# Patient Record
Sex: FEMALE | Race: WHITE | NOT HISPANIC OR LATINO | Employment: OTHER | URBAN - METROPOLITAN AREA
[De-identification: names, ages, dates, MRNs, and addresses within clinical notes are randomized per-mention and may not be internally consistent; named-entity substitution may affect disease eponyms.]

---

## 2017-01-12 ENCOUNTER — TELEPHONE (OUTPATIENT)
Dept: FAMILY MEDICINE | Facility: OTHER | Age: 57
End: 2017-01-12

## 2017-01-12 NOTE — TELEPHONE ENCOUNTER
Summary:    Patient is due/failing the following:   FIT, MAMMOGRAM and PAP    Action needed:   Patient needs office visit for PAP. and schedule a mammogram and complete a FIT test     Type of outreach:    Phone, left message for patient to call back.     Questions for provider review:    None                                                                                                                                    Jaquelin Wilruchi       Chart routed to Care Team .        Panel Management Review      Patient has the following on her problem list:     Depression / Dysthymia review  PHQ-9 SCORE 9/11/2015 12/11/2015 10/11/2016   Total Score - - -   Total Score MyChart - - -   Total Score 1 1 2      Patient is due for:  None    Hypertension   Last three blood pressure readings:  BP Readings from Last 3 Encounters:   10/11/16 124/80   02/05/16 147/85   12/11/15 130/84     Blood pressure: Passed    HTN Guidelines:  Age 18-59 BP range:  Less than 140/90  Age 60-85 with Diabetes:  Less than 140/90  Age 60-85 without Diabetes:  less than 150/90      Composite cancer screening  Chart review shows that this patient is due/due soon for the following Pap Smear, Mammogram and Fecal Colorectal (FIT)

## 2017-01-12 NOTE — Clinical Note
[facility]  [address]   [facility] [address]  Phone: [phone]  January 20, 2017      Micki Santos  [address] [place] [facility] [address]      Dear Micki,    We care about your health and have reviewed your health plan including your medical conditions, medications, and lab results.  Based on this review, it is recommended that you follow up regarding the following health topic(s):  -Breast Cancer Screening  -Colon Cancer Screening  -Cervical Cancer Screening    We recommend you take the following action(s):  -schedule a MAMMOGRAM which is due. Please disregard this reminder if you have had this exam elsewhere within the last 1-2 years please let us know so we can update your records.  -schedule a COLONOSCOPY to look for colon cancer (due every 10 years or 5 years in higher risk situations.)  Colonoscopies can prevent 90-95% of colon cancer deaths.  Problem lesions can be removed before they ever become cancer.  If you do not wish to do a colonoscopy or cannot afford to do one at this time, there is another option called a Fecal Immunochemical Occult Blood Test (FIT) a take home stool sample kit.  It does not replace the colonoscopy for colorectal cancer screening, but it can detect hidden bleeding in the lower colon.  It does need to be repeated every year and if a positive result is obtained, you would be referred for a colonoscopy.  If you have completed either one of these tests at another facility, please have the records sent to our clinic for our records.  -schedule a PAP SMEAR EXAM which is due.  Please disregard this reminder if you have had this exam elsewhere within the last year.  It would be helpful for us to have a copy of your recent pap smear report to update your records.     Please call us at the [facility] - [phone] (or use [employer]) to address the above recommendations.     Thank you for trusting [facility] and we  appreciate the opportunity to serve you.  We look forward to supporting your healthcare needs in the future.    Healthy Regards,    Your Health Care Team  [facility]

## 2017-01-17 NOTE — TELEPHONE ENCOUNTER
Attempted to call pt, unable to leave msg. Pt is also due to recheck TSH with lab only appt. Madelin Grimm, CMA

## 2017-03-02 ENCOUNTER — E-VISIT (OUTPATIENT)
Dept: FAMILY MEDICINE | Facility: OTHER | Age: 57
End: 2017-03-02
Payer: COMMERCIAL

## 2017-03-02 DIAGNOSIS — R05.9 COUGH: Primary | ICD-10-CM

## 2017-03-02 PROCEDURE — 99444 ZZC PHYSICIAN ONLINE EVALUATION & MANAGEMENT SERVICE: CPT | Performed by: FAMILY MEDICINE

## 2017-03-03 RX ORDER — AZITHROMYCIN 250 MG/1
TABLET, FILM COATED ORAL
Qty: 6 TABLET | Refills: 0 | Status: SHIPPED | OUTPATIENT
Start: 2017-03-03 | End: 2017-05-19

## 2017-03-23 DIAGNOSIS — E03.9 HYPOTHYROIDISM, UNSPECIFIED TYPE: ICD-10-CM

## 2017-03-23 DIAGNOSIS — F33.0 MAJOR DEPRESSIVE DISORDER, RECURRENT EPISODE, MILD (H): ICD-10-CM

## 2017-03-23 NOTE — TELEPHONE ENCOUNTER
Levothyroxine 150 mcg     Last Written Prescription Date: 10/11/2016  Last Quantity: 60, # refills: 0  Last Office Visit with [facility] or [facility] prescribing provider: 10/11/2016        TSH   Date Value Ref Range Status   10/11/2016 4.80 (H) 0.40 - 4.00 mU/L Final       Sertraline 100 mg     Last Written Prescription Date: 02/24/2016  Last Fill Quantity: 135, # refills: 1  Last Office Visit with [facility] primary care provider:  10/11/2016        Last PHQ-9 score on record=   PHQ-9 SCORE 10/11/2016   Total Score MyChart -   Total Score 2

## 2017-03-24 NOTE — TELEPHONE ENCOUNTER
Routing refill request to provider for review/approval because:  Labs out of range:  TSH  A break in medication  Aleisha Guerra RN

## 2017-03-25 RX ORDER — LEVOTHYROXINE SODIUM 150 UG/1
150 TABLET ORAL DAILY
Qty: 30 TABLET | Refills: 0 | Status: SHIPPED | OUTPATIENT
Start: 2017-03-25 | End: 2017-06-06

## 2017-03-25 RX ORDER — SERTRALINE HYDROCHLORIDE 100 MG/1
TABLET, FILM COATED ORAL
Qty: 45 TABLET | Refills: 0 | Status: SHIPPED | OUTPATIENT
Start: 2017-03-25 | End: 2017-06-06

## 2017-04-28 ENCOUNTER — E-VISIT (OUTPATIENT)
Dept: FAMILY MEDICINE | Facility: OTHER | Age: 57
End: 2017-04-28
Payer: COMMERCIAL

## 2017-04-28 DIAGNOSIS — I10 ESSENTIAL HYPERTENSION WITH GOAL BLOOD PRESSURE LESS THAN 140/90: ICD-10-CM

## 2017-04-28 DIAGNOSIS — J01.00 ACUTE MAXILLARY SINUSITIS, RECURRENCE NOT SPECIFIED: Primary | ICD-10-CM

## 2017-04-28 PROCEDURE — 99444 ZZC PHYSICIAN ONLINE EVALUATION & MANAGEMENT SERVICE: CPT | Performed by: FAMILY MEDICINE

## 2017-04-28 NOTE — TELEPHONE ENCOUNTER
atenolol      Last Written Prescription Date: 07/08/16  Last Fill Quantity: 90, # refills: 1    Last Office Visit with [facility], [facility] or [facility] prescribing provider:  E- visit today 4/28/17   Future Office Visit:        BP Readings from Last 3 Encounters:   10/11/16 124/80   02/05/16 147/85   12/11/15 130/84

## 2017-05-01 NOTE — TELEPHONE ENCOUNTER
Routing refill request to provider for review/approval because:  Appears to be a break in medication - should have been out in January    Neha Guardado, RN, BSN

## 2017-05-02 NOTE — TELEPHONE ENCOUNTER
Attempted to contact pt, unable to LM.   Please retry again later.   Mary Lou Quiñonez MA  May 2, 2017

## 2017-05-05 RX ORDER — ATENOLOL 50 MG/1
TABLET ORAL
Qty: 90 TABLET | Refills: 1 | Status: SHIPPED | OUTPATIENT
Start: 2017-05-05 | End: 2018-01-04

## 2017-05-12 NOTE — PROGRESS NOTES
SUBJECTIVE:                                                    Micki Santos is a 56 year old female who presents to clinic today for the following health issues:      History of Present Illness   Depression & Anxiety Follow-up:     Depression/Anxiety:  Depression only    Status since last visit::  Worsened    Other associated symptoms of depression::  None    Significant life event::  No    Current substance use::  None    Anxiety/Panic symptoms::  No      GERD/Heartburn     Onset: 3 weeks--worse with the increase in ibuprofen use, has been off proton pump inhibitor for 6 months    Description:     Burning in chest: YES    Intensity: moderate    Progression of Symptoms: worsening    Accompanying Signs & Symptoms:  Does it feel like food gets stuck: no  Nausea: YES  Vomiting (bloody?): no  Abdominal Pain: YES  Black-Tarry stools: no:  Bloody stools: no   History:   Previous ulcers: no    Precipitating factors:   Caffeine use: YES  Alcohol use: YES  NSAID/Aspirin use: YES  Tobacco use: no  Worse with no particular food or drink.    Alleviating factors:  Elevated head when sleeping         Therapies Tried and outcome:none    Joint Pain     Onset: 5 weeks, worse, more prolonged, more intense, more frequent.  Has some cramps, wakes her up in the middle of the night.  Hands and knees are bad.  Using ibuprofen more.  Saw Rheumatology 2 years ago.    Description:   Location: left knee and right knee, hands  Character: Dull ache, Burning and Cramping    Intensity: moderate    Progression of Symptoms: worse    Accompanying Signs & Symptoms:  Other symptoms: none   History:   Previous similar pain: YES      Precipitating factors:   Trauma or overuse: no     Alleviating factors:  Improved by: NSAID - ibuprofen       Therapies Tried and outcome: ibuprofen      Problem list and histories reviewed & adjusted, as indicated.  Additional history: as documented    Patient Active Problem List   Diagnosis     Esophageal reflux  "    Mild major depression (H)     Pain in thoracic spine     Hypertension, goal below 140/90     Family history of skin cancer     Multiple nevi     Hypothyroidism     Morbid obesity, unspecified obesity type (H)     Past Surgical History:   Procedure Laterality Date     C REMOVAL OF OVARY(S)      left ovary removed-cyst, uterine fibroid removed     CHOLECYSTECTOMY, LAPOROSCOPIC  06    Cholecystectomy, Laparoscopic     ESOPHAGOSCOPY, GASTROSCOPY, DUODENOSCOPY (EGD), COMBINED N/A 2016    Procedure: COMBINED ESOPHAGOSCOPY, GASTROSCOPY, DUODENOSCOPY (EGD), BIOPSY SINGLE OR MULTIPLE;  Surgeon: Del Betts MD;  Location:  GI     GENITOURINARY SURGERY      removal of left ovary with cyst and uterine fibroid     HC REMOVAL OF TONSILS,12+ Y/O      Tonsils 11+y.o.     HC UGI ENDOSCOPY, SIMPLE EXAM  09       Social History   Substance Use Topics     Smoking status: Never Smoker     Smokeless tobacco: Never Used      Comment: No smokers in home     Alcohol use Yes      Comment: 1-2 glasses of wine/week     Family History   Problem Relation Age of Onset     C.A.D. Maternal Grandmother      C.A.D. Maternal Grandfather      C.A.D. Paternal Grandfather       age 38     Respiratory Mother      sarcoidosis     Hypertension Mother      chronic     Depression Mother      10+ years     DIABETES Father      Type II diabetes     Coronary Artery Disease Father      Heart Attack with Angioplasty/Stent     Arthritis No family hx of            ROS:  C: NEGATIVE for fever, chills, change in weight  E/M: NEGATIVE for ear, mouth and throat problems  R: NEGATIVE for significant cough or SOB  CV: NEGATIVE for chest pain, palpitations or peripheral edema    OBJECTIVE:                                                    /82  Pulse 80  Temp 98.4  F (36.9  C) (Temporal)  Resp 16  Ht 5' 3.47\" (1.612 m)  Wt 240 lb (108.9 kg)  LMP 2003  BMI 41.89 kg/m2  Body mass index is 41.89 kg/(m^2).  Gen: no " "apparent distress  NECK: no adenopathy, no asymmetry, no masses  Chest: clear to auscultation without wheeze, rale or rhonchi  Cor: regular rate and rhythm without murmur  ABDOMEN: soft, nontender, no masses and bowel sounds normal  Ext: warm and dry without edema  MSK:  No joint swelling or redness, full range of motion of all joints  Psych: Alert and oriented times 3; coherent speech, normal   rate and volume, able to articulate logical thoughts, able   to abstract reason, no tangential thoughts, no hallucinations   or delusions  Her affect is neutral        ASSESSMENT/PLAN:                                                      BMI:   Estimated body mass index is 41.89 kg/(m^2) as calculated from the following:    Height as of this encounter: 5' 3.47\" (1.612 m).    Weight as of this encounter: 240 lb (108.9 kg).   Weight management plan: Discussed healthy diet and exercise guidelines and patient will follow up in 12 months in clinic to re-evaluate.      1. Multiple joint pain  Redraw labs, has been steroid responsive in the past, will try course of prednisone.  Consider returning to Rheumatology as they had consider starting Plaquenil for her.    - Lyme Disease Jennifer with reflex to WB Serum  - Cyclic Citrullinated Peptide Antibody IgG  - Rheumatoid factor  - Antinuclear antibody screen by EIA  - ESR: Erythrocyte sedimentation rate  - CRP inflammation  - predniSONE (DELTASONE) 5 MG tablet; Take 1 tab (5mg) twice daily for 3 weeks (total 10mg day), then 1 tab (5mg) daily for 2 week, then 1/2 tablet day for 2 week then stop.  Dispense: 71 tablet; Refill: 0    2. Gastroesophageal reflux disease without esophagitis  Restart proton pump inhibitor as chronic NSAIDs has exacerbated it.  States that Nexium caused her to feel ill.    - pantoprazole (PROTONIX) 40 MG EC tablet; Take 1 tablet (40 mg) by mouth daily  Dispense: 90 tablet; Refill: 3    3. Hypertension, goal below 140/90  Not well controlled, she feels it is better " controlled than my reading here, feels her pain has exacerbated her BP.  So hoping that prednisone use will improve her BP, she will get it checked again at a [place] pharmacy in 1-2 weeks.    4. Morbid obesity, unspecified obesity type (H)  Encouraged diet and exercise.    5. Major depressive disorder, recurrent episode, mild (H)  Feels mood is stable on Zoloft.    6. Screen for colon cancer    - Fecal colorectal cancer screen FIT - Future (S+30); Future    Miranda Bosch MD  [facility]

## 2017-05-16 ASSESSMENT — ANXIETY QUESTIONNAIRES
GAD7 TOTAL SCORE: 5
7. FEELING AFRAID AS IF SOMETHING AWFUL MIGHT HAPPEN: 0 = NOT AT ALL
GAD7 TOTAL SCORE: 5

## 2017-05-16 ASSESSMENT — PATIENT HEALTH QUESTIONNAIRE - PHQ9
SUM OF ALL RESPONSES TO PHQ QUESTIONS 1-9: 3
10. IF YOU CHECKED OFF ANY PROBLEMS, HOW DIFFICULT HAVE THESE PROBLEMS MADE IT FOR YOU TO DO YOUR WORK, TAKE CARE OF THINGS AT HOME, OR GET ALONG WITH OTHER PEOPLE: SOMEWHAT DIFFICULT

## 2017-05-17 ASSESSMENT — PATIENT HEALTH QUESTIONNAIRE - PHQ9: SUM OF ALL RESPONSES TO PHQ QUESTIONS 1-9: 3

## 2017-05-17 ASSESSMENT — ANXIETY QUESTIONNAIRES: GAD7 TOTAL SCORE: 5

## 2017-05-19 ENCOUNTER — OFFICE VISIT (OUTPATIENT)
Dept: FAMILY MEDICINE | Facility: OTHER | Age: 57
End: 2017-05-19
Payer: COMMERCIAL

## 2017-05-19 VITALS
RESPIRATION RATE: 16 BRPM | WEIGHT: 240 LBS | HEIGHT: 63 IN | DIASTOLIC BLOOD PRESSURE: 82 MMHG | HEART RATE: 80 BPM | SYSTOLIC BLOOD PRESSURE: 166 MMHG | BODY MASS INDEX: 42.52 KG/M2 | TEMPERATURE: 98.4 F

## 2017-05-19 DIAGNOSIS — K21.9 GASTROESOPHAGEAL REFLUX DISEASE WITHOUT ESOPHAGITIS: ICD-10-CM

## 2017-05-19 DIAGNOSIS — F33.0 MAJOR DEPRESSIVE DISORDER, RECURRENT EPISODE, MILD (H): ICD-10-CM

## 2017-05-19 DIAGNOSIS — I10 HYPERTENSION, GOAL BELOW 140/90: ICD-10-CM

## 2017-05-19 DIAGNOSIS — E66.01 MORBID OBESITY, UNSPECIFIED OBESITY TYPE (H): ICD-10-CM

## 2017-05-19 DIAGNOSIS — Z12.11 SCREEN FOR COLON CANCER: ICD-10-CM

## 2017-05-19 DIAGNOSIS — M25.50 MULTIPLE JOINT PAIN: Primary | ICD-10-CM

## 2017-05-19 LAB — ERYTHROCYTE [SEDIMENTATION RATE] IN BLOOD BY WESTERGREN METHOD: 9 MM/H (ref 0–30)

## 2017-05-19 PROCEDURE — 86140 C-REACTIVE PROTEIN: CPT | Performed by: FAMILY MEDICINE

## 2017-05-19 PROCEDURE — 99214 OFFICE O/P EST MOD 30 MIN: CPT | Performed by: FAMILY MEDICINE

## 2017-05-19 PROCEDURE — 86200 CCP ANTIBODY: CPT | Performed by: FAMILY MEDICINE

## 2017-05-19 PROCEDURE — 85652 RBC SED RATE AUTOMATED: CPT | Performed by: FAMILY MEDICINE

## 2017-05-19 PROCEDURE — 86038 ANTINUCLEAR ANTIBODIES: CPT | Performed by: FAMILY MEDICINE

## 2017-05-19 PROCEDURE — 86618 LYME DISEASE ANTIBODY: CPT | Performed by: FAMILY MEDICINE

## 2017-05-19 PROCEDURE — 86431 RHEUMATOID FACTOR QUANT: CPT | Performed by: FAMILY MEDICINE

## 2017-05-19 PROCEDURE — 36415 COLL VENOUS BLD VENIPUNCTURE: CPT | Performed by: FAMILY MEDICINE

## 2017-05-19 RX ORDER — PANTOPRAZOLE SODIUM 40 MG/1
40 TABLET, DELAYED RELEASE ORAL DAILY
Qty: 90 TABLET | Refills: 3 | Status: SHIPPED | OUTPATIENT
Start: 2017-05-19 | End: 2018-06-14

## 2017-05-19 RX ORDER — PREDNISONE 5 MG/1
TABLET ORAL
Qty: 71 TABLET | Refills: 0 | Status: SHIPPED | OUTPATIENT
Start: 2017-05-19 | End: 2018-05-14

## 2017-05-19 NOTE — MR AVS SNAPSHOT
After Visit Summary   5/19/2017    Micki Santos    MRN: [id number]           Patient Information     Date Of Birth          1960        Visit Information        Provider Department      5/19/2017 1:40 PM Miranda Bosch MD [facility]        Today's Diagnoses     Multiple joint pain    -  1    Gastroesophageal reflux disease without esophagitis        Screen for colon cancer           Follow-ups after your visit        Future tests that were ordered for you today     Open Future Orders        Priority Expected Expires Ordered    Fecal colorectal cancer screen FIT - Future (S+30) Routine 6/2/2017 6/11/2017 5/19/2017            Who to contact     If you have questions or need follow up information about today's clinic visit or your schedule please contact [facility] directly at [phone].  Normal or non-critical lab and imaging results will be communicated to you by MyChart, letter or phone within 4 business days after the clinic has received the results. If you do not hear from us within 7 days, please contact the clinic through MyChart or phone. If you have a critical or abnormal lab result, we will notify you by phone as soon as possible.  Submit refill requests through [employer] or call your pharmacy and they will forward the refill request to us. Please allow 3 business days for your refill to be completed.          Additional Information About Your Visit        MyChart Information     [employer] gives you secure access to your electronic health record. If you see a primary care provider, you can also send messages to your care team and make appointments. If you have questions, please call your primary care clinic.  If you do not have a primary care provider, please call [phone] and they will assist you.        Care EveryWhere ID     This is your Care EveryWhere ID. This could be used by other organizations to access your [facility]  "records  [id number]        Your Vitals Were     Pulse Temperature Respirations Height Last Period BMI (Body Mass Index)    80 98.4  F (36.9  C) (Temporal) 16 5' 3.47\" (1.612 m) 07/29/2003 41.89 kg/m2       Blood Pressure from Last 3 Encounters:   05/19/17 166/82   10/11/16 124/80   02/05/16 147/85    Weight from Last 3 Encounters:   05/19/17 240 lb (108.9 kg)   10/11/16 238 lb (108 kg)   02/05/16 245 lb (111.1 kg)              We Performed the Following     Antinuclear antibody screen by EIA     CRP inflammation     Cyclic Citrullinated Peptide Antibody IgG     DEPRESSION ACTION PLAN (DAP)     ESR: Erythrocyte sedimentation rate     Lyme Disease Jennifer with reflex to WB Serum     Rheumatoid factor          Today's Medication Changes          These changes are accurate as of: 5/19/17  2:33 PM.  If you have any questions, ask your nurse or doctor.               Start taking these medicines.        Dose/Directions    pantoprazole 40 MG EC tablet   Commonly known as:  PROTONIX   Used for:  Gastroesophageal reflux disease without esophagitis   Started by:  Miranda Bosch MD        Dose:  40 mg   Take 1 tablet (40 mg) by mouth daily   Quantity:  90 tablet   Refills:  3       predniSONE 5 MG tablet   Commonly known as:  DELTASONE   Used for:  Multiple joint pain   Started by:  Miranda Bosch MD        Take 1 tab (5mg) twice daily for 3 weeks (total 10mg day), then 1 tab (5mg) daily for 2 week, then 1/2 tablet day for 2 week then stop.   Quantity:  71 tablet   Refills:  0            Where to get your medicines      These medications were sent to [facility] [id number] [facility]  [address] [place] [address]     Phone:  [phone]     pantoprazole 40 MG EC tablet    predniSONE 5 MG tablet                Primary Care Provider Office Phone # Fax #    Miranda Bosch [phone]       [address]  [facility] [address]      "   Thank you!     Thank you for choosing [facility]  for your care. Our goal is always to provide you with excellent care. Hearing back from our patients is one way we can continue to improve our services. Please take a few minutes to complete the written survey that you may receive in the mail after your visit with us. Thank you!             Your Updated Medication List - Protect others around you: Learn how to safely use, store and throw away your medicines at www.disposemymeds.org.          This list is accurate as of: 5/19/17  2:33 PM.  Always use your most recent med list.                   Brand Name Dispense Instructions for use    atenolol 50 MG tablet    TENORMIN    90 tablet    TAKE ONE TABLET BY MOUTH ONE TIME DAILY       levothyroxine 150 MCG tablet    SYNTHROID/LEVOTHROID    30 tablet    Take 1 tablet (150 mcg) by mouth daily She is overdue for repeat lab       lisinopril 20 MG tablet    PRINIVIL/ZESTRIL    180 tablet    TAKE 1 TABLET (20 MG) BY MOUTH 2 TIMES DAILY       mometasone 50 MCG/ACT spray    NASONEX    1 Box    Spray 2 sprays into both nostrils daily       pantoprazole 40 MG EC tablet    PROTONIX    90 tablet    Take 1 tablet (40 mg) by mouth daily       predniSONE 5 MG tablet    DELTASONE    71 tablet    Take 1 tab (5mg) twice daily for 3 weeks (total 10mg day), then 1 tab (5mg) daily for 2 week, then 1/2 tablet day for 2 week then stop.       sertraline 100 MG tablet    ZOLOFT    45 tablet    TAKE 1 & 1/2 TABLETS (150 MG) BY MOUTH ONCE DAILY

## 2017-05-19 NOTE — LETTER
My Depression Action Plan  Name: Micki Santos   Date of Birth 1960  Date: 5/12/2017    My doctor: Miranda Bosch   My clinic: [address]  [facility] [address] [phone]          GREEN    ZONE   Good Control    What it looks like:     Things are going generally well. You have normal up s and down s. You may even feel depressed from time to time, but bad moods usually last less than a day.   What you need to do:  1. Continue to care for yourself (see self care plan)  2. Check your depression survival kit and update it as needed  3. Follow your physician s recommendations including any medication.  4. Do not stop taking medication unless you consult with your physician first.           YELLOW         ZONE Getting Worse    What it looks like:     Depression is starting to interfere with your life.     It may be hard to get out of bed; you may be starting to isolate yourself from others.    Symptoms of depression are starting to last most all day and this has happened for several days.     You may have suicidal thoughts but they are not constant.   What you need to do:     1. Call your care team, your response to treatment will improve if you keep your care team informed of your progress. Yellow periods are signs an adjustment may need to be made.     2. Continue your self-care, even if you have to fake it!    3. Talk to someone in your support network    4. Open up your depression survival kit           RED    ZONE Medical Alert - Get Help    What it looks like:     Depression is seriously interfering with your life.     You may experience these or other symptoms: You can t get out of bed most days, can t work or engage in other necessary activities, you have trouble taking care of basic hygiene, or basic responsibilities, thoughts of suicide or death that will not go away, self-injurious behavior.     What you need to do:  1. Call your  care team and request a same-day appointment. If they are not available (weekends or after hours) call your local crisis line, emergency room or 911.      Electronically signed by: Mirta Jordan, May 12, 2017    Depression Self Care Plan / Survival Kit    Self-Care for Depression  Here s the deal. Your body and mind are really not as separate as most people think.  What you do and think affects how you feel and how you feel influences what you do and think. This means if you do things that people who feel good do, it will help you feel better.  Sometimes this is all it takes.  There is also a place for medication and therapy depending on how severe your depression is, so be sure to consult with your medical provider and/ or Behavioral Health Consultant if your symptoms are worsening or not improving.     In order to better manage my stress, I will:    Exercise  Get some form of exercise, every day. This will help reduce pain and release endorphins, the  feel good  chemicals in your brain. This is almost as good as taking antidepressants!  This is not the same as joining a gym and then never going! (they count on that by the way ) It can be as simple as just going for a walk or doing some gardening, anything that will get you moving.      Hygiene   Maintain good hygiene (Get out of bed in the morning, Make your bed, Brush your teeth, Take a shower, and Get dressed like you were going to work, even if you are unemployed).  If your clothes don't fit try to get ones that do.    Diet  I will strive to eat foods that are good for me, drink plenty of water, and avoid excessive sugar, caffeine, alcohol, and other mood-altering substances.  Some foods that are helpful in depression are: complex carbohydrates, B vitamins, flaxseed, fish or fish oil, fresh fruits and vegetables.    Psychotherapy  I agree to participate in Individual Therapy (if recommended).    Medication  If prescribed medications, I agree to take them.   Missing doses can result in serious side effects.  I understand that drinking alcohol, or other illicit drug use, may cause potential side effects.  I will not stop my medication abruptly without first discussing it with my provider.    Staying Connected With Others  I will stay in touch with my friends, family members, and my primary care provider/team.    Use your imagination  Be creative.  We all have a creative side; it doesn t matter if it s oil painting, sand castles, or mud pies! This will also kick up the endorphins.    Witness Beauty  (AKA stop and smell the roses) Take a look outside, even in mid-winter. Notice colors, textures. Watch the squirrels and birds.     Service to others  Be of service to others.  There is always someone else in need.  By helping others we can  get out of ourselves  and remember the really important things.  This also provides opportunities for practicing all the other parts of the program.    Humor  Laugh and be silly!  Adjust your TV habits for less news and crime-drama and more comedy.    Control your stress  Try breathing deep, massage therapy, biofeedback, and meditation. Find time to relax each day.     My support system    Clinic Contact:  Phone number:    Contact 1:  Phone number:    Contact 2:  Phone number:    Congregational/:  Phone number:    Therapist:  Phone number:    Local crisis center:    Phone number:    Other community support:  Phone number:

## 2017-05-19 NOTE — NURSING NOTE
"Chief Complaint   Patient presents with     Gastrophageal Reflux     Joint Pain     Health Maintenance     height, mammogram, fit yearly, depression action plan, phq9       Initial /82  Pulse 80  Temp 98.4  F (36.9  C) (Temporal)  Resp 16  Ht 5' 3.47\" (1.612 m)  Wt 240 lb (108.9 kg)  LMP 07/29/2003  BMI 41.89 kg/m2 Estimated body mass index is 41.89 kg/(m^2) as calculated from the following:    Height as of this encounter: 5' 3.47\" (1.612 m).    Weight as of this encounter: 240 lb (108.9 kg).  Medication Reconciliation: complete   Madelin Grimm CMA    "

## 2017-05-20 LAB — CRP SERPL-MCNC: 4.6 MG/L (ref 0–8)

## 2017-05-21 LAB
B BURGDOR IGG+IGM SER QL: 0.08 (ref 0–0.89)
RHEUMATOID FACT SER NEPH-ACNC: <20 IU/ML (ref 0–20)

## 2017-05-22 LAB
ANA SER QL IA: NORMAL
CCP AB SER IA-ACNC: 1 U/ML

## 2017-05-22 NOTE — PROGRESS NOTES
Dima Sweet,     My name is Rhonda Rousseau I am a family Nurse Practitioner helping to cover for Dr. Cavanaugh as she is out of the office. I wanted to let you know that your lab work from the office visit is good none were positive for inflammation and the Lyme was negative as well. I noticed you discussed re-visiting Rheumatology if you would like a referral for this please let me know and I will place referral in for you.    Thank you  Rhonda Rousseau CNP

## 2017-06-06 DIAGNOSIS — E03.9 HYPOTHYROIDISM, UNSPECIFIED TYPE: ICD-10-CM

## 2017-06-06 DIAGNOSIS — F33.0 MAJOR DEPRESSIVE DISORDER, RECURRENT EPISODE, MILD (H): ICD-10-CM

## 2017-06-07 NOTE — TELEPHONE ENCOUNTER
Levothyroxine     Last Written Prescription Date: 3/25/2017  Last Quantity: 30, # refills: 0  Last Office Visit with [facility], [facility] or [facility] prescribing provider: 5/19/201        TSH   Date Value Ref Range Status   10/11/2016 4.80 (H) 0.40 - 4.00 mU/L Final     Zoloft     Last Written Prescription Date: 3/25/2017  Last Fill Quantity: 45, # refills: 0  Last Office Visit with [facility] prim/ramona care provider:  5/19/2017        Last PHQ-9 score on record=   PHQ-9 SCORE 5/16/2017   Total Score Seanhart 3 (Minimal depression)   Total Score -         Raquel Vargas MA

## 2017-06-08 RX ORDER — SERTRALINE HYDROCHLORIDE 100 MG/1
TABLET, FILM COATED ORAL
Qty: 45 TABLET | Refills: 4 | Status: SHIPPED | OUTPATIENT
Start: 2017-06-08 | End: 2018-01-04

## 2017-06-08 RX ORDER — LEVOTHYROXINE SODIUM 150 UG/1
TABLET ORAL
Qty: 30 TABLET | Refills: 0 | Status: SHIPPED | OUTPATIENT
Start: 2017-06-08 | End: 2018-05-14

## 2017-06-08 NOTE — TELEPHONE ENCOUNTER
Zoloft:  Prescription approved per [facility] Refill Protocol.  Synthroid:  Routing refill request to provider for review/approval because:  Labs out of range:  TSH    Neha Guardado, RN, BSN

## 2017-06-13 ENCOUNTER — OFFICE VISIT (OUTPATIENT)
Dept: OTHER | Facility: CLINIC | Age: 57
End: 2017-06-13
Attending: SURGERY
Payer: COMMERCIAL

## 2017-06-13 ENCOUNTER — HOSPITAL ENCOUNTER (OUTPATIENT)
Dept: ULTRASOUND IMAGING | Facility: CLINIC | Age: 57
Discharge: HOME | End: 2017-06-13
Attending: SURGERY | Admitting: SURGERY
Payer: COMMERCIAL

## 2017-06-13 VITALS — SYSTOLIC BLOOD PRESSURE: 136 MMHG | HEART RATE: 47 BPM | DIASTOLIC BLOOD PRESSURE: 79 MMHG

## 2017-06-13 DIAGNOSIS — I73.9 CLAUDICATION (H): ICD-10-CM

## 2017-06-13 DIAGNOSIS — M79.604 PAIN IN BOTH LOWER EXTREMITIES: Primary | ICD-10-CM

## 2017-06-13 DIAGNOSIS — M79.605 PAIN IN BOTH LOWER EXTREMITIES: Primary | ICD-10-CM

## 2017-06-13 PROCEDURE — 93924 LWR XTR VASC STDY BILAT: CPT

## 2017-06-13 PROCEDURE — 99211 OFF/OP EST MAY X REQ PHY/QHP: CPT

## 2017-06-13 PROCEDURE — 99204 OFFICE O/P NEW MOD 45 MIN: CPT | Mod: ZP | Performed by: SURGERY

## 2017-06-13 NOTE — NURSING NOTE
"Chief Complaint   Patient presents with     RECHECK     GOGO w/ exercise (10:15 [facility]; 10:45 [facility]) New consult, patient self referred for claudication symptoms, records in Epic       Initial /79 (BP Location: Left arm, Patient Position: Chair, Cuff Size: Adult Large)  Pulse (!) 47  LMP 07/29/2003  Breastfeeding? No Estimated body mass index is 41.89 kg/(m^2) as calculated from the following:    Height as of 5/19/17: 5' 3.47\" (1.612 m).    Weight as of 5/19/17: 240 lb (108.9 kg).  Medication Reconciliation: complete     Face to face nursing time: 8 minutes    Kimmie Dotson MA     "

## 2017-06-13 NOTE — MR AVS SNAPSHOT
After Visit Summary   6/13/2017    Micki Santos    MRN: [id number]           Patient Information     Date Of Birth          1960        Visit Information        Provider Department      6/13/2017 10:45 AM Damien Merritt MD [facility]      Today's Diagnoses     Pain in both lower extremities    -  1       Follow-ups after your visit        Who to contact     If you have questions or need follow up information about today's clinic visit or your schedule please contact [facility] [place] directly at [phone].  Normal or non-critical lab and imaging results will be communicated to you by MyChart, letter or phone within 4 business days after the clinic has received the results. If you do not hear from us within 7 days, please contact the clinic through CEPA Safe Drivehart or phone. If you have a critical or abnormal lab result, we will notify you by phone as soon as possible.  Submit refill requests through WineNice or call your pharmacy and they will forward the refill request to us. Please allow 3 business days for your refill to be completed.          Additional Information About Your Visit        MyChart Information     WineNice gives you secure access to your electronic health record. If you see a primary care provider, you can also send messages to your care team and make appointments. If you have questions, please call your primary care clinic.  If you do not have a primary care provider, please call [phone] and they will assist you.        Care EveryWhere ID     This is your Care EveryWhere ID. This could be used by other organizations to access your [place] medical records  [id number]        Your Vitals Were     Pulse Last Period Breastfeeding?             47 07/29/2003 No          Blood Pressure from Last 3 Encounters:   06/13/17 136/79   05/19/17 166/82   10/11/16 124/80    Weight from Last 3  Encounters:   05/19/17 240 lb (108.9 kg)   10/11/16 238 lb (108 kg)   02/05/16 245 lb (111.1 kg)              Today, you had the following     No orders found for display       Primary Care Provider Office Phone # Fax #    Miranda WHITE MD Dan[place] [phone]       [facility] [address] [facility] [address]  [place] [address]        Thank you!     Thank you for choosing [facility] [place]  for your care. Our goal is always to provide you with excellent care. Hearing back from our patients is one way we can continue to improve our services. Please take a few minutes to complete the written survey that you may receive in the mail after your visit with us. Thank you!             Your Updated Medication List - Protect others around you: Learn how to safely use, store and throw away your medicines at www.disposemymeds.org.          This list is accurate as of: 6/13/17 12:48 PM.  Always use your most recent med list.                   Brand Name Dispense Instructions for use    atenolol 50 MG tablet    TENORMIN    90 tablet    TAKE ONE TABLET BY MOUTH ONE TIME DAILY       levothyroxine 150 MCG tablet    SYNTHROID/LEVOTHROID    30 tablet    TAKE ONE TABLET BY MOUTH ONE TIME DAILY       lisinopril 20 MG tablet    PRINIVIL/ZESTRIL    180 tablet    TAKE 1 TABLET (20 MG) BY MOUTH 2 TIMES DAILY       mometasone 50 MCG/ACT spray    NASONEX    1 Box    Spray 2 sprays into both nostrils daily       pantoprazole 40 MG EC tablet    PROTONIX    90 tablet    Take 1 tablet (40 mg) by mouth daily       predniSONE 5 MG tablet    DELTASONE    71 tablet    Take 1 tab (5mg) twice daily for 3 weeks (total 10mg day), then 1 tab (5mg) daily for 2 week, then 1/2 tablet day for 2 week then stop.       sertraline 100 MG tablet    ZOLOFT    45 tablet    TAKE 1 & 1/2 TABLETS BY MOUTH ONCE DAILY

## 2017-06-13 NOTE — LETTER
[facility] [place] [address]  [address]  [place] [address]  Phone: [phone]  Fax: [phone]        2017    [facility]     RE:  Micki Santos-:  58      PRESENTING COMPLAINT:  Lower extremity pain.       HISTORY OF PRESENTING ILLNESS:  Mrs. Micki Santos is a 56-year-old female who is here for evaluation of pain behind both knees when sleeping.  Walking actually makes it feel better.  This has been going on for the last 6-9 months, but has been worse over the last 6 weeks.  Right is worse than the left.  The pain is located in the posterior thigh and calf.  It is hard for her to sit as the pain starts resurfacing when she sits down or lies down.  It wakes her up multiple times during the week.       PAST MEDICAL HISTORY:   1.  Morbid obesity.   2.  Depression.   3.  Hypothyroidism.   4.  Hypertension.       PAST SURGICAL HISTORY:   1.  Laparoscopic cholecystectomy.   2.  Left oophorectomy.   3.  Tonsillectomy.       SOCIAL HISTORY:  She does not smoke.  She occasionally consumes alcohol.  She works at [employer] and her job requires a lot of traveling.  She recently has been going at least once a month to [place] [place].       FAMILY HISTORY:  Her father had heart disease and had a myocardial infarction.  He also required percutaneous coronary intervention.  He also suffered from congestive heart failure.  Her mother had chronic kidney disease and congestive heart failure.       REVIEW OF SYSTEMS:  As noted in History of Presenting Illness, otherwise negative.       PHYSICAL EXAMINATION:   GENERAL:  She appears comfortable.  She is in no acute distress.   VITAL SIGNS:  Reviewed.   HEENT:  Head is atraumatic and normocephalic.  Mucosa are pink.   EYES:  Extraocular motions are intact.  Sclerae are anicteric.   MENTAL:  Alert and oriented.  Judgment and insight are good.   LYMPHATIC:  No supraclavicular or cervical adenopathy noted.    CARDIOVASCULAR:  Regular rate and rhythm.  S1 plus S2+0.   RESPIRATORY:  Good air entry bilaterally.  Good respiratory effort, no accessory muscles are being used.   ABDOMEN:  Soft, nontender, no hepatosplenomegaly is noted.   EXTREMITIES:  No clubbing, cyanosis or edema.   VASCULAR:  No carotid bruits.  3+ bilateral radial pulses.  Both dorsalis pedis and posterior tibial pulses are 2+.  In passive dorsiflexion and active plantar flexion, there is no change in the quality of pulses in the dorsalis pedis or posterior tibial artery distribution.       NONINVASIVE STUDIES:  She underwent ankle-brachial indices without and then with exercise.  On the right side, the resting ankle-brachial index is 1.16 and 1.09.  After exercise it is 1.19 on the right.  On the left side, the resting ankle brachial index is 1.07 and 1.12, and it goes to 1.08 after exercise.       DIAGNOSIS:  Bilateral lower extremity pain.       PLAN:  I do not believe her symptoms are due to any significant peripheral arterial disease nor does she show any signs of popliteal artery entrapment.  She does have tenderness in both posterior thighs in the hamstring area.  I have advised her to seek consultation with one of our general orthopedic colleagues.  From my specialty, I do not have anything else to offer.  All of this was explained to her and all of her questions were answered.           JASON TORRES MD                D: 06/13/2017 12:43   T: 06/18/2017 13:02   MT: jessica

## 2017-06-18 NOTE — PROGRESS NOTES
[facility]      PRESENTING COMPLAINT:  Lower extremity pain.      HISTORY OF PRESENTING ILLNESS:  Mrs. Micki Santos is a 56-year-old female who is here for evaluation of pain behind both knees when sleeping.  Walking actually makes it feel better.  This has been going on for the last 6-9 months, but has been worse over the last 6 weeks.  Right is worse than the left.  The pain is located in the posterior thigh and calf.  It is hard for her to sit as the pain starts resurfacing when she sits down or lies down.  It wakes her up multiple times during the week.      PAST MEDICAL HISTORY:   1.  Morbid obesity.   2.  Depression.   3.  Hypothyroidism.   4.  Hypertension.      PAST SURGICAL HISTORY:   1.  Laparoscopic cholecystectomy.   2.  Left oophorectomy.   3.  Tonsillectomy.      SOCIAL HISTORY:  She does not smoke.  She occasionally consumes alcohol.  She works at [employer] and her job requires a lot of traveling.  She recently has been going at least once a month to [place].      FAMILY HISTORY:  Her father had heart disease and had a myocardial infarction.  He also required percutaneous coronary intervention.  He also suffered from congestive heart failure.  Her mother had chronic kidney disease and congestive heart failure.      REVIEW OF SYSTEMS:  As noted in History of Presenting Illness, otherwise negative.      PHYSICAL EXAMINATION:   GENERAL:  She appears comfortable.  She is in no acute distress.   VITAL SIGNS:  Reviewed.   HEENT:  Head is atraumatic and normocephalic.  Mucosa are pink.   EYES:  Extraocular motions are intact.  Sclerae are anicteric.   MENTAL:  Alert and oriented.  Judgment and insight are good.   LYMPHATIC:  No supraclavicular or cervical adenopathy noted.   CARDIOVASCULAR:  Regular rate and rhythm.  S1 plus S2+0.   RESPIRATORY:  Good air entry bilaterally.  Good respiratory effort, no accessory muscles are being used.   ABDOMEN:  Soft, nontender, no hepatosplenomegaly is  noted.   EXTREMITIES:  No clubbing, cyanosis or edema.   VASCULAR:  No carotid bruits.  3+ bilateral radial pulses.  Both dorsalis pedis and posterior tibial pulses are 2+.  In passive dorsiflexion and active plantar flexion, there is no change in the quality of pulses in the dorsalis pedis or posterior tibial artery distribution.      NONINVASIVE STUDIES:  She underwent ankle-brachial indices without and then with exercise.  On the right side, the resting ankle-brachial index is 1.16 and 1.09.  After exercise it is 1.19 on the right.  On the left side, the resting ankle brachial index is 1.07 and 1.12, and it goes to 1.08 after exercise.      DIAGNOSIS:  Bilateral lower extremity pain.      PLAN:  I do not believe her symptoms are due to any significant peripheral arterial disease nor does she show any signs of popliteal artery entrapment.  She does have tenderness in both posterior thighs in the hamstring area.  I have advised her to seek consultation with one of our general orthopedic colleagues.  From my specialty, I do not have anything else to offer.  All of this was explained to her and all of her questions were answered.         JASON TORRES MD             D: 2017 12:43   T: 2017 13:02   MT: [email or web address]      Name:     SILAS SANCHEZ   MRN:      [id number]        Account:      [id number]   :      1960           Visit Date:   2017      Document: [id number]

## 2017-06-22 NOTE — PROGRESS NOTES
SUBJECTIVE:                                                    Micki Santos is a 56 year old female who presents to clinic today for the following health issues:    HPI    Patient in today for swollen lymph nodes on the right side of the neck under the jaw. It has been going on for a week and has been worse over the past few days. States the area is painful along with right ear pain and pain with swallowing. She denies any cough, fatigue, runny nose, fevers or chills. She states she has had her MMR vaccine.     Problem list and histories reviewed & adjusted, as indicated.  Additional history: none    ROS:  GENERAL: Denies fever, fatigue, weakness, weight gain, or weight loss.  HEENT: Eyes-Denies pain, redness, loss of vision, double or blurred vision.     Ears/Nose- +Right sided swollen neck lymph nodes. Denies tinnitus, loss of hearing, epistaxis, decreased sense of smell, nasal congestion. Denies loss of sense of taste, dry mouth, or sore throat.   CARDIOVASCULAR: Denies chest pain, shortness of breath, irregular heartbeats,  palpitations, or edema.  RESPIRATORY: Denies cough, hemoptysis, and shortness of breath.    OBJECTIVE:     /80 (BP Location: Right arm, Patient Position: Chair, Cuff Size: Adult Regular)  Pulse 55  Temp 97.3  F (36.3  C) (Temporal)  Resp 20  Wt 243 lb (110.2 kg)  LMP 07/29/2003  SpO2 97%  BMI 42.42 kg/m2  Body mass index is 42.42 kg/(m^2).  GENERAL: healthy, alert and no distress  EYES: Eyes grossly normal to inspection, PERRL and conjunctivae and sclerae normal  HENT: ear canals and TM's normal, nose and mouth without ulcers or lesions  NECK: Mildly prominent and tender right submandibular and submental lymph nodes. No parotid swelling.   RESP: lungs clear to auscultation - no rales, rhonchi or wheezes  CV: regular rate and rhythm, normal S1 S2, no S3 or S4, no murmur, click or rub, no peripheral edema and peripheral pulses strong    ASSESSMENT/PLAN:       ICD-10-CM     1. Lymphadenitis, acute L04.9 amoxicillin (AMOXIL) 875 MG tablet       Symptoms are consistent with lymphadenitis. Mumps is very unlikely as parotid swelling is not present. She is currently on prednisone for her multiple joint pain and has been on it for the past month which suppresses her immune system so will treat empirically with amoxicillin for 10 days.  Instructed to take a probiotic while on this.   Take Tylenol or ibuprofen as needed for pain.  Use a cool compress over the area.  If the pain worsens or she develop a high fever or inability to swallow, she needs to be seen again.     Yony Santos PA-C  [facility]

## 2017-06-23 ENCOUNTER — OFFICE VISIT (OUTPATIENT)
Dept: FAMILY MEDICINE | Facility: OTHER | Age: 57
End: 2017-06-23
Payer: COMMERCIAL

## 2017-06-23 VITALS
WEIGHT: 243 LBS | TEMPERATURE: 97.3 F | OXYGEN SATURATION: 97 % | BODY MASS INDEX: 42.42 KG/M2 | HEART RATE: 55 BPM | DIASTOLIC BLOOD PRESSURE: 80 MMHG | SYSTOLIC BLOOD PRESSURE: 124 MMHG | RESPIRATION RATE: 20 BRPM

## 2017-06-23 DIAGNOSIS — L04.9 LYMPHADENITIS, ACUTE: Primary | ICD-10-CM

## 2017-06-23 PROCEDURE — 99213 OFFICE O/P EST LOW 20 MIN: CPT | Performed by: PHYSICIAN ASSISTANT

## 2017-06-23 RX ORDER — AMOXICILLIN 875 MG
875 TABLET ORAL 2 TIMES DAILY
Qty: 20 TABLET | Refills: 0 | Status: SHIPPED | OUTPATIENT
Start: 2017-06-23 | End: 2018-05-14

## 2017-06-23 NOTE — MR AVS SNAPSHOT
After Visit Summary   6/23/2017    Micik Santos    MRN: [id number]           Patient Information     Date Of Birth          1960        Visit Information        Provider Department      6/23/2017 4:15 PM Yony Santos PA-C [facility]        Today's Diagnoses     Lymphadenitis, acute    -  1      Care Instructions    Your symptoms are consistent with lymphadenitis which is swelling of a lymph node.  Will place you on a 10 day course of amoxicillin since you are on prednisone which can suppress your immune system.   Take a probiotic while on this.   Take Tylenol or ibuprofen as needed for pain.  Use a cool compress over the area.  If the pain worsens or you develop a high fever or inability to swallow, you need to be seen again.           Follow-ups after your visit        Who to contact     If you have questions or need follow up information about today's clinic visit or your schedule please contact [facility] directly at [phone].  Normal or non-critical lab and imaging results will be communicated to you by AnaBioshart, letter or phone within 4 business days after the clinic has received the results. If you do not hear from us within 7 days, please contact the clinic through [email or web address] or phone. If you have a critical or abnormal lab result, we will notify you by phone as soon as possible.  Submit refill requests through Heyo or call your pharmacy and they will forward the refill request to us. Please allow 3 business days for your refill to be completed.          Additional Information About Your Visit        AnaBioshart Information     Heyo gives you secure access to your electronic health record. If you see a primary care provider, you can also send messages to your care team and make appointments. If you have questions, please call your primary care clinic.  If you do not have a primary care provider, please call [phone] and they will assist  you.        Care EveryWhere ID     This is your Care EveryWhere ID. This could be used by other organizations to access your [place] medical records  [id number]        Your Vitals Were     Pulse Temperature Respirations Last Period Pulse Oximetry BMI (Body Mass Index)    55 97.3  F (36.3  C) (Temporal) 20 07/29/2003 97% 42.42 kg/m2       Blood Pressure from Last 3 Encounters:   06/23/17 124/80   06/13/17 136/79   05/19/17 166/82    Weight from Last 3 Encounters:   06/23/17 243 lb (110.2 kg)   05/19/17 240 lb (108.9 kg)   10/11/16 238 lb (108 kg)              Today, you had the following     No orders found for display         Today's Medication Changes          These changes are accurate as of: 6/23/17  4:32 PM.  If you have any questions, ask your nurse or doctor.               Start taking these medicines.        Dose/Directions    amoxicillin 875 MG tablet   Commonly known as:  AMOXIL   Used for:  Lymphadenitis, acute   Started by:  Yony Santos PA-C        Dose:  875 mg   Take 1 tablet (875 mg) by mouth 2 times daily   Quantity:  20 tablet   Refills:  0            Where to get your medicines      These medications were sent to [facility] [address] [place], [place] [address]     Phone:  [phone]     amoxicillin 875 MG tablet                Primary Care Provider Office Phone # Fax #    Miranda WHITE MD [place] [phone]       [address]  [facility] [address]        Equal Access to Services     [facility] HospitalCHRISTOPHE AH: Hadii bridger villedao Somega, waaxda luqadaha, qaybta kaalmada virgilio, waxbelkis idiin hayaan adeeg kharash la'aan . So [facility] [phone].    ATENCIÓN: Si habla español, tiene a dumont disposición servicios gratuitos de asistencia lingüística. Llame al [phone].    We comply with applicable federal civil rights laws and [place] laws. We do not discriminate on the basis of race, color, national  origin, age, disability sex, sexual orientation or gender identity.            Thank you!     Thank you for choosing [facility]  for your care. Our goal is always to provide you with excellent care. Hearing back from our patients is one way we can continue to improve our services. Please take a few minutes to complete the written survey that you may receive in the mail after your visit with us. Thank you!             Your Updated Medication List - Protect others around you: Learn how to safely use, store and throw away your medicines at www.disposemymeds.org.          This list is accurate as of: 6/23/17  4:32 PM.  Always use your most recent med list.                   Brand Name Dispense Instructions for use Diagnosis    amoxicillin 875 MG tablet    AMOXIL    20 tablet    Take 1 tablet (875 mg) by mouth 2 times daily    Lymphadenitis, acute       atenolol 50 MG tablet    TENORMIN    90 tablet    TAKE ONE TABLET BY MOUTH ONE TIME DAILY    Essential hypertension with goal blood pressure less than 140/90       levothyroxine 150 MCG tablet    SYNTHROID/LEVOTHROID    30 tablet    TAKE ONE TABLET BY MOUTH ONE TIME DAILY    Hypothyroidism, unspecified type       lisinopril 20 MG tablet    PRINIVIL/ZESTRIL    180 tablet    TAKE 1 TABLET (20 MG) BY MOUTH 2 TIMES DAILY    Essential hypertension with goal blood pressure less than 140/90       mometasone 50 MCG/ACT spray    NASONEX    1 Box    Spray 2 sprays into both nostrils daily    Chronic rhinitis       pantoprazole 40 MG EC tablet    PROTONIX    90 tablet    Take 1 tablet (40 mg) by mouth daily    Gastroesophageal reflux disease without esophagitis       predniSONE 5 MG tablet    DELTASONE    71 tablet    Take 1 tab (5mg) twice daily for 3 weeks (total 10mg day), then 1 tab (5mg) daily for 2 week, then 1/2 tablet day for 2 week then stop.    Multiple joint pain       sertraline 100 MG tablet    ZOLOFT    45 tablet    TAKE 1 & 1/2 TABLETS BY MOUTH ONCE  DAILY    Major depressive disorder, recurrent episode, mild (H)

## 2017-06-23 NOTE — NURSING NOTE
"Chief Complaint   Patient presents with     Pharyngitis     Panel Management     [email or web address],        Initial /80 (BP Location: Right arm, Patient Position: Chair, Cuff Size: Adult Regular)  Pulse 55  Temp 97.3  F (36.3  C) (Temporal)  Resp 20  Wt 243 lb (110.2 kg)  LMP 07/29/2003  SpO2 97%  BMI 42.42 kg/m2 Estimated body mass index is 42.42 kg/(m^2) as calculated from the following:    Height as of 5/19/17: 5' 3.47\" (1.612 m).    Weight as of this encounter: 243 lb (110.2 kg).  Medication Reconciliation: complete     Farzana Mathis, ABAD      "

## 2017-06-23 NOTE — PATIENT INSTRUCTIONS
Your symptoms are consistent with lymphadenitis which is swelling of a lymph node.  Will place you on a 10 day course of amoxicillin since you are on prednisone which can suppress your immune system.   Take a probiotic while on this.   Take Tylenol or ibuprofen as needed for pain.  Use a cool compress over the area.  If the pain worsens or you develop a high fever or inability to swallow, you need to be seen again.

## 2017-07-21 DIAGNOSIS — E03.9 HYPOTHYROIDISM, UNSPECIFIED TYPE: ICD-10-CM

## 2017-07-21 NOTE — TELEPHONE ENCOUNTER
levothyroxine (SYNTHROID/LEVOTHROID) 150 MCG tablet     Last Written Prescription Date: 6/8/17  Last Quantity: 30, # refills: 0  Last Office Visit with [facility], [facility] or [facility] prescribing provider: 6/23/17        TSH   Date Value Ref Range Status   10/11/2016 4.80 (H) 0.40 - 4.00 mU/L Final

## 2017-07-24 RX ORDER — LEVOTHYROXINE SODIUM 150 UG/1
150 TABLET ORAL DAILY
Qty: 30 TABLET | Refills: 0 | Status: SHIPPED | OUTPATIENT
Start: 2017-07-24 | End: 2017-09-01

## 2017-07-24 NOTE — TELEPHONE ENCOUNTER
Routing refill request to provider for review/approval because:  Marlene given x1 and patient did not follow up  Labs out of range:  TSH    Neha Guardado, RN, BSN

## 2017-09-01 DIAGNOSIS — E03.9 HYPOTHYROIDISM, UNSPECIFIED TYPE: ICD-10-CM

## 2017-09-01 RX ORDER — LEVOTHYROXINE SODIUM 150 UG/1
TABLET ORAL
Qty: 30 TABLET | Refills: 0 | Status: SHIPPED | OUTPATIENT
Start: 2017-09-01 | End: 2017-10-23

## 2017-09-01 NOTE — TELEPHONE ENCOUNTER
levothyroxine (SYNTHROID/LEVOTHROID) 150 MCG tablet     Last Written Prescription Date: 7/24/17  Last Quantity: 30, # refills: 0  Last Office Visit with [facility], [facility] or [facility] prescribing provider: 6/23/17        TSH   Date Value Ref Range Status   10/11/2016 4.80 (H) 0.40 - 4.00 mU/L Final

## 2017-09-06 ENCOUNTER — TELEPHONE (OUTPATIENT)
Dept: FAMILY MEDICINE | Facility: OTHER | Age: 57
End: 2017-09-06

## 2017-09-06 NOTE — LETTER
[facility]  [address]  [place] [facility] [address]  [phone]      September 13, 2017      Micki Santos  [address] [facility] [address]        Dear Micki,    We care about your health and have reviewed your health plan including your medical conditions, medications, and lab results.  Based on this review, it is recommended that you follow up regarding the following health topic(s):  -Mammogram & Labs    We recommend you take the following action(s):  -schedule a LAB ONLY APPOINTMENT to recheck your: TSH (thyroid test) and  a FIT test from the lab.  within the next 1-4 weeks.  If' you have had labs completed eslewhere, please let us know so we can update your records.    -schedule a MAMMOGRAM which is due. Please disregard this reminder if you have had this exam elsewhere within the last 1-2 years please let us know so we can update your records.     Please call us at the [facility] - [phone] (or use [employer]) to address the above recommendations.     Thank you for trusting [facility] and we appreciate the opportunity to serve you.  We look forward to supporting your healthcare needs in the future.    Healthy Regards,    Your Health Care Team  [facility]

## 2017-09-06 NOTE — TELEPHONE ENCOUNTER
Patient is due/failing the following:   TSH, FIT and MAMMOGRAM    Action needed:   Patient needs non-fasting lab only appointment and Schedule Mammogram and  FIT testing    Type of outreach:    Phone, left message for patient to call back.     Questions for provider review:    None            Panel Management Review      Patient has the following on her problem list:     Depression / Dysthymia review  PHQ-9 SCORE 12/11/2015 10/11/2016 5/16/2017   Total Score - - -   Total Score MyChart - - 3 (Minimal depression)   Total Score 1 2 -      Patient is due for:  None    Hypertension   Last three blood pressure readings:  BP Readings from Last 3 Encounters:   06/23/17 124/80   06/13/17 136/79   05/19/17 166/82     Blood pressure: FAILED    HTN Guidelines:  Age 18-59 BP range:  Less than 140/90  Age 60-85 with Diabetes:  Less than 140/90  Age 60-85 without Diabetes:  less than 150/90          Composite cancer screening  Chart review shows that this patient is due/due soon for the following Mammogram and Fecal Colorectal (FIT)  Summary:    Patient is due/failing the following:   TSH, FIT and MAMMOGRAM    Action needed:   Patient needs non-fasting lab only appointment and Schedule Mammogram and  FIT testing    Type of outreach:    Phone, left message for patient to call back.     Questions for provider review:    None                                                                                                                                    Annette Root CMA     Chart routed to Care Team .

## 2017-09-13 ENCOUNTER — TELEPHONE (OUTPATIENT)
Dept: FAMILY MEDICINE | Facility: OTHER | Age: 57
End: 2017-09-13

## 2017-09-13 DIAGNOSIS — I10 ESSENTIAL HYPERTENSION WITH GOAL BLOOD PRESSURE LESS THAN 140/90: ICD-10-CM

## 2017-09-13 NOTE — TELEPHONE ENCOUNTER
lisinopril      Last Written Prescription Date: 07/08/16  Last Fill Quantity: 180, # refills: 1  Last Office Visit with G, P or [facility] prescribing provider: 06/23/17       Potassium   Date Value Ref Range Status   10/11/2016 4.8 3.4 - 5.3 mmol/L Final     Creatinine   Date Value Ref Range Status   10/11/2016 0.88 0.52 - 1.04 mg/dL Final     BP Readings from Last 3 Encounters:   06/23/17 124/80   06/13/17 136/79   05/19/17 166/82

## 2017-09-18 RX ORDER — LISINOPRIL 20 MG/1
TABLET ORAL
Qty: 60 TABLET | Refills: 1 | Status: SHIPPED | OUTPATIENT
Start: 2017-09-18 | End: 2018-01-04

## 2017-09-18 NOTE — TELEPHONE ENCOUNTER
Medication is being filled for 1 time refill only due to:  Patient needs labs potassium, creatinine. BMP due in OCT, FIT and mammo also, pap due on NOV.    Please schedule OV in NOV    Please call and help schedule.    Florin Page, RN, BSN

## 2017-09-18 NOTE — TELEPHONE ENCOUNTER
Left message for patient to call back. Please see message below and assist in scheduling.  Farzana Mathis, CMA

## 2017-10-23 ENCOUNTER — TELEPHONE (OUTPATIENT)
Dept: FAMILY MEDICINE | Facility: OTHER | Age: 57
End: 2017-10-23

## 2017-10-23 DIAGNOSIS — E03.9 HYPOTHYROIDISM, UNSPECIFIED TYPE: ICD-10-CM

## 2017-10-23 NOTE — LETTER
[facility]  [address]  [place] [facility] [address] [phone]          October 31, 2017    Micki Santos                                                                                                                     [address] [facility] [address]            Dear Micki,    We have tried to contact you by phone but have been unable to connect with you.  We wanted to let you know that your thyroid medication was only filled for 10 days because we need you to come in for a lab only appointment to check your levels before refilling further. Please stop by the lab for a blood draw so that we can fill your prescription longer.       Call us at [phone] to schedule a lab only appointment        Sincerely,     Your [place] Care Team

## 2017-10-25 NOTE — TELEPHONE ENCOUNTER
Synthroid  Routing refill request to provider for review/approval because:  Labs out of range:  TSH  Marlene refills given    Neha Guardado, RN, BSN

## 2017-10-27 RX ORDER — LEVOTHYROXINE SODIUM 150 UG/1
150 TABLET ORAL DAILY
Qty: 10 TABLET | Refills: 0 | Status: SHIPPED | OUTPATIENT
Start: 2017-10-27 | End: 2018-05-14

## 2017-10-27 NOTE — TELEPHONE ENCOUNTER
Left detailed message per chart.   Please help pt schedule a lab only appointment.   Will postpone message until Monday to see if pt scheduled.   Mary Lou Quiñonez MA  October 27, 2017

## 2018-01-04 ENCOUNTER — MYC MEDICAL ADVICE (OUTPATIENT)
Dept: FAMILY MEDICINE | Facility: OTHER | Age: 58
End: 2018-01-04

## 2018-01-04 DIAGNOSIS — F33.0 MAJOR DEPRESSIVE DISORDER, RECURRENT EPISODE, MILD (H): ICD-10-CM

## 2018-01-04 DIAGNOSIS — I10 ESSENTIAL HYPERTENSION WITH GOAL BLOOD PRESSURE LESS THAN 140/90: ICD-10-CM

## 2018-01-04 RX ORDER — LISINOPRIL 20 MG/1
20 TABLET ORAL 2 TIMES DAILY
Qty: 60 TABLET | Refills: 0 | Status: SHIPPED | OUTPATIENT
Start: 2018-01-04 | End: 2018-02-12

## 2018-01-04 RX ORDER — SERTRALINE HYDROCHLORIDE 100 MG/1
TABLET, FILM COATED ORAL
Qty: 45 TABLET | Refills: 0 | Status: SHIPPED | OUTPATIENT
Start: 2018-01-04 | End: 2018-02-12

## 2018-01-04 RX ORDER — ATENOLOL 50 MG/1
50 TABLET ORAL DAILY
Qty: 30 TABLET | Refills: 0 | Status: SHIPPED | OUTPATIENT
Start: 2018-01-04 | End: 2018-02-12

## 2018-01-04 NOTE — TELEPHONE ENCOUNTER
Please update Phq-9 on phone and give short refills for 30 days until she can be seen in feb when she can get her labs.

## 2018-01-04 NOTE — TELEPHONE ENCOUNTER
RN - please review RK message below for short refills on medication. I sent a PHQ to patient via [employer].

## 2018-01-04 NOTE — TELEPHONE ENCOUNTER
Atenolol, Lisinopril, & Zoloft:  Patient is in [place] through March, but will be home for two weeks in February.   She is requesting refills of the above medication - RN unable to approve as labs and PHQ-9 are not up to date.   Three month refill pended per request.     Neha Guardado, RN, BSN

## 2018-01-05 ENCOUNTER — HEALTH MAINTENANCE LETTER (OUTPATIENT)
Age: 58
End: 2018-01-05

## 2018-01-10 DIAGNOSIS — I10 ESSENTIAL HYPERTENSION WITH GOAL BLOOD PRESSURE LESS THAN 140/90: ICD-10-CM

## 2018-01-10 DIAGNOSIS — F33.0 MAJOR DEPRESSIVE DISORDER, RECURRENT EPISODE, MILD (H): ICD-10-CM

## 2018-01-11 RX ORDER — SERTRALINE HYDROCHLORIDE 100 MG/1
TABLET, FILM COATED ORAL
Qty: 45 TABLET | Refills: 3 | OUTPATIENT
Start: 2018-01-11

## 2018-01-11 RX ORDER — ATENOLOL 50 MG/1
TABLET ORAL
Qty: 30 TABLET | Refills: 0 | OUTPATIENT
Start: 2018-01-11

## 2018-01-11 RX ORDER — LISINOPRIL 20 MG/1
TABLET ORAL
Qty: 60 TABLET | Refills: 0 | OUTPATIENT
Start: 2018-01-11

## 2018-01-11 NOTE — TELEPHONE ENCOUNTER
"Requested Prescriptions   Pending Prescriptions Disp Refills     atenolol (TENORMIN) 50 MG tablet [Pharmacy Med Name: Atenolol Oral Tablet 50 MG] 30 tablet 0     Sig: TAKE ONE TABLET BY MOUTH ONE TIME DAILY    Beta-Blockers Protocol Passed    1/11/2018  2:42 PM       Passed - Blood pressure under 140/90    BP Readings from Last 3 Encounters:   06/23/17 124/80   06/13/17 136/79   05/19/17 166/82          Passed - Patient is age 6 or older       Passed - Recent or future visit with authorizing provider's specialty    Patient had office visit in the last year or has a visit in the next 30 days with authorizing provider.  See \"Patient Info\" tab in inbasket, or \"Choose Columns\" in Meds & Orders section of the refill encounter.           sertraline (ZOLOFT) 100 MG tablet [Pharmacy Med Name: Sertraline HCl Oral Tablet 100 MG] 45 tablet 3     Sig: TAKE 1 & 1/2 TABLETS BY MOUTH ONCE DAILY    SSRIs Protocol Failed    1/11/2018  2:42 PM       Failed - PHQ-9 score less than 5 in past 6 months    The PHQ-9 criteria is meant to fail. It requires a PHQ-9 score review         Failed - Recent (6 mo) or future visit with authorizing provider's specialty    Patient had office visit in the last 6 months or has a visit in the next 30 days with authorizing provider.  See \"Patient Info\" tab in inbasket, or \"Choose Columns\" in Meds & Orders section of the refill encounter.          Passed - Patient is age 18 or older       Passed - No active pregnancy on record       Passed - No positive pregnancy test in last 12 months        lisinopril (PRINIVIL/ZESTRIL) 20 MG tablet [Pharmacy Med Name: Lisinopril Oral Tablet 20 MG] 60 tablet 0     Sig: TAKE ONE TABLET BY MOUTH TWICE DAILY    ACE Inhibitors (Including Combos) Protocol Failed    1/11/2018  2:42 PM       Failed - Normal serum creatinine on file in past 12 months    Recent Labs   Lab Test  10/11/16   1103   CR  0.88            Failed - Normal serum potassium on file in past 12 months    " "Recent Labs   Lab Test  10/11/16   1103   POTASSIUM  4.8            Passed - Blood pressure under 140/90    BP Readings from Last 3 Encounters:   06/23/17 124/80   06/13/17 136/79   05/19/17 166/82          Passed - Recent or future visit with authorizing provider's specialty    Patient had office visit in the last year or has a visit in the next 30 days with authorizing provider.  See \"Patient Info\" tab in inbasket, or \"Choose Columns\" in Meds & Orders section of the refill encounter.          Passed - Patient is age 18 or older       Passed - No active pregnancy on record       Passed - No positive pregnancy test in past 12 months        atenolol (TENORMIN) 50 MG tablet  Rx was sent 01/04/2018 for 30 tabs and 0 refills. Patient should have medication through 02/04/2018 due for OV.  Pharmacy notified via E-prescribe refusal.  Kymberly Bowen RN, BSN     lisinopril (PRINIVIL/ZESTRIL) 20 MG tablet  Rx was sent 01/04/2018 for 60 tabs and 0 refills. Patient should have medication through 02/04/2018 due for OV.  Pharmacy notified via E-prescribe refusal.  Kymberly Bowen RN, BSN     sertraline (ZOLOFT) 100 MG tablet  Rx was sent 01/04/2018 for 45 tabs and 0 refills. Patient should have medication through 02/04/2018 due for OV.  Pharmacy notified via E-prescribe refusal.  Kymberly Bowen RN, BSN  "

## 2018-01-12 ENCOUNTER — TELEPHONE (OUTPATIENT)
Dept: FAMILY MEDICINE | Facility: OTHER | Age: 58
End: 2018-01-12

## 2018-01-12 DIAGNOSIS — F33.0 MAJOR DEPRESSIVE DISORDER, RECURRENT EPISODE, MILD (H): ICD-10-CM

## 2018-01-12 DIAGNOSIS — I10 ESSENTIAL HYPERTENSION WITH GOAL BLOOD PRESSURE LESS THAN 140/90: ICD-10-CM

## 2018-01-12 NOTE — LETTER
[facility]  [address]   [facility] [address]  Phone: [phone]  January 30, 2018      Micki Santos  [address] [facility] [address]      Dear Micki,    We care about your health and have reviewed your health plan including your medical conditions, medications, and lab results.  Based on this review, it is recommended that you follow up regarding the following health topic(s):  -Depression  -High Blood Pressure  -Breast Cancer Screening  -Colon Cancer Screening  -Cervical Cancer Screening    We recommend you take the following action(s):  -schedule a FOLLOWUP OFFICE APPOINTMENT.  We will perform the following labs:  BMP (basic metabolic panel) and TSH (thyroid test).  -schedule a MAMMOGRAM which is due. Please disregard this reminder if you have had this exam elsewhere within the last 1-2 years please let us know so we can update your records.  -schedule a COLONOSCOPY to look for colon cancer (due every 10 years or 5 years in higher risk situations.)  Colonoscopies can prevent 90-95% of colon cancer deaths.  Problem lesions can be removed before they ever become cancer.  If you do not wish to do a colonoscopy or cannot afford to do one at this time, there is another option called a Fecal Immunochemical Occult Blood Test (FIT) a take home stool sample kit.  It does not replace the colonoscopy for colorectal cancer screening, but it can detect hidden bleeding in the lower colon.  It does need to be repeated every year and if a positive result is obtained, you would be referred for a colonoscopy.  If you have completed either one of these tests at another facility, please have the records sent to our clinic for our records.  -schedule a PAP SMEAR EXAM which is due.  Please disregard this reminder if you have had this exam elsewhere within the last year.  It would be helpful for us to have a copy of your recent pap smear report to update your records.         Please call us at the [facility] - [phone] (or use [employer]) to address the above recommendations.     Thank you for trusting [facility] and we appreciate the opportunity to serve you.  We look forward to supporting your healthcare needs in the future.    Healthy Regards,    Your Health Care Team  [facility]

## 2018-01-12 NOTE — TELEPHONE ENCOUNTER
Panel Management Review      Patient has the following on her problem list:     Depression / Dysthymia review    Measure:  Needs PHQ-9 score of 4 or less during index window.  Administer PHQ-9 and if score is 5 or more, send encounter to provider for next steps.    5   7 month window range:     PHQ-9 SCORE 12/11/2015 10/11/2016 5/16/2017   Total Score - - -   Total Score MyChart - - 3 (Minimal depression)   Total Score 1 2 -       If PHQ-9 recheck is 5 or more, route to provider for next steps.    Patient is due for:  PHQ9    Hypertension   Last three blood pressure readings:  BP Readings from Last 3 Encounters:   06/23/17 124/80   06/13/17 136/79   05/19/17 166/82     Blood pressure: Passed    HTN Guidelines:  Age 18-59 BP range:  Less than 140/90  Age 60-85 with Diabetes:  Less than 140/90  Age 60-85 without Diabetes:  less than 150/90          Composite cancer screening  Chart review shows that this patient is due/due soon for the following Pap Smear, Mammogram and Fecal Colorectal (FIT)  Summary:    Patient is due/failing the following:   Tsh, BMP, FOLLOW UP, FIT, MAMMOGRAM, PAP and PHQ9    Action needed:   Patient needs office visit for Depression/HTN and labs.    Type of outreach:    Phone, left message for patient to call back.     Questions for provider review:    None                                                                                                                                    Lillian Bui CMA        Chart routed to Care Team .

## 2018-02-12 DIAGNOSIS — I10 ESSENTIAL HYPERTENSION WITH GOAL BLOOD PRESSURE LESS THAN 140/90: ICD-10-CM

## 2018-02-12 DIAGNOSIS — F33.0 MAJOR DEPRESSIVE DISORDER, RECURRENT EPISODE, MILD (H): ICD-10-CM

## 2018-02-14 NOTE — TELEPHONE ENCOUNTER
lisinopril (PRINIVIL/ZESTRIL) 20 MG tablet  Routing refill request to provider for review/approval because:  Marlene given x1 and patient did not follow up, please advise  Labs not up to date: Creatine and potassium   Patient needs to be seen because:  Due for OV now  In [place] for 2 weeks of Feb, then returning to [place] till March    atenolol (TENORMIN) 50 MG tablet  Routing refill request to provider for review/approval because:  Marlene given x1 and patient did not follow up, please advise  Labs not up to date: Creatine and potassium   Patient needs to be seen because:  Due for OV now  In [place] for 2 weeks of Feb, then returning to [place] till March    sertraline (ZOLOFT) 100 MG tablet  Routing refill request to provider for review/approval because:  Marlene given x1 and patient did not follow up, please advise  Labs not up to date: Creatine and potassium   Patient needs to be seen because:  Due for OV now  In [place] for 2 weeks of Feb, then returning to [place] till March  Due for updated PHQ-9  PHQ-9 SCORE 12/11/2015 10/11/2016 5/16/2017   Total Score - - -   Total Score MyChart - - 3 (Minimal depression)   Total Score 1 2 -     Creatinine   Date Value Ref Range Status   10/11/2016 0.88 0.52 - 1.04 mg/dL Final   ]  Potassium   Date Value Ref Range Status   10/11/2016 4.8 3.4 - 5.3 mmol/L Final   ]    Please assist with scheduling.    Kymberly Bowen, RN, BSN

## 2018-02-16 RX ORDER — LISINOPRIL 20 MG/1
TABLET ORAL
Qty: 60 TABLET | Refills: 1 | Status: SHIPPED | OUTPATIENT
Start: 2018-02-16 | End: 2018-05-14

## 2018-02-16 RX ORDER — ATENOLOL 50 MG/1
TABLET ORAL
Qty: 30 TABLET | Refills: 1 | Status: SHIPPED | OUTPATIENT
Start: 2018-02-16 | End: 2018-05-14

## 2018-02-16 RX ORDER — SERTRALINE HYDROCHLORIDE 100 MG/1
TABLET, FILM COATED ORAL
Qty: 45 TABLET | Refills: 1 | Status: SHIPPED | OUTPATIENT
Start: 2018-02-16 | End: 2018-05-04

## 2018-02-16 NOTE — TELEPHONE ENCOUNTER
I don't know what 2 weeks in Feb she was back in town for, but will give refill to April when she returns to [facility], needs appointment then.

## 2018-04-16 ENCOUNTER — TELEPHONE (OUTPATIENT)
Dept: FAMILY MEDICINE | Facility: OTHER | Age: 58
End: 2018-04-16

## 2018-04-16 NOTE — LETTER
[facility]  [address]   [facility] [address]  Phone: [phone]  May 1, 2018      Micki Santos  [address] [facility] [address]      Dear Micki,    We care about your health and have reviewed your health plan including your medical conditions, medications, and lab results.  Based on this review, it is recommended that you follow up regarding the following health topic(s):  -High Blood Pressure  -Breast Cancer Screening  -Colon Cancer Screening  -Wellness (Physical) Visit     We recommend you take the following action(s):  -schedule a WELLNESS (Physical) APPOINTMENT.  We will perform the following labs: TSH (thyroid test).     Please call us at the [facility] - [phone] (or use [employer]) to address the above recommendations.     Thank you for trusting [facility] and we appreciate the opportunity to serve you.  We look forward to supporting your healthcare needs in the future.    Healthy Regards,    Your Health Care Team  [facility]

## 2018-04-16 NOTE — TELEPHONE ENCOUNTER
Panel Management Review      Patient has the following on her problem list:     Depression / Dysthymia review    Measure:  Needs PHQ-9 score of 4 or less during index window.  Administer PHQ-9 and if score is 5 or more, send encounter to provider for next steps.    5   7 month window range:     PHQ-9 SCORE 12/11/2015 10/11/2016 5/16/2017   Total Score - - -   Total Score MyChart - - 3 (Minimal depression)   Total Score 1 2 -       If PHQ-9 recheck is 5 or more, route to provider for next steps.    Patient is due for:  PHQ9    Hypertension   Last three blood pressure readings:  BP Readings from Last 3 Encounters:   06/23/17 124/80   06/13/17 136/79   05/19/17 166/82     Blood pressure: Passed    HTN Guidelines:  Age 18-59 BP range:  Less than 140/90  Age 60-85 with Diabetes:  Less than 140/90  Age 60-85 without Diabetes:  less than 150/90      Composite cancer screening  Chart review shows that this patient is due/due soon for the following Pap Smear, Mammogram and Fecal Colorectal (FIT)  Summary:    Patient is due/failing the following:   Physical, TSH, mammo, pap, phq9 and FIT    Action needed:   Patient needs office visit for Physical, mammo, FIT and non-fasting labs    Type of outreach:    Phone, left message for patient to call back.     Questions for provider review:    None                                                                                                                                    Shantel Stern MA       Chart routed to Care Team .

## 2018-05-04 DIAGNOSIS — F33.0 MAJOR DEPRESSIVE DISORDER, RECURRENT EPISODE, MILD (H): ICD-10-CM

## 2018-05-04 DIAGNOSIS — I10 ESSENTIAL HYPERTENSION WITH GOAL BLOOD PRESSURE LESS THAN 140/90: ICD-10-CM

## 2018-05-04 NOTE — LETTER
May 9, 2018      Micki Santos  [address] [facility] [address]        Dear Micki,     We have attempted to reach you by phone and have been unsuccessful. This letter is to inform you that we received a refill request for your Atenolol and unfortunately it has been denied. You will need an appointment to discuss further refills.  If you have any further questions/concerns or if you would like to schedule, please call the clinic at [phone] or send us a [employer] message.    Thank you,  [place] Care Team

## 2018-05-04 NOTE — TELEPHONE ENCOUNTER
Routing refill request to provider for review/approval because:  Marlene given x1 and patient did not follow up, please advise    Aminah Hagan RN

## 2018-05-07 RX ORDER — SERTRALINE HYDROCHLORIDE 100 MG/1
TABLET, FILM COATED ORAL
Qty: 45 TABLET | Refills: 0 | Status: SHIPPED | OUTPATIENT
Start: 2018-05-07 | End: 2018-05-14

## 2018-05-07 RX ORDER — ATENOLOL 50 MG/1
TABLET ORAL
Qty: 15 TABLET | Refills: 0 | OUTPATIENT
Start: 2018-05-07

## 2018-05-07 NOTE — TELEPHONE ENCOUNTER
Left detailed message informing patient of refill. Advised patient to return call to clinic to schedule OV.    Latasha Machado MA

## 2018-05-07 NOTE — TELEPHONE ENCOUNTER
"Requested Prescriptions   Pending Prescriptions Disp Refills     sertraline (ZOLOFT) 100 MG tablet 45 tablet 1    SSRIs Protocol Failed    5/4/2018  3:45 PM       Failed - PHQ-9 score less than 5 in past 6 months    Please review last PHQ-9 score.          Failed - Recent (6 mo) or future (30 days) visit within the authorizing provider's specialty    Patient had office visit in the last 6 months or has a visit in the next 30 days with authorizing provider or within the authorizing provider's specialty.  See \"Patient Info\" tab in inbasket, or \"Choose Columns\" in Meds & Orders section of the refill encounter.           Passed - Patient is age 18 or older       Passed - No active pregnancy on record       Passed - No positive pregnancy test in last 12 months        Routing refill request to provider for review/approval because:  Patient needs to be seen because:  Pt hasn't had a mood f/u since 5/19/17    Aminah Hagan RN          "

## 2018-05-07 NOTE — TELEPHONE ENCOUNTER
LMTC  Please inform patient of the message below and assist with scheduling.  Kenyatta Casarez, CMA

## 2018-05-07 NOTE — TELEPHONE ENCOUNTER
Will provide 1 final shane refill but she needs an OV as she has had shane refills on her other meds as well.    Yony Santos PA-C

## 2018-05-08 NOTE — TELEPHONE ENCOUNTER
Left message informing patient to return call to clinic to schedule OV in order to get refills on her medications.    Latasha Machado MA

## 2018-05-09 NOTE — TELEPHONE ENCOUNTER
Left message for patient to call back. Please see message below. 3rd attempt - letter sent.  Farzana Mathis CMA

## 2018-05-14 ENCOUNTER — OFFICE VISIT (OUTPATIENT)
Dept: FAMILY MEDICINE | Facility: OTHER | Age: 58
End: 2018-05-14
Payer: COMMERCIAL

## 2018-05-14 VITALS
BODY MASS INDEX: 43.59 KG/M2 | HEART RATE: 80 BPM | WEIGHT: 246 LBS | RESPIRATION RATE: 16 BRPM | HEIGHT: 63 IN | DIASTOLIC BLOOD PRESSURE: 84 MMHG | OXYGEN SATURATION: 98 % | TEMPERATURE: 97.8 F | SYSTOLIC BLOOD PRESSURE: 136 MMHG

## 2018-05-14 DIAGNOSIS — I10 HYPERTENSION, GOAL BELOW 140/90: ICD-10-CM

## 2018-05-14 DIAGNOSIS — I10 ESSENTIAL HYPERTENSION WITH GOAL BLOOD PRESSURE LESS THAN 140/90: ICD-10-CM

## 2018-05-14 DIAGNOSIS — Z12.31 VISIT FOR SCREENING MAMMOGRAM: ICD-10-CM

## 2018-05-14 DIAGNOSIS — Z12.11 SCREEN FOR COLON CANCER: ICD-10-CM

## 2018-05-14 DIAGNOSIS — F33.0 MAJOR DEPRESSIVE DISORDER, RECURRENT EPISODE, MILD (H): ICD-10-CM

## 2018-05-14 DIAGNOSIS — F32.0 MILD MAJOR DEPRESSION (H): ICD-10-CM

## 2018-05-14 DIAGNOSIS — J31.0 CHRONIC RHINITIS, UNSPECIFIED TYPE: ICD-10-CM

## 2018-05-14 DIAGNOSIS — Z12.4 SCREENING FOR MALIGNANT NEOPLASM OF CERVIX: ICD-10-CM

## 2018-05-14 DIAGNOSIS — E03.9 HYPOTHYROIDISM, UNSPECIFIED TYPE: Primary | ICD-10-CM

## 2018-05-14 LAB
ALBUMIN SERPL-MCNC: 4.1 G/DL (ref 3.4–5)
ALP SERPL-CCNC: 120 U/L (ref 40–150)
ALT SERPL W P-5'-P-CCNC: 28 U/L (ref 0–50)
ANION GAP SERPL CALCULATED.3IONS-SCNC: 9 MMOL/L (ref 3–14)
AST SERPL W P-5'-P-CCNC: 16 U/L (ref 0–45)
BILIRUB SERPL-MCNC: 0.3 MG/DL (ref 0.2–1.3)
BUN SERPL-MCNC: 16 MG/DL (ref 7–30)
CALCIUM SERPL-MCNC: 9.1 MG/DL (ref 8.5–10.1)
CHLORIDE SERPL-SCNC: 104 MMOL/L (ref 94–109)
CO2 SERPL-SCNC: 27 MMOL/L (ref 20–32)
CREAT SERPL-MCNC: 0.88 MG/DL (ref 0.52–1.04)
GFR SERPL CREATININE-BSD FRML MDRD: 66 ML/MIN/1.7M2
GLUCOSE SERPL-MCNC: 86 MG/DL (ref 70–99)
POTASSIUM SERPL-SCNC: 4.6 MMOL/L (ref 3.4–5.3)
PROT SERPL-MCNC: 8.2 G/DL (ref 6.8–8.8)
SODIUM SERPL-SCNC: 140 MMOL/L (ref 133–144)
T4 FREE SERPL-MCNC: 0.67 NG/DL (ref 0.76–1.46)
TSH SERPL DL<=0.005 MIU/L-ACNC: 9.02 MU/L (ref 0.4–4)

## 2018-05-14 PROCEDURE — 84443 ASSAY THYROID STIM HORMONE: CPT | Performed by: FAMILY MEDICINE

## 2018-05-14 PROCEDURE — 36415 COLL VENOUS BLD VENIPUNCTURE: CPT | Performed by: FAMILY MEDICINE

## 2018-05-14 PROCEDURE — 99214 OFFICE O/P EST MOD 30 MIN: CPT | Performed by: FAMILY MEDICINE

## 2018-05-14 PROCEDURE — 80053 COMPREHEN METABOLIC PANEL: CPT | Performed by: FAMILY MEDICINE

## 2018-05-14 PROCEDURE — 84439 ASSAY OF FREE THYROXINE: CPT | Performed by: FAMILY MEDICINE

## 2018-05-14 RX ORDER — LISINOPRIL 20 MG/1
20 TABLET ORAL DAILY
Qty: 90 TABLET | Refills: 1 | Status: SHIPPED | OUTPATIENT
Start: 2018-05-14 | End: 2019-02-05

## 2018-05-14 RX ORDER — ATENOLOL 50 MG/1
50 TABLET ORAL DAILY
Qty: 90 TABLET | Refills: 1 | Status: SHIPPED | OUTPATIENT
Start: 2018-05-14 | End: 2018-12-19

## 2018-05-14 RX ORDER — LEVOTHYROXINE SODIUM 150 UG/1
150 TABLET ORAL DAILY
Qty: 60 TABLET | Refills: 0 | Status: SHIPPED | OUTPATIENT
Start: 2018-05-14 | End: 2018-07-17

## 2018-05-14 RX ORDER — SERTRALINE HYDROCHLORIDE 100 MG/1
TABLET, FILM COATED ORAL
Qty: 135 TABLET | Refills: 1 | Status: SHIPPED | OUTPATIENT
Start: 2018-05-14 | End: 2019-02-05

## 2018-05-14 RX ORDER — MOMETASONE FUROATE MONOHYDRATE 50 UG/1
2 SPRAY, METERED NASAL DAILY
Qty: 1 BOX | Refills: 11 | Status: SHIPPED | OUTPATIENT
Start: 2018-05-14 | End: 2020-01-22

## 2018-05-14 ASSESSMENT — ANXIETY QUESTIONNAIRES
5. BEING SO RESTLESS THAT IT IS HARD TO SIT STILL: NOT AT ALL
GAD7 TOTAL SCORE: 2
6. BECOMING EASILY ANNOYED OR IRRITABLE: SEVERAL DAYS
7. FEELING AFRAID AS IF SOMETHING AWFUL MIGHT HAPPEN: NOT AT ALL
GAD7 TOTAL SCORE: 2
3. WORRYING TOO MUCH ABOUT DIFFERENT THINGS: SEVERAL DAYS
2. NOT BEING ABLE TO STOP OR CONTROL WORRYING: NOT AT ALL
7. FEELING AFRAID AS IF SOMETHING AWFUL MIGHT HAPPEN: NOT AT ALL
1. FEELING NERVOUS, ANXIOUS, OR ON EDGE: NOT AT ALL
4. TROUBLE RELAXING: NOT AT ALL
GAD7 TOTAL SCORE: 2

## 2018-05-14 ASSESSMENT — PATIENT HEALTH QUESTIONNAIRE - PHQ9
SUM OF ALL RESPONSES TO PHQ QUESTIONS 1-9: 11
10. IF YOU CHECKED OFF ANY PROBLEMS, HOW DIFFICULT HAVE THESE PROBLEMS MADE IT FOR YOU TO DO YOUR WORK, TAKE CARE OF THINGS AT HOME, OR GET ALONG WITH OTHER PEOPLE: VERY DIFFICULT
SUM OF ALL RESPONSES TO PHQ QUESTIONS 1-9: 11

## 2018-05-14 ASSESSMENT — PAIN SCALES - GENERAL: PAINLEVEL: SEVERE PAIN (7)

## 2018-05-14 NOTE — PROGRESS NOTES
SUBJECTIVE:   Micki Santos is a 57 year old female who presents to clinic today for the following health issues:      History of Present Illness     Depression & Anxiety Follow-up:     Depression/Anxiety:  Depression only    Status since last visit::  Stable    Other associated symptoms of depression::  None    Significant life event::  No    Current substance use::  None    Anxiety/Panic symptoms::  No       Today's PHQ-9         PHQ-9 Total Score:     (P) 11   PHQ-9 Q9 Suicidal ideation:   (P) Not at all   Thoughts of suicide or self harm:      Self-harm Plan:        Self-harm Action:          Safety concerns for self or others:       FATMATA-7 Total Score: (P) 2    Answers for HPI/ROS submitted by the patient on 5/14/2018   FATMATA 7 TOTAL SCORE: 2  If you checked off any problems, how difficult have these problems made it for you to do your work, take care of things at home, or get along with other people?: Very difficult  PHQ9 TOTAL SCORE: 11    Hypertension Follow-up      Outpatient blood pressures are being checked at work.  Results are 130's.    Low Salt Diet: not monitoring salt    Problem list and histories reviewed & adjusted, as indicated.  Additional history: as documented        Patient Active Problem List   Diagnosis     Esophageal reflux     Mild major depression (H)     Pain in thoracic spine     Hypertension, goal below 140/90     Family history of skin cancer     Multiple nevi     Hypothyroidism     Morbid obesity, unspecified obesity type (H)     Past Surgical History:   Procedure Laterality Date     C REMOVAL OF OVARY(S)      left ovary removed-cyst, uterine fibroid removed     CHOLECYSTECTOMY, LAPOROSCOPIC  07/24/06    Cholecystectomy, Laparoscopic     ESOPHAGOSCOPY, GASTROSCOPY, DUODENOSCOPY (EGD), COMBINED N/A 2/5/2016    Procedure: COMBINED ESOPHAGOSCOPY, GASTROSCOPY, DUODENOSCOPY (EGD), BIOPSY SINGLE OR MULTIPLE;  Surgeon: Del Betts MD;  Location:  GI     GENITOURINARY SURGERY       removal of left ovary with cyst and uterine fibroid     HC REMOVAL OF TONSILS,12+ Y/O      Tonsils 11+y.o.     HC UGI ENDOSCOPY, SIMPLE EXAM  09       Social History   Substance Use Topics     Smoking status: Never Smoker     Smokeless tobacco: Never Used      Comment: No smokers in home     Alcohol use Yes      Comment: 1-2 glasses of wine/week     Family History   Problem Relation Age of Onset     C.A.D. Maternal Grandmother      C.A.D. Maternal Grandfather      C.A.D. Paternal Grandfather       age 38     Respiratory Mother      sarcoidosis     Hypertension Mother      chronic     Depression Mother      10+ years     DIABETES Father      Type II diabetes     Coronary Artery Disease Father      Heart Attack with Angioplasty/Stent     Arthritis No family hx of          Current Outpatient Prescriptions   Medication Sig Dispense Refill     atenolol (TENORMIN) 50 MG tablet Take 1 tablet (50 mg) by mouth daily 90 tablet 1     levothyroxine (SYNTHROID/LEVOTHROID) 150 MCG tablet Take 1 tablet (150 mcg) by mouth daily Overdue for labs. 60 tablet 0     lisinopril (PRINIVIL/ZESTRIL) 20 MG tablet Take 1 tablet (20 mg) by mouth daily 90 tablet 1     mometasone (NASONEX) 50 MCG/ACT spray Spray 2 sprays into both nostrils daily 1 Box 11     pantoprazole (PROTONIX) 40 MG EC tablet Take 1 tablet (40 mg) by mouth daily 90 tablet 3     sertraline (ZOLOFT) 100 MG tablet TAKE 1 & 1/2 TABLETS DAILY 135 tablet 1     Allergies   Allergen Reactions     No Known Drug Allergies      BP Readings from Last 3 Encounters:   18 136/84   17 124/80   17 136/79    Wt Readings from Last 3 Encounters:   18 246 lb (111.6 kg)   17 243 lb (110.2 kg)   17 240 lb (108.9 kg)                  Labs reviewed in EPIC    ROS:  Constitutional, HEENT, cardiovascular, pulmonary, GI, , musculoskeletal, neuro, skin, endocrine and psych systems are negative, except as otherwise noted.    OBJECTIVE:     BP  "136/84 (BP Location: Right arm, Patient Position: Chair, Cuff Size: Adult Large)  Pulse 80  Temp 97.8  F (36.6  C) (Temporal)  Resp 16  Ht 5' 3.4\" (1.61 m)  Wt 246 lb (111.6 kg)  LMP 07/29/2003  SpO2 98%  BMI 43.03 kg/m2  Body mass index is 43.03 kg/(m^2).   Physical Exam   Constitutional: She is oriented to person, place, and time. She appears well-developed and well-nourished.   HENT:   Head: Normocephalic and atraumatic.   Right Ear: External ear normal.   Left Ear: External ear normal.   Mouth/Throat: Oropharynx is clear and moist.   Eyes: EOM are normal.   Neck: Neck supple.   Cardiovascular: Normal rate, regular rhythm and normal heart sounds.    Pulmonary/Chest: Effort normal and breath sounds normal.   Abdominal: Soft. Bowel sounds are normal.   Musculoskeletal: Normal range of motion.   Neurological: She is alert and oriented to person, place, and time.   Psychiatric: She has a normal mood and affect. Her behavior is normal. Judgment and thought content normal.         Diagnostic Test Results:  none     ASSESSMENT/PLAN:     Problem List Items Addressed This Visit     Mild major depression (H)     Stable  Continue current dose of zoloft         Relevant Medications    sertraline (ZOLOFT) 100 MG tablet    Hypertension, goal below 140/90     weel controlled  Continue atenolol and lisinopril  Refill meds  Recheck in 6 month         Relevant Medications    atenolol (TENORMIN) 50 MG tablet    lisinopril (PRINIVIL/ZESTRIL) 20 MG tablet    Hypothyroidism - Primary     Low  Free T4 as she has been out of meds for few months  Restart meds  Needs recheck in 6-8 wks           Relevant Medications    levothyroxine (SYNTHROID/LEVOTHROID) 150 MCG tablet    Other Relevant Orders    TSH with free T4 reflex (Completed)    TSH with free T4 reflex    T4 free (Completed)      Other Visit Diagnoses     Screen for colon cancer        Relevant Orders    Fecal colorectal cancer screen (FIT)    Visit for screening mammogram  "       Screening for malignant neoplasm of cervix        Essential hypertension with goal blood pressure less than 140/90        Relevant Medications    atenolol (TENORMIN) 50 MG tablet    lisinopril (PRINIVIL/ZESTRIL) 20 MG tablet    Other Relevant Orders    Comprehensive metabolic panel (BMP + Alb, Alk Phos, ALT, AST, Total. Bili, TP) (Completed)    Chronic rhinitis, unspecified type        Relevant Medications    mometasone (NASONEX) 50 MCG/ACT spray    Major depressive disorder, recurrent episode, mild (H)        Relevant Medications    sertraline (ZOLOFT) 100 MG tablet           Era Bedolla MD  [facility]

## 2018-05-14 NOTE — LETTER
My Depression Action Plan  Name: Micki Santos   Date of Birth 1960  Date: 5/11/2018    My doctor: Miranda Bosch   My clinic: [address]  [facility] [id number] [phone]          GREEN    ZONE   Good Control    What it looks like:     Things are going generally well. You have normal up s and down s. You may even feel depressed from time to time, but bad moods usually last less than a day.   What you need to do:  1. Continue to care for yourself (see self care plan)  2. Check your depression survival kit and update it as needed  3. Follow your physician s recommendations including any medication.  4. Do not stop taking medication unless you consult with your physician first.           YELLOW         ZONE Getting Worse    What it looks like:     Depression is starting to interfere with your life.     It may be hard to get out of bed; you may be starting to isolate yourself from others.    Symptoms of depression are starting to last most all day and this has happened for several days.     You may have suicidal thoughts but they are not constant.   What you need to do:     1. Call your care team, your response to treatment will improve if you keep your care team informed of your progress. Yellow periods are signs an adjustment may need to be made.     2. Continue your self-care, even if you have to fake it!    3. Talk to someone in your support network    4. Open up your depression survival kit           RED    ZONE Medical Alert - Get Help    What it looks like:     Depression is seriously interfering with your life.     You may experience these or other symptoms: You can t get out of bed most days, can t work or engage in other necessary activities, you have trouble taking care of basic hygiene, or basic responsibilities, thoughts of suicide or death that will not go away, self-injurious behavior.     What you need to do:  1. Call your care  team and request a same-day appointment. If they are not available (weekends or after hours) call your local crisis line, emergency room or 911.            Depression Self Care Plan / Survival Kit    Self-Care for Depression  Here s the deal. Your body and mind are really not as separate as most people think.  What you do and think affects how you feel and how you feel influences what you do and think. This means if you do things that people who feel good do, it will help you feel better.  Sometimes this is all it takes.  There is also a place for medication and therapy depending on how severe your depression is, so be sure to consult with your medical provider and/ or Behavioral Health Consultant if your symptoms are worsening or not improving.     In order to better manage my stress, I will:    Exercise  Get some form of exercise, every day. This will help reduce pain and release endorphins, the  feel good  chemicals in your brain. This is almost as good as taking antidepressants!  This is not the same as joining a gym and then never going! (they count on that by the way ) It can be as simple as just going for a walk or doing some gardening, anything that will get you moving.      Hygiene   Maintain good hygiene (Get out of bed in the morning, Make your bed, Brush your teeth, Take a shower, and Get dressed like you were going to work, even if you are unemployed).  If your clothes don't fit try to get ones that do.    Diet  I will strive to eat foods that are good for me, drink plenty of water, and avoid excessive sugar, caffeine, alcohol, and other mood-altering substances.  Some foods that are helpful in depression are: complex carbohydrates, B vitamins, flaxseed, fish or fish oil, fresh fruits and vegetables.    Psychotherapy  I agree to participate in Individual Therapy (if recommended).    Medication  If prescribed medications, I agree to take them.  Missing doses can result in serious side effects.  I  understand that drinking alcohol, or other illicit drug use, may cause potential side effects.  I will not stop my medication abruptly without first discussing it with my provider.    Staying Connected With Others  I will stay in touch with my friends, family members, and my primary care provider/team.    Use your imagination  Be creative.  We all have a creative side; it doesn t matter if it s oil painting, sand castles, or mud pies! This will also kick up the endorphins.    Witness Beauty  (AKA stop and smell the roses) Take a look outside, even in mid-winter. Notice colors, textures. Watch the squirrels and birds.     Service to others  Be of service to others.  There is always someone else in need.  By helping others we can  get out of ourselves  and remember the really important things.  This also provides opportunities for practicing all the other parts of the program.    Humor  Laugh and be silly!  Adjust your TV habits for less news and crime-drama and more comedy.    Control your stress  Try breathing deep, massage therapy, biofeedback, and meditation. Find time to relax each day.     My support system    Clinic Contact:  Phone number:    Contact 1:  Phone number:    Contact 2:  Phone number:    Scientologist/:  Phone number:    Therapist:  Phone number:    Local crisis center:    Phone number:    Other community support:  Phone number:

## 2018-05-14 NOTE — MR AVS SNAPSHOT
After Visit Summary   5/14/2018    Micki Santos    MRN: [id number]           Patient Information     Date Of Birth          1960        Visit Information        Provider Department      5/14/2018 4:00 PM Era Bedolla MD [facility]        Today's Diagnoses     Hypothyroidism, unspecified type    -  1    Screen for colon cancer        Visit for screening mammogram        Screening for malignant neoplasm of cervix        Essential hypertension with goal blood pressure less than 140/90        Chronic rhinitis, unspecified type        Major depressive disorder, recurrent episode, mild (H)           Follow-ups after your visit        Follow-up notes from your care team     Return in about 2 months (around 7/14/2018).      Future tests that were ordered for you today     Open Future Orders        Priority Expected Expires Ordered    TSH with free T4 reflex Routine 7/14/2018 5/14/2019 5/14/2018    Fecal colorectal cancer screen (FIT) Routine 6/4/2018 8/6/2018 5/14/2018            Who to contact     If you have questions or need follow up information about today's clinic visit or your schedule please contact [facility] directly at [phone].  Normal or non-critical lab and imaging results will be communicated to you by [employer], letter or phone within 4 business days after the clinic has received the results. If you do not hear from us within 7 days, please contact the clinic through [employer] or phone. If you have a critical or abnormal lab result, we will notify you by phone as soon as possible.  Submit refill requests through [employer] or call your pharmacy and they will forward the refill request to us. Please allow 3 business days for your refill to be completed.          Additional Information About Your Visit        [employer] Information     [employer] gives you secure access to your electronic health record. If you see a primary care provider, you can also send  "messages to your care team and make appointments. If you have questions, please call your primary care clinic.  If you do not have a primary care provider, please call [phone] and they will assist you.        Care EveryWhere ID     This is your Care EveryWhere ID. This could be used by other organizations to access your [facility] medical records  [id number]        Your Vitals Were     Pulse Temperature Respirations Height Last Period Pulse Oximetry    80 97.8  F (36.6  C) (Temporal) 16 5' 3.4\" (1.61 m) 07/29/2003 98%    BMI (Body Mass Index)                   43.03 kg/m2            Blood Pressure from Last 3 Encounters:   05/14/18 136/84   06/23/17 124/80   06/13/17 136/79    Weight from Last 3 Encounters:   05/14/18 246 lb (111.6 kg)   06/23/17 243 lb (110.2 kg)   05/19/17 240 lb (108.9 kg)              We Performed the Following     Comprehensive metabolic panel (BMP + Alb, Alk Phos, ALT, AST, Total. Bili, TP)     DEPRESSION ACTION PLAN (DAP)     TSH with free T4 reflex          Today's Medication Changes          These changes are accurate as of 5/14/18  4:47 PM.  If you have any questions, ask your nurse or doctor.               These medicines have changed or have updated prescriptions.        Dose/Directions    atenolol 50 MG tablet   Commonly known as:  TENORMIN   This may have changed:  See the new instructions.   Used for:  Essential hypertension with goal blood pressure less than 140/90   Changed by:  Era Bedolla MD        Dose:  50 mg   Take 1 tablet (50 mg) by mouth daily   Quantity:  90 tablet   Refills:  1       levothyroxine 150 MCG tablet   Commonly known as:  SYNTHROID/LEVOTHROID   This may have changed:  Another medication with the same name was removed. Continue taking this medication, and follow the directions you see here.   Used for:  Hypothyroidism, unspecified type   Changed by:  Era Bedolla MD        Dose:  150 mcg   Take 1 tablet (150 mcg) by mouth daily Overdue for labs. "   Quantity:  60 tablet   Refills:  0       lisinopril 20 MG tablet   Commonly known as:  PRINIVIL/ZESTRIL   This may have changed:  See the new instructions.   Used for:  Essential hypertension with goal blood pressure less than 140/90   Changed by:  Era Bedolla MD        Dose:  20 mg   Take 1 tablet (20 mg) by mouth daily   Quantity:  90 tablet   Refills:  1            Where to get your medicines      These medications were sent to [facility] [id number] [facility]  [id number] [place] [address]     Phone:  [phone]     atenolol 50 MG tablet    levothyroxine 150 MCG tablet    lisinopril 20 MG tablet    mometasone 50 MCG/ACT spray    sertraline 100 MG tablet                Primary Care Provider Office Phone # Fax #    Miranda CHRISTOPHER Bosch [phone]       [address] [address]  [facility] [address]        Equal Access to Services     [facility] AH: Hadii bridger villedao Somega, waaxda luqadaha, qaybta kaalmada adeegyada, megan ravi . So [facility] [phone].    ATENCIÓN: Si habla español, tiene a dumont disposición servicios gratuitos de asistencia lingüística. Ll[facility] [phone].    We comply with applicable federal civil rights laws and [place] laws. We do not discriminate on the basis of race, color, national origin, age, disability, sex, sexual orientation, or gender identity.            Thank you!     Thank you for choosing [facility]  for your care. Our goal is always to provide you with excellent care. Hearing back from our patients is one way we can continue to improve our services. Please take a few minutes to complete the written survey that you may receive in the mail after your visit with us. Thank you!             Your Updated Medication List - Protect others around you: Learn how to safely use, store and throw away your medicines at www.disposemymeds.org.          This list is accurate as of  5/14/18  4:47 PM.  Always use your most recent med list.                   Brand Name Dispense Instructions for use Diagnosis    atenolol 50 MG tablet    TENORMIN    90 tablet    Take 1 tablet (50 mg) by mouth daily    Essential hypertension with goal blood pressure less than 140/90       levothyroxine 150 MCG tablet    SYNTHROID/LEVOTHROID    60 tablet    Take 1 tablet (150 mcg) by mouth daily Overdue for labs.    Hypothyroidism, unspecified type       lisinopril 20 MG tablet    PRINIVIL/ZESTRIL    90 tablet    Take 1 tablet (20 mg) by mouth daily    Essential hypertension with goal blood pressure less than 140/90       mometasone 50 MCG/ACT spray    NASONEX    1 Box    Spray 2 sprays into both nostrils daily    Chronic rhinitis, unspecified type       pantoprazole 40 MG EC tablet    PROTONIX    90 tablet    Take 1 tablet (40 mg) by mouth daily    Gastroesophageal reflux disease without esophagitis       sertraline 100 MG tablet    ZOLOFT    135 tablet    TAKE 1 & 1/2 TABLETS DAILY    Major depressive disorder, recurrent episode, mild (H)

## 2018-05-15 ASSESSMENT — PATIENT HEALTH QUESTIONNAIRE - PHQ9: SUM OF ALL RESPONSES TO PHQ QUESTIONS 1-9: 11

## 2018-05-15 ASSESSMENT — ANXIETY QUESTIONNAIRES: GAD7 TOTAL SCORE: 2

## 2018-05-17 ENCOUNTER — TELEPHONE (OUTPATIENT)
Dept: FAMILY MEDICINE | Facility: OTHER | Age: 58
End: 2018-05-17

## 2018-05-17 NOTE — TELEPHONE ENCOUNTER
----- Message from Era Bedolla MD sent at 5/17/2018  5:35 PM CDT -----  Labs show low thyroid but this is due to her being off of meds recently. We have restarted this during last office visit. She would need a repeat lab visit in 6-8 wks

## 2018-06-12 DIAGNOSIS — E03.9 HYPOTHYROIDISM, UNSPECIFIED TYPE: ICD-10-CM

## 2018-06-12 LAB — TSH SERPL DL<=0.005 MIU/L-ACNC: 0.53 MU/L (ref 0.4–4)

## 2018-06-12 PROCEDURE — 84443 ASSAY THYROID STIM HORMONE: CPT | Performed by: FAMILY MEDICINE

## 2018-06-12 PROCEDURE — 36415 COLL VENOUS BLD VENIPUNCTURE: CPT | Performed by: FAMILY MEDICINE

## 2018-06-14 DIAGNOSIS — K21.9 GASTROESOPHAGEAL REFLUX DISEASE WITHOUT ESOPHAGITIS: ICD-10-CM

## 2018-06-15 RX ORDER — PANTOPRAZOLE SODIUM 40 MG/1
TABLET, DELAYED RELEASE ORAL
Qty: 30 TABLET | Refills: 10 | Status: SHIPPED | OUTPATIENT
Start: 2018-06-15 | End: 2019-04-30

## 2018-06-15 NOTE — TELEPHONE ENCOUNTER
"Requested Prescriptions   Pending Prescriptions Disp Refills     pantoprazole (PROTONIX) 40 MG EC tablet [Pharmacy Med Name: Pantoprazole Sodium Oral Tablet Delayed Release 40 MG] 30 tablet 2     Sig: TAKE ONE TABLET BY MOUTH ONE TIME DAILY    PPI Protocol Passed    6/14/2018 10:40 AM       Passed - Not on Clopidogrel (unless Pantoprazole ordered)       Passed - No diagnosis of osteoporosis on record       Passed - Recent (12 mo) or future (30 days) visit within the authorizing provider's specialty    Patient had office visit in the last 12 months or has a visit in the next 30 days with authorizing provider or within the authorizing provider's specialty.  See \"Patient Info\" tab in inbasket, or \"Choose Columns\" in Meds & Orders section of the refill encounter.           Passed - Patient is age 18 or older       Passed - No active pregnacy on record       Passed - No positive pregnancy test in past 12 months        pantoprazole (PROTONIX) 40 MG EC tablet  Prescription approved per [facility] Refill Protocol.    Kymberly Bowen, RN, BSN     "

## 2018-06-19 ENCOUNTER — E-VISIT (OUTPATIENT)
Dept: FAMILY MEDICINE | Facility: OTHER | Age: 58
End: 2018-06-19
Payer: COMMERCIAL

## 2018-06-19 DIAGNOSIS — Z53.9 ERRONEOUS ENCOUNTER--DISREGARD: Primary | ICD-10-CM

## 2018-06-19 NOTE — MR AVS SNAPSHOT
After Visit Summary   6/19/2018    Micki Santos    MRN: [id number]           Patient Information     Date Of Birth          1960        Visit Information        Provider Department      6/19/2018 5:26 PM Era Bedolla MD [facility]        Today's Diagnoses     ERRONEOUS ENCOUNTER--DISREGARD    -  1       Follow-ups after your visit        Who to contact     If you have questions or need follow up information about today's clinic visit or your schedule please contact [facility] directly at [phone].  Normal or non-critical lab and imaging results will be communicated to you by [employer], letter or phone within 4 business days after the clinic has received the results. If you do not hear from us within 7 days, please contact the clinic through [employer] or phone. If you have a critical or abnormal lab result, we will notify you by phone as soon as possible.  Submit refill requests through [employer] or call your pharmacy and they will forward the refill request to us. Please allow 3 business days for your refill to be completed.          Additional Information About Your Visit        MyChart Information     [employer] gives you secure access to your electronic health record. If you see a primary care provider, you can also send messages to your care team and make appointments. If you have questions, please call your primary care clinic.  If you do not have a primary care provider, please call [phone] and they will assist you.        Care EveryWhere ID     This is your Care EveryWhere ID. This could be used by other organizations to access your [facility] medical records  [id number]        Your Vitals Were     Last Period                   07/29/2003            Blood Pressure from Last 3 Encounters:   05/14/18 136/84   06/23/17 124/80   06/13/17 136/79    Weight from Last 3 Encounters:   05/14/18 246 lb (111.6 kg)   06/23/17 243 lb (110.2 kg)   05/19/17 240  lb (108.9 kg)              Today, you had the following     No orders found for display       Primary Care Provider Office Phone # Fax #    Miranda WHITE MD [facility] [phone]       [address] [place] [address] [address]  [facility] [address]        Equal Access to Services     CAL HEIN : Hadii bridger daniels hadasho Soomaali, waaxda luqadaha, qaybta kaalmada adeegyada, waxbelkis castellon hayrobert chandleryeyoperico ravi . So [facility] [phone].    ATENCIÓN: Si habla español, tiene a dumont disposición servicios gratuitos de asistencia lingüística. Llame al [phone].    We comply with applicable federal civil rights laws and [place] laws. We do not discriminate on the basis of race, color, national origin, age, disability, sex, sexual orientation, or gender identity.            Thank you!     Thank you for choosing [facility]  for your care. Our goal is always to provide you with excellent care. Hearing back from our patients is one way we can continue to improve our services. Please take a few minutes to complete the written survey that you may receive in the mail after your visit with us. Thank you!             Your Updated Medication List - Protect others around you: Learn how to safely use, store and throw away your medicines at www.disposemymeds.org.          This list is accurate as of 6/19/18 11:59 PM.  Always use your most recent med list.                   Brand Name Dispense Instructions for use Diagnosis    atenolol 50 MG tablet    TENORMIN    90 tablet    Take 1 tablet (50 mg) by mouth daily    Essential hypertension with goal blood pressure less than 140/90       levothyroxine 150 MCG tablet    SYNTHROID/LEVOTHROID    60 tablet    Take 1 tablet (150 mcg) by mouth daily Overdue for labs.    Hypothyroidism, unspecified type       lisinopril 20 MG tablet    PRINIVIL/ZESTRIL    90 tablet    Take 1 tablet (20 mg) by mouth daily    Essential hypertension with goal blood pressure less than 140/90        mometasone 50 MCG/ACT spray    NASONEX    1 Box    Spray 2 sprays into both nostrils daily    Chronic rhinitis, unspecified type       pantoprazole 40 MG EC tablet    PROTONIX    30 tablet    TAKE ONE TABLET BY MOUTH ONE TIME DAILY    Gastroesophageal reflux disease without esophagitis       sertraline 100 MG tablet    ZOLOFT    135 tablet    TAKE 1 & 1/2 TABLETS DAILY    Major depressive disorder, recurrent episode, mild (H)

## 2018-06-20 NOTE — TELEPHONE ENCOUNTER
Please have patient schedule OV[facility]

## 2018-06-20 NOTE — TELEPHONE ENCOUNTER
Left detailed message informing client to return call to schedule OV. (thyroid)   Mary Lou Quiñonez MA  June 20, 2018

## 2018-07-17 DIAGNOSIS — E03.9 HYPOTHYROIDISM, UNSPECIFIED TYPE: ICD-10-CM

## 2018-07-19 RX ORDER — LEVOTHYROXINE SODIUM 150 UG/1
150 TABLET ORAL DAILY
Qty: 30 TABLET | Refills: 10 | Status: SHIPPED | OUTPATIENT
Start: 2018-07-19 | End: 2018-08-02

## 2018-07-19 NOTE — TELEPHONE ENCOUNTER
"Requested Prescriptions   Pending Prescriptions Disp Refills     levothyroxine (SYNTHROID/LEVOTHROID) 150 MCG tablet [Pharmacy Med Name: Levothyroxine Sodium Oral Tablet 150 MCG] 30 tablet 0     Sig: TAKE ONE TABLET BY MOUTH ONE TIME DAILY **overdue for labs**    Thyroid Protocol Passed    7/17/2018  2:38 PM       Passed - Patient is 12 years or older       Passed - Recent (12 mo) or future (30 days) visit within the authorizing provider's specialty    Patient had office visit in the last 12 months or has a visit in the next 30 days with authorizing provider or within the authorizing provider's specialty.  See \"Patient Info\" tab in inbasket, or \"Choose Columns\" in Meds & Orders section of the refill encounter.           Passed - Normal TSH on file in past 12 months    Recent Labs   Lab Test  06/12/18   0918   TSH  0.53             Passed - No active pregnancy on record    If patient is pregnant or has had a positive pregnancy test, please check TSH.         Passed - No positive pregnancy test in past 12 months    If patient is pregnant or has had a positive pregnancy test, please check TSH.          levothyroxine (SYNTHROID/LEVOTHROID) 150 MCG tablet  Prescription approved per [facility] Refill Protocol.    Kymberly Bowen, RN, BSN     "

## 2018-07-26 NOTE — PROGRESS NOTES
SUBJECTIVE:   Micki Santos is a 57 year old female who presents to clinic today for the following health issues:      HPI     Joint Pain    Onset: Months    Description:   Location: Knees, Hands, heels  Character: Dull ache and Cramping    Intensity: moderate, severe, 6/10    Progression of Symptoms: worse    Accompanying Signs & Symptoms:  Other symptoms: numbness, tingling, redness and burning    History:   Previous similar pain: YES      Precipitating factors:   Trauma or overuse: no     Alleviating factors:  Improved by: Ibuprofen    Therapies Tried and outcome: Ibuprofen- 3 tablets TID    Patient also concerned with fatigue that has been getting worse.  Hot stabbing pain along the right thigh and left shin. The fatigue is not getting better. I feel I run out of gas at night. Can take a nap in the afternoon. Hurts in the both hands, Both ankles, both knee and left hip.     Problem list and histories reviewed & adjusted, as indicated.  Additional history: as documented        Patient Active Problem List   Diagnosis     Esophageal reflux     Mild major depression (H)     Pain in thoracic spine     Hypertension, goal below 140/90     Family history of skin cancer     Multiple nevi     Hypothyroidism     Morbid obesity, unspecified obesity type (H)     Fatigue, unspecified type     Past Surgical History:   Procedure Laterality Date     C REMOVAL OF OVARY(S)      left ovary removed-cyst, uterine fibroid removed     CHOLECYSTECTOMY, LAPOROSCOPIC  07/24/06    Cholecystectomy, Laparoscopic     ESOPHAGOSCOPY, GASTROSCOPY, DUODENOSCOPY (EGD), COMBINED N/A 2/5/2016    Procedure: COMBINED ESOPHAGOSCOPY, GASTROSCOPY, DUODENOSCOPY (EGD), BIOPSY SINGLE OR MULTIPLE;  Surgeon: Del Betts MD;  Location:  GI     GENITOURINARY SURGERY  1988    removal of left ovary with cyst and uterine fibroid      REMOVAL OF TONSILS,12+ Y/O  1977    Tonsils 12+y.o.      UGI ENDOSCOPY, SIMPLE EXAM  02/11/09       Social  History   Substance Use Topics     Smoking status: Never Smoker     Smokeless tobacco: Never Used      Comment: No smokers in home     Alcohol use Yes      Comment: 1-2 glasses of wine/week     Family History   Problem Relation Age of Onset     C.A.D. Maternal Grandmother      DONYA. Maternal Grandfather      LAURAABOONE. Paternal Grandfather       age 38     Respiratory Mother      sarcoidosis     Hypertension Mother      chronic     Depression Mother      10+ years     Diabetes Father      Type II diabetes     Coronary Artery Disease Father      Heart Attack with Angioplasty/Stent     Arthritis No family hx of          Current Outpatient Prescriptions   Medication Sig Dispense Refill     atenolol (TENORMIN) 50 MG tablet Take 1 tablet (50 mg) by mouth daily 90 tablet 1     levothyroxine (SYNTHROID/LEVOTHROID) 137 MCG tablet Take 1 tablet (137 mcg) by mouth daily 90 tablet 0     lisinopril (PRINIVIL/ZESTRIL) 20 MG tablet Take 1 tablet (20 mg) by mouth daily 90 tablet 1     mometasone (NASONEX) 50 MCG/ACT spray Spray 2 sprays into both nostrils daily 1 Box 11     pantoprazole (PROTONIX) 40 MG EC tablet TAKE ONE TABLET BY MOUTH ONE TIME DAILY  30 tablet 10     sertraline (ZOLOFT) 100 MG tablet TAKE 1 & 1/2 TABLETS DAILY 135 tablet 1     [DISCONTINUED] levothyroxine (SYNTHROID/LEVOTHROID) 150 MCG tablet Take 1 tablet (150 mcg) by mouth daily 30 tablet 10     Allergies   Allergen Reactions     No Known Drug Allergies      Recent Labs   Lab Test  18   1111  18   0918  18   1648  10/11/16   1103   12/11/15   1627   09/14/10   0916   LDL   --    --    --   178*   --    --    --   132*   HDL   --    --    --   59   --    --    --   51   TRIG   --    --    --   200*   --    --    --   112   ALT  22   --   28   --    --   23   < >  19   CR  0.80   --   0.88  0.88   --   0.83   < >  0.94   GFRESTIMATED  73   --   66  66   --   71   < >  63   GFRESTBLACK  89   --   80  80   --   86   < >  76   POTASSIUM  4.5    "--   4.6  4.8   --   4.3   < >  4.9   TSH  0.17*  0.53  9.02*  4.80*   < >  5.19*   < >   --     < > = values in this interval not displayed.      BP Readings from Last 3 Encounters:   07/30/18 122/68   05/14/18 136/84   06/23/17 124/80    Wt Readings from Last 3 Encounters:   07/30/18 239 lb (108.4 kg)   05/14/18 246 lb (111.6 kg)   06/23/17 243 lb (110.2 kg)                  Labs reviewed in EPIC    ROS:  Constitutional, HEENT, cardiovascular, pulmonary, gi and gu systems are negative, except as otherwise noted.    OBJECTIVE:     /68 (BP Location: Left arm, Patient Position: Chair, Cuff Size: Adult Large)  Pulse 63  Temp 97  F (36.1  C) (Temporal)  Resp 18  Ht 5' 3.4\" (1.61 m)  Wt 239 lb (108.4 kg)  LMP 07/29/2003  SpO2 96%  BMI 41.8 kg/m2  Body mass index is 41.8 kg/(m^2).   Physical Exam   Constitutional: She is oriented to person, place, and time. She appears well-developed and well-nourished.   HENT:   Head: Normocephalic and atraumatic.   Right Ear: External ear normal.   Left Ear: External ear normal.   Nose: Nose normal.   Mouth/Throat: No oropharyngeal exudate.   Cardiovascular: Normal rate, regular rhythm, normal heart sounds and intact distal pulses.  Exam reveals no gallop.    No murmur heard.  Pulmonary/Chest: Effort normal and breath sounds normal.   Neurological: She is alert and oriented to person, place, and time.   Psychiatric: She has a normal mood and affect. Her behavior is normal. Judgment and thought content normal.   tearful         Diagnostic Test Results:  none     ASSESSMENT/PLAN:     Problem List Items Addressed This Visit     Hypothyroidism - Primary    Relevant Medications    levothyroxine (SYNTHROID/LEVOTHROID) 137 MCG tablet    Other Relevant Orders    TSH with free T4 reflex (Completed)    Urine Microscopic (Completed)    T4 free (Completed)    Fatigue, unspecified type     Pt with ongoing episodes of intermittent fatigue and muscle pain which seems to be going on for " yrs now. I reviewed the chart from her previous evaluation by rheumatology in 2014 and 2015 which was essentially negative.She does have hsitory of hypothyroidism which was uncontrolled but this was corrected with replacement. Her symptoms have not changed since restarting her thyroid medication. Will repeat labs to look for any changes. Offered sleep study to r/o sleep related disorders as contributing factors. Also sugggested physical therapy/manual therapy to see if this could be beneficial to help  her pain.         Relevant Orders    TSH with free T4 reflex (Completed)    CBC with platelets (Completed)    CRP, inflammation (Completed)    Comprehensive metabolic panel (BMP + Alb, Alk Phos, ALT, AST, Total. Bili, TP) (Completed)    *UA reflex to Microscopic (Completed)    SLEEP EVALUATION & MANAGEMENT REFERRAL - ADULT -[facility] [phone] (Age 13 if over 100 lbs)             Era Bedolla MD  [facility]

## 2018-07-30 ENCOUNTER — OFFICE VISIT (OUTPATIENT)
Dept: FAMILY MEDICINE | Facility: OTHER | Age: 58
End: 2018-07-30
Payer: COMMERCIAL

## 2018-07-30 VITALS
HEIGHT: 63 IN | SYSTOLIC BLOOD PRESSURE: 122 MMHG | DIASTOLIC BLOOD PRESSURE: 68 MMHG | BODY MASS INDEX: 42.35 KG/M2 | TEMPERATURE: 97 F | RESPIRATION RATE: 18 BRPM | OXYGEN SATURATION: 96 % | WEIGHT: 239 LBS | HEART RATE: 63 BPM

## 2018-07-30 DIAGNOSIS — E03.9 HYPOTHYROIDISM, UNSPECIFIED TYPE: Primary | ICD-10-CM

## 2018-07-30 DIAGNOSIS — R53.83 FATIGUE, UNSPECIFIED TYPE: ICD-10-CM

## 2018-07-30 LAB
ALBUMIN SERPL-MCNC: 4 G/DL (ref 3.4–5)
ALBUMIN UR-MCNC: NEGATIVE MG/DL
ALP SERPL-CCNC: 90 U/L (ref 40–150)
ALT SERPL W P-5'-P-CCNC: 22 U/L (ref 0–50)
ANION GAP SERPL CALCULATED.3IONS-SCNC: 10 MMOL/L (ref 3–14)
APPEARANCE UR: CLEAR
AST SERPL W P-5'-P-CCNC: 11 U/L (ref 0–45)
BACTERIA #/AREA URNS HPF: ABNORMAL /HPF
BILIRUB SERPL-MCNC: 0.3 MG/DL (ref 0.2–1.3)
BILIRUB UR QL STRIP: NEGATIVE
BUN SERPL-MCNC: 26 MG/DL (ref 7–30)
CALCIUM SERPL-MCNC: 9.3 MG/DL (ref 8.5–10.1)
CHLORIDE SERPL-SCNC: 109 MMOL/L (ref 94–109)
CO2 SERPL-SCNC: 22 MMOL/L (ref 20–32)
COLOR UR AUTO: YELLOW
CREAT SERPL-MCNC: 0.8 MG/DL (ref 0.52–1.04)
CRP SERPL-MCNC: 4.8 MG/L (ref 0–8)
ERYTHROCYTE [DISTWIDTH] IN BLOOD BY AUTOMATED COUNT: 14 % (ref 10–15)
GFR SERPL CREATININE-BSD FRML MDRD: 73 ML/MIN/1.7M2
GLUCOSE SERPL-MCNC: 88 MG/DL (ref 70–99)
GLUCOSE UR STRIP-MCNC: NEGATIVE MG/DL
HCT VFR BLD AUTO: 40.6 % (ref 35–47)
HGB BLD-MCNC: 13.6 G/DL (ref 11.7–15.7)
HGB UR QL STRIP: NEGATIVE
KETONES UR STRIP-MCNC: NEGATIVE MG/DL
LEUKOCYTE ESTERASE UR QL STRIP: ABNORMAL
MCH RBC QN AUTO: 30.5 PG (ref 26.5–33)
MCHC RBC AUTO-ENTMCNC: 33.5 G/DL (ref 31.5–36.5)
MCV RBC AUTO: 91 FL (ref 78–100)
NITRATE UR QL: NEGATIVE
NON-SQ EPI CELLS #/AREA URNS LPF: ABNORMAL /LPF
PH UR STRIP: 5.5 PH (ref 5–7)
PLATELET # BLD AUTO: 344 10E9/L (ref 150–450)
POTASSIUM SERPL-SCNC: 4.5 MMOL/L (ref 3.4–5.3)
PROT SERPL-MCNC: 7.9 G/DL (ref 6.8–8.8)
RBC # BLD AUTO: 4.46 10E12/L (ref 3.8–5.2)
RBC #/AREA URNS AUTO: ABNORMAL /HPF
SODIUM SERPL-SCNC: 141 MMOL/L (ref 133–144)
SOURCE: ABNORMAL
SP GR UR STRIP: 1.02 (ref 1–1.03)
T4 FREE SERPL-MCNC: 1.14 NG/DL (ref 0.76–1.46)
TSH SERPL DL<=0.005 MIU/L-ACNC: 0.17 MU/L (ref 0.4–4)
UROBILINOGEN UR STRIP-ACNC: 0.2 EU/DL (ref 0.2–1)
WBC # BLD AUTO: 10.7 10E9/L (ref 4–11)
WBC #/AREA URNS AUTO: ABNORMAL /HPF

## 2018-07-30 PROCEDURE — 36415 COLL VENOUS BLD VENIPUNCTURE: CPT | Performed by: FAMILY MEDICINE

## 2018-07-30 PROCEDURE — 80053 COMPREHEN METABOLIC PANEL: CPT | Performed by: FAMILY MEDICINE

## 2018-07-30 PROCEDURE — 84439 ASSAY OF FREE THYROXINE: CPT | Performed by: FAMILY MEDICINE

## 2018-07-30 PROCEDURE — 99214 OFFICE O/P EST MOD 30 MIN: CPT | Performed by: FAMILY MEDICINE

## 2018-07-30 PROCEDURE — 81001 URINALYSIS AUTO W/SCOPE: CPT | Performed by: FAMILY MEDICINE

## 2018-07-30 PROCEDURE — 84443 ASSAY THYROID STIM HORMONE: CPT | Performed by: FAMILY MEDICINE

## 2018-07-30 PROCEDURE — 86140 C-REACTIVE PROTEIN: CPT | Performed by: FAMILY MEDICINE

## 2018-07-30 PROCEDURE — 85027 COMPLETE CBC AUTOMATED: CPT | Performed by: FAMILY MEDICINE

## 2018-07-30 ASSESSMENT — PATIENT HEALTH QUESTIONNAIRE - PHQ9: 5. POOR APPETITE OR OVEREATING: NOT AT ALL

## 2018-07-30 ASSESSMENT — ANXIETY QUESTIONNAIRES
5. BEING SO RESTLESS THAT IT IS HARD TO SIT STILL: NOT AT ALL
GAD7 TOTAL SCORE: 2
2. NOT BEING ABLE TO STOP OR CONTROL WORRYING: NOT AT ALL
3. WORRYING TOO MUCH ABOUT DIFFERENT THINGS: NOT AT ALL
7. FEELING AFRAID AS IF SOMETHING AWFUL MIGHT HAPPEN: NOT AT ALL
1. FEELING NERVOUS, ANXIOUS, OR ON EDGE: SEVERAL DAYS
6. BECOMING EASILY ANNOYED OR IRRITABLE: SEVERAL DAYS
IF YOU CHECKED OFF ANY PROBLEMS ON THIS QUESTIONNAIRE, HOW DIFFICULT HAVE THESE PROBLEMS MADE IT FOR YOU TO DO YOUR WORK, TAKE CARE OF THINGS AT HOME, OR GET ALONG WITH OTHER PEOPLE: VERY DIFFICULT

## 2018-07-30 ASSESSMENT — PAIN SCALES - GENERAL: PAINLEVEL: SEVERE PAIN (6)

## 2018-07-30 NOTE — MR AVS SNAPSHOT
After Visit Summary   7/30/2018    Micki Santos    MRN: [id number]           Patient Information     Date Of Birth          1960        Visit Information        Provider Department      7/30/2018 10:40 AM Era Bedolla MD [facility]        Today's Diagnoses     Hypothyroidism, unspecified type    -  1    Fatigue, unspecified type           Follow-ups after your visit        Additional Services     SLEEP EVALUATION & MANAGEMENT REFERRAL - [facility] [phone] (Age 13 if over 100 lbs)       Please be aware that coverage of these services is subject to the terms and limitations of your health insurance plan.  Call member services at your health plan with any benefit or coverage questions.      Please bring the following to your appointment:    >>   List of current medications   >>   This referral request   >>   Any documents/labs given to you for this referral                      Follow-up notes from your care team     Return in about 6 weeks (around 9/10/2018).      Future tests that were ordered for you today     Open Future Orders        Priority Expected Expires Ordered    SLEEP EVALUATION & MANAGEMENT REFERRAL - [facility] [phone] (Age 13 if over 100 lbs) Routine  7/30/2019 7/30/2018            Who to contact     If you have questions or need follow up information about today's clinic visit or your schedule please contact [facility] directly at [phone].  Normal or non-critical lab and imaging results will be communicated to you by MyChart, letter or phone within 4 business days after the clinic has received the results. If you do not hear from us within 7 days, please contact the clinic through MyChart or phone. If you have a critical or abnormal lab result, we will notify you by phone as soon as possible.  Submit refill requests through [employer] or call your pharmacy and they  "will forward the refill request to us. Please allow 3 business days for your refill to be completed.          Additional Information About Your Visit        [employer] Information     [employer] gives you secure access to your electronic health record. If you see a primary care provider, you can also send messages to your care team and make appointments. If you have questions, please call your primary care clinic.  If you do not have a primary care provider, please call [phone] and they will assist you.        Care EveryWhere ID     This is your Care EveryWhere ID. This could be used by other organizations to access your [facility] medical records  [id number]        Your Vitals Were     Pulse Temperature Respirations Height Last Period Pulse Oximetry    63 97  F (36.1  C) (Temporal) 18 5' 3.4\" (1.61 m) 07/29/2003 96%    BMI (Body Mass Index)                   41.8 kg/m2            Blood Pressure from Last 3 Encounters:   07/30/18 122/68   05/14/18 136/84   06/23/17 124/80    Weight from Last 3 Encounters:   07/30/18 239 lb (108.4 kg)   05/14/18 246 lb (111.6 kg)   06/23/17 243 lb (110.2 kg)              We Performed the Following     *UA reflex to Microscopic     CBC with platelets     Comprehensive metabolic panel (BMP + Alb, Alk Phos, ALT, AST, Total. Bili, TP)     CRP, inflammation     TSH with free T4 reflex        Primary Care Provider Office Phone # Fax #    Miranda WHITE MD [facility] [phone]       [address] [facility] [address]  [facility] [address]        Equal Access to Services     [facility] HospitalCHRISTOPHE : Hadii aad ku hadasho Soomaali, waaxda luqadaha, qaybta kaalmada adeegyabatool, megan ravi . So [facility] [phone].    ATENCIÓN: Si habla español, tiene a dumont disposición servicios gratuitos de asistencia lingüística. Llame al [phone].    We comply with applicable federal civil rights laws and [place] laws. We do not discriminate on the basis of race, color, national origin, age, " disability, sex, sexual orientation, or gender identity.            Thank you!     Thank you for choosing [facility]  for your care. Our goal is always to provide you with excellent care. Hearing back from our patients is one way we can continue to improve our services. Please take a few minutes to complete the written survey that you may receive in the mail after your visit with us. Thank you!             Your Updated Medication List - Protect others around you: Learn how to safely use, store and throw away your medicines at www.disposemymeds.org.          This list is accurate as of 7/30/18 11:12 AM.  Always use your most recent med list.                   Brand Name Dispense Instructions for use Diagnosis    atenolol 50 MG tablet    TENORMIN    90 tablet    Take 1 tablet (50 mg) by mouth daily    Essential hypertension with goal blood pressure less than 140/90       levothyroxine 150 MCG tablet    SYNTHROID/LEVOTHROID    30 tablet    Take 1 tablet (150 mcg) by mouth daily    Hypothyroidism, unspecified type       lisinopril 20 MG tablet    PRINIVIL/ZESTRIL    90 tablet    Take 1 tablet (20 mg) by mouth daily    Essential hypertension with goal blood pressure less than 140/90       mometasone 50 MCG/ACT spray    NASONEX    1 Box    Spray 2 sprays into both nostrils daily    Chronic rhinitis, unspecified type       pantoprazole 40 MG EC tablet    PROTONIX    30 tablet    TAKE ONE TABLET BY MOUTH ONE TIME DAILY    Gastroesophageal reflux disease without esophagitis       sertraline 100 MG tablet    ZOLOFT    135 tablet    TAKE 1 & 1/2 TABLETS DAILY    Major depressive disorder, recurrent episode, mild (H)

## 2018-07-31 ASSESSMENT — ANXIETY QUESTIONNAIRES: GAD7 TOTAL SCORE: 2

## 2018-07-31 ASSESSMENT — PATIENT HEALTH QUESTIONNAIRE - PHQ9: SUM OF ALL RESPONSES TO PHQ QUESTIONS 1-9: 9

## 2018-08-01 PROBLEM — R53.83 FATIGUE, UNSPECIFIED TYPE: Status: ACTIVE | Noted: 2018-08-01

## 2018-08-02 ENCOUNTER — TELEPHONE (OUTPATIENT)
Dept: FAMILY MEDICINE | Facility: OTHER | Age: 58
End: 2018-08-02

## 2018-08-02 RX ORDER — LEVOTHYROXINE SODIUM 137 UG/1
137 TABLET ORAL DAILY
Qty: 90 TABLET | Refills: 0 | Status: SHIPPED | OUTPATIENT
Start: 2018-08-02 | End: 2018-12-19

## 2018-08-02 NOTE — TELEPHONE ENCOUNTER
----- Message from Era Bedolla MD sent at 8/2/2018  4:16 PM CDT -----  Please inform patient that all her labs came back essentially normal except for thyroid test which shows signs of excessive supplementation.  I reduce the dose of her thyroid medication.  Please have her take the new dose and have her thyroid rechecked in 2 months.

## 2018-08-02 NOTE — ASSESSMENT & PLAN NOTE
Pt with ongoing episodes of intermittent fatigue and muscle pain which seems to be going on for yrs now. I reviewed the chart from her previous evaluation by rheumatology in 2014 and 2015 which was essentially negative.She does have hsitory of hypothyroidism which was uncontrolled but this was corrected with replacement. Her symptoms have not changed since restarting her thyroid medication. Will repeat labs to look for any changes. Offered sleep study to r/o sleep related disorders as contributing factors. Also sugggested physical therapy/manual therapy to see if this could be beneficial to help  her pain.

## 2018-08-07 ENCOUNTER — TELEPHONE (OUTPATIENT)
Dept: FAMILY MEDICINE | Facility: OTHER | Age: 58
End: 2018-08-07

## 2018-08-07 DIAGNOSIS — Z80.0 FAMILY HISTORY OF COLON CANCER: Primary | ICD-10-CM

## 2018-08-07 NOTE — TELEPHONE ENCOUNTER
This was sent through my chart for a appt, can you please put a order in?  Would like to schedule colonscopy for this upcoming month.  With my mother's colon cancer last year, I think it is prudent to have this procedure done

## 2018-08-10 ENCOUNTER — TRANSFERRED RECORDS (OUTPATIENT)
Dept: HEALTH INFORMATION MANAGEMENT | Facility: CLINIC | Age: 58
End: 2018-08-10

## 2018-08-10 ENCOUNTER — HEALTH MAINTENANCE LETTER (OUTPATIENT)
Age: 58
End: 2018-08-10

## 2018-08-10 LAB — PAP SMEAR - HIM PATIENT REPORTED: NORMAL

## 2018-08-13 ENCOUNTER — TELEPHONE (OUTPATIENT)
Dept: FAMILY MEDICINE | Facility: OTHER | Age: 58
End: 2018-08-13

## 2018-08-13 ENCOUNTER — OFFICE VISIT (OUTPATIENT)
Dept: SLEEP MEDICINE | Facility: CLINIC | Age: 58
End: 2018-08-13
Payer: COMMERCIAL

## 2018-08-13 VITALS
TEMPERATURE: 97.6 F | OXYGEN SATURATION: 96 % | DIASTOLIC BLOOD PRESSURE: 82 MMHG | HEART RATE: 58 BPM | SYSTOLIC BLOOD PRESSURE: 132 MMHG | WEIGHT: 240 LBS | HEIGHT: 64 IN | RESPIRATION RATE: 16 BRPM | BODY MASS INDEX: 40.97 KG/M2

## 2018-08-13 DIAGNOSIS — R06.83 SNORING: ICD-10-CM

## 2018-08-13 DIAGNOSIS — R53.83 FATIGUE, UNSPECIFIED TYPE: Primary | ICD-10-CM

## 2018-08-13 DIAGNOSIS — I10 HYPERTENSION, GOAL BELOW 140/90: ICD-10-CM

## 2018-08-13 PROCEDURE — 99244 OFF/OP CNSLTJ NEW/EST MOD 40: CPT | Performed by: PHYSICIAN ASSISTANT

## 2018-08-13 NOTE — PATIENT INSTRUCTIONS
"MY TREATMENT INFORMATION FOR SLEEP APNEA-  Micki Santos    DOCTOR : Bennett Ezra Goltz, PA-C  SLEEP CENTER :  [facility]     MY CONTACT NUMBER: [phone]    Am I having a sleep study at a sleep center?  Make sure you have an appointment for the study before you leave!    Am I having a home sleep study?  Watch this video:  https://www.Foodlve.com/watch?v=CteI_GhyP9g&list=[email or web address]  Please verify your insurance coverage with your insurance carrier    Frequently asked questions:  1. What is Obstructive Sleep Apnea (LEROY)? LEROY is the most common type of sleep apnea. Apnea means, \"without breath.\"  Apnea is most often caused by narrowing or collapse of the upper airway as muscles relax during sleep.   Almost everyone has occasional apneas. Most people with sleep apnea have had brief interruptions at night frequently for many years.  The severity of sleep apnea is related to how frequent and severe the events are.   2. What are the consequences of LEROY? Symptoms include: feeling sleepy during the day, snoring loudly, gasping or stopping of breathing, trouble sleeping, and occasionally morning headaches or heartburn at night.  Sleepiness can be serious and even increase the risk of falling asleep while driving. Other health consequences may include development of high blood pressure and other cardiovascular disease in persons who are susceptible. Untreated LEROY  can contribute to heart disease, stroke and diabetes.   3. What are the treatment options? In most situations, sleep apnea is a lifelong disease that must be managed with daily therapy. Medications are not effective for sleep apnea and surgery is generally not considered until other therapies have been tried. Your treatment is your choice . Continuous Positive Airway (CPAP) works right away and is the therapy that is effective in nearly everyone. An oral device to hold your jaw forward is usually the next most reliable option. Other " options include postioning devices (to keep you off your back), weight loss, and surgery including a tongue pacing device. There is more detail about some of these options below.    Important tips for using CPAP and similar devices   Know your equipment:  CPAP is continuous positive airway pressure that prevents obstructive sleep apnea by keeping the throat from collapsing while you are sleeping. In most cases, the device is  smart  and can slowly self-adjusts if your throat collapses and keeps a record every day of how well you are treated-this information is available to you and your care team.  BPAP is bilevel positive airway pressure that keeps your throat open and also assists each breath with a pressure boost to maintain adequate breathing.  Special kinds of BPAP are used in patients who have inadequate breathing from lung or heart disease. In most cases, the device is  smart  and can slowly self-adjusts to assist breathing. Like CPAP, the device keeps a record of how well you are treated.  Your mask is your connection to the device. You get to choose what feels most comfortable and the staff will help to make sure if fits. Here: are some examples of the different masks that are available:       Key points to remember on your journey with sleep apnea:  1. Sleep study.  PAP devices often need to be adjusted during a sleep study to show that they are effective and adjusted right.  2. Good tips to remember: Try wearing just the mask during a quiet time during the day so your body adapts to wearing it. A humidifier is recommended for comfort in most cases to prevent drying of your nose and throat. Allergy medication from your provider may help you if you are having nasal congestion.  3. Getting settled-in. It takes more than one night for most of us to get used to wearing a mask. Try wearing just the mask during a quiet time during the day so your body adapts to wearing it. A humidifier is recommended for comfort  in most cases. Our team will work with you carefully on the first day and will be in contact within 4 days and again at 2 and 4 weeks for advice and remote device adjustments. Your therapy is evaluated by the device each day.   4. Use it every night. The more you are able to sleep naturally for 7-8 hours, the more likely you will have good sleep and to prevent health risks or symptoms from sleep apnea. Even if you use it 4 hours it helps. Occasionally all of us are unable to use a medical therapy, in sleep apnea, it is not dangerous to miss one night.   5. Communicate. Call our skilled team on the number provided on the first day if your visit for problems that make it difficult to wear the device. Over 2 out of 3 patients can learn to wear the device long-term with help from our team. Remember to call our team or your sleep providers if you are unable to wear the device as we may have other solutions for those who cannot adapt to mask CPAP therapy. It is recommended that you sleep your sleep provider within the first 3 months and yearly after that if you are not having problems.   Take care of your equipment. Make sure you clean your mask and tubing using directions every day and that your filter and mask are replaced as recommended or if they are not working.     BESIDES CPAP, WHAT OTHER THERAPIES ARE THERE?    Positioning Device  Positioning devices are generally used when sleep apnea is mild and only occurs on your back.This example shows a pillow that straps around the waist. It may be appropriate for those whose sleep study shows milder sleep apnea that occurs primarily when lying flat on one's back. Preliminary studies have shown benefit but effectiveness at home may need to be verified by a home sleep test. These devices are generally not covered by medical insurance.  Examples of devices that maintain sleeping on the back to prevent snoring and mild sleep apnea.    Belt type body  positioner  Http://[employer].Beneq/    Electronic reminder  Http://nightshifttherapy.com/  Http://www.Medivance.com.au/    Oral Appliance  What is oral appliance therapy?  An oral appliance device fits on your teeth at night like a retainer used after having braces. The device is made by a specialized dentist and requires several visits over 1-2 months before a manufactured device is made to fit your teeth and is adjusted to prevent your sleep apnea. Once an oral device is working properly, snoring should be improved. A home sleep test may be recommended at that time if to determine whether the sleep apnea is adequately treated.       Some things to remember:  -Oral devices are often, but not always, covered by your medical insurance. Be sure to check with your insurance provider.   -If you are referred for oral therapy, you will be given a list of specialized dentists to consider or you may choose to visit the Web site of the American Academy of Dental Sleep Medicine  -Oral devices are less likely to work if you have severe sleep apnea or are extremely overweight.     More detailed information  An oral appliance is a small acrylic device that fits over the upper and lower teeth  (similar to a retainer or a mouth guard). This device slightly moves jaw forward, which moves the base of the tongue forward, opens the airway, improves breathing for effective treat snoring and obstructive sleep apnea in perhaps 7 out of 10 people .  The best working devices are custom-made by a dental device  after a mold is made of the teeth 1, 2, 3.  When is an oral appliance indicated?  Oral appliance therapy is recommended as a first-line treatment for patients with primary snoring, mild sleep apnea, and for patients with moderate sleep apnea who prefer appliance therapy to use of CPAP4, 5. Severity of sleep apnea is determined by sleep testing and is based on the number of respiratory events per hour of sleep.   How successful  is oral appliance therapy?  The success rate of oral appliance therapy in patients with mild sleep apnea is 75-80% while in patients with moderate sleep apnea it is 50-70%. The chance of success in patients with severe sleep apnea is 40-50%. The research also shows that oral appliances have a beneficial effect on the cardiovascular health of LEROY patients at the same magnitude as CPAP therapy7.  Oral appliances should be a second-line treatment in cases of severe sleep apnea, but if not completely successful then a combination therapy utilizing CPAP plus oral appliance therapy may be effective. Oral appliances tend to be effective in a broad range of patients although studies show that the patients who have the highest success are females, younger patients, those with milder disease, and less severe obesity. 3, 6.   Finding a dentist that practices dental sleep medicine  Specific training is available through the American Academy of Dental Sleep Medicine for dentists interested in working in the field of sleep. To find a dentist who is educated in the field of sleep and the use of oral appliances, near you, visit the Web site of the American Academy of Dental Sleep Medicine.    References  1. Manuelito et al. Objectively measured vs self-reported compliance during oral appliance therapy for sleep-disordered breathing. Chest 2013; 144(5): 0867-2134.  2. Nazia et al. Objective measurement of compliance during oral appliance therapy for sleep-disordered breathing. Thorax 2013; 68(1): 91-96.  3. Blessing, et al. Mandibular advancement devices in 620 men and women with LEROY and snoring: tolerability and predictors of treatment success. Chest 2004; 125: 9719-9285.  4. Monse, et al. Oral appliances for snoring and LEROY: a review. Sleep 2006; 29: 244-262.  5. Sheldon, et al. Oral appliance treatment for LEROY: an update. J Clin Sleep Med 2014; 10(2): 215-227.  6. Gillian et al. Predictors of OSAH treatment  outcome. J Casey Res 2007; 86: 0288-4378.    Weight Loss:    Weight loss is a long-term strategy that may improve sleep apnea in some patients.    Weight management is a personal decision and the decision should be based on your interest and the potential benefits.  If you are interested in exploring weight loss strategies, the following discussion covers the impact on weight loss on sleep apnea and the approaches that may be successful.    Being overweight does not necessarily mean you will have health consequences.  Those who have BMI over 35 or over 27 with existing medical conditions carries greater risk.   Weight loss decreases severity of sleep apnea in most people with obesity. For those with mild obesity who have developed snoring with weight gain, even 15-30 pound weight loss can improve and occasionally eliminate sleep apnea.  Structured and life-long dietary and health habits are necessary to lose weight and keep healthier weight levels.     Though there may be significant health benefits from weight loss, long-term weight loss is very difficult to achieve- studies show success with dietary management in less than 10% of people. In addition, substantial weight loss may require years of dietary control and may be difficult if patients have severe obesity. In these cases, surgical management may be considered.  Finally, older individuals who have tolerated obesity without health complications may be less likely to benefit from weight loss strategies.      Your BMI is Body mass index is 41.77 kg/(m^2).  Weight management is a personal decision.  If you are interested in exploring weight loss strategies, the following discussion covers the approaches that may be successful. Body mass index (BMI) is one way to tell whether you are at a healthy weight, overweight, or obese. It measures your weight in relation to your height.  A BMI of 18.5 to 24.9 is in the healthy range. A person with a BMI of 25 to 29.9 is  considered overweight, and someone with a BMI of 30 or greater is considered obese. More than two-thirds of American adults are considered overweight or obese.  Being overweight or obese increases the risk for further weight gain. Excess weight may lead to heart disease and diabetes.  Creating and following plans for healthy eating and physical activity may help you improve your health.  Weight control is part of healthy lifestyle and includes exercise, emotional health, and healthy eating habits. Careful eating habits lifelong are the mainstay of weight control. Though there are significant health benefits from weight loss, long-term weight loss with diet alone may be very difficult to achieve- studies show long-term success with dietary management in less than 10% of people. Attaining a healthy weight may be especially difficult to achieve in those with severe obesity. In some cases, medications, devices and surgical management might be considered.  What can you do?  If you are overweight or obese and are interested in methods for weight loss, you should discuss this with your provider.     Consider reducing daily calorie intake by 500 calories.     Keep a food journal.     Avoiding skipping meals, consider cutting portions instead.    Diet combined with exercise helps maintain muscle while optimizing fat loss. Strength training is particularly important for building and maintaining muscle mass. Exercise helps reduce stress, increase energy, and improves fitness. Increasing exercise without diet control, however, may not burn enough calories to loose weight.       Start walking three days a week 10-20 minutes at a time    Work towards walking thirty minutes five days a week     Eventually, increase the speed of your walking for 1-2 minutes at time    In addition, we recommend that you review healthy lifestyles and methods for weight loss available through the National Institutes of Health patient information  sites:  http://win.niddk.nih.gov/publications/index.htm    And look into health and wellness programs that may be available through your health insurance provider, employer, local community center, or hyun club.    Weight management plan: Patient was referred to their PCP to discuss a diet and exercise plan.    Surgery:    Surgery for obstructive sleep apnea is considered generally only when other therapies fail to work. Surgery may be discussed with you if you are having a difficult time tolerating CPAP and or when there is an abnormal structure that requires surgical correction.  Nose and throat surgeries often enlarge the airway to prevent collapse.  Most of these surgeries create pain for 1-2 weeks and up to half of the most common surgeries are not effective throughout life.  You should carefully discuss the benefits and drawbacks to surgery with your sleep provider and surgeon to determine if it is the best solution for you.   More information  Surgery for LEROY is directed at areas that are responsible for narrowing or complete obstruction of the airway during sleep.  There are a wide range of procedures available to enlarge and/or stabilize the airway to prevent blockage of breathing in the three major areas where it can occur: the palate, tongue, and nasal regions.  Successful surgical treatment depends on the accurate identification of the factors responsible for obstructive sleep apnea in each person.  A personalized approach is required because there is no single treatment that works well for everyone.  Because of anatomic variation, consultation with an examination by a sleep surgeon is a critical first step in determining what surgical options are best for each patient.  In some cases, examination during sedation may be recommended in order to guide the selection of procedures.  Patients will be counseled about risks and benefits as well as the typical recovery course after surgery. Surgery is typically  not a cure for a person s LEROY.  However, surgery will often significantly improve one s LEROY severity (termed  success rate ).  Even in the absence of a cure, surgery will decrease the cardiovascular risk associated with OSA7; improve overall quality of life8 (sleepiness, functionality, sleep quality, etc).  Palate Procedures:  Patients with LEROY often have narrowing of their airway in the region of their tonsils and uvula.  The goals of palate procedures are to widen the airway in this region as well as to help the tissues resist collapse.  Modern palate procedure techniques focus on tissue conservation and soft tissue rearrangement, rather than tissue removal.  Often the uvula is preserved in this procedure. Residual sleep apnea is common in patient after pharyngoplasty with an average reduction in sleep apnea events of 33%2.    Tongue Procedures:  ExamWhile patients are awake, the muscles that surround the throat are active and keep this region open for breathing. These muscles relax during sleep, allowing the tongue and other structures to collapse and block breathing.  There are several different tongue procedures available.  Selection of a tongue base procedure depends on characteristics seen on physical exam.  Generally, procedures are aimed at removing bulky tissues in this area or preventing the back of the tongue from falling back during sleep.  Success rates for tongue surgery range from 50-62%3.  Hypoglossal Nerve Stimulation:  Hypoglossal nerve stimulation has recently received approval from the [place] Food and Drug Administration for the treatment of obstructive sleep apnea.  This is based on research showing that the system was safe and effective in treating sleep apnea6.  Results showed that the median AHI score decreased 68%, from 29.3 to 9.0. This therapy uses an implant system that senses breathing patterns and delivers mild stimulation to airway muscles, which keeps the airway open during  sleep.  The system consists of three fully implanted components: a small generator (similar in size to a pacemaker), a breathing sensor, and a stimulation lead.  Using a small handheld remote, a patient turns the therapy on before bed and off upon awakening.    Candidates for this device must be greater than 22 years of age, have moderate to severe LEROY (AHI between 20-65), BMI less than 32, have tried CPAP/oral appliance without success, and have appropriate upper airway anatomy (determined by a sleep endoscopy performed by Dr. Johnson).  Hypoglossal Nerve Stimulation Pathway:    The sleep surgeon s office will work with the patient through the insurance prior-authorization process (including communications and appeals).    Nasal Procedures:  Nasal obstruction can interfere with nasal breathing during the day and night.  Studies have shown that relief of nasal obstruction can improve the ability of some patients to tolerate positive airway pressure therapy for obstructive sleep apnea1.  Treatment options include medications such as nasal saline, topical corticosteroid and antihistamine sprays, and oral medications such as antihistamines or decongestants. Non-surgical treatments can include external nasal dilators for selected patients. If these are not successful by themselves, surgery can improve the nasal airway either alone or in combination with these other options.  Combination Procedures:  Combination of surgical procedures and other treatments may be recommended, particularly if patients have more than one area of narrowing or persistent positional disease.  The success rate of combination surgery ranges from 66-80%2,3.    References  1. Jessica KAY. The Role of the Nose in Snoring and Obstructive Sleep Apnoea: An Update.  Eur Arch Otorhinolaryngol. 2011; 268: 1365-73.  2.  Jil SM; Chapo JA; Enedelia JR; Pallanch JF; Sky PARRA; Minal TANG; Sharon BENNETT. Surgical modifications of the upper airway for obstructive  sleep apnea in adults: a systematic review and meta-analysis. SLEEP 2010;33(10):5762-7393. Raquel MICHAELS. Hypopharyngeal surgery in obstructive sleep apnea: an evidence-based medicine review.  Arch Otolaryngol Head Neck Surg. 2006 Feb;132(2):206-13.  3. Yogi YH1, Ronnell Y, Pavel SHUKRI. The efficacy of anatomically based multilevel surgery for obstructive sleep apnea. Otolaryngol Head Neck Surg. 2003 Oct;129(4):327-35.  4. Kezirian E, Goldberg A. Hypopharyngeal Surgery in Obstructive Sleep Apnea: An Evidence-Based Medicine Review. Arch Otolaryngol Head Neck Surg. 2006 Feb;132(2):206-13.  5. Anderson COSME et al. Upper-Airway Stimulation for Obstructive Sleep Apnea.  N Engl J Med. 2014 Jan 9;370(2):139-49.  6. Curly Y et al. Increased Incidence of Cardiovascular Disease in Middle-aged Men with Obstructive Sleep Apnea. Am J Respir Crit Care Med; 2002 166: 159-165  7. Ibis VAZQUEZ et al. Studying Life Effects and Effectiveness of Palatopharyngoplasty (SLEEP) study: Subjective Outcomes of Isolated Uvulopalatopharyngoplasty. Otolaryngol Head Neck Surg. 2011; 144: 623-631.

## 2018-08-13 NOTE — TELEPHONE ENCOUNTER
Left message for patient to return call to schedule colonoscopy or EGD. If Yusra or Mikki are unavailable, please transfer to the surgery center.

## 2018-08-13 NOTE — PROGRESS NOTES
Sleep Consultation:    Date on this visit: 8/13/2018    Micki Santos is sent by [facility] Bedolla for a sleep consultation regarding fatigue.    Primary Physician: Miranda Bosch     Micki Santos reports fatigue for a couple of years. Her medical history is significant for morbid obesity, GERD, hypothyroidism, HTN, depression, joint pain.    Micki goes to sleep at 10:00-10:30 PM during the week. She wakes up at 7:00-7:30 AM without an alarm. She feels she could sleep 12 hours daily and still nap if she did not have work. She falls asleep in 5 minutes.  Micki denies difficulty falling asleep.  She wakes up 1 time a night for 5 minutes before falling back to sleep.  Micki wakes up to go to the bathroom.  On weekends, her sleep is the same. Sometimes, she may sleep until 9-9:30 AM.  Patient gets an average of 9-9.5 hours of sleep per week night and 11 hours on weekends.     Patient does not use electronics in bed, watch TV in bed, worry in bed about anything and read in bed. She uses a little fan for white noise.    Micki does not do shift work.  She works day shifts.  She is an  at [employer]. She does travel for work a lot, around the [place]. She lives with her .    Micki does snore every night and snoring is loud, heavy and labored. Patient does have a regular bed partner. There is report of snorting.  She does not have witnessed apneas. They never sleep separately.  Patient sleeps on her back, stomach and side (mostly). She does sleep with the head of bed slightly elevated for reflux.  She has occasional snort arousals, morning dry mouth, morning headaches. She denies restless legs or morning confusion. She gets bad leg muscle cramps 1-2 times per week. Her magnesium was 2.3 recently and ferritin 24. Micki has occasional bruxism, sleep talking and sleep paralysis and denies any sleep walking, dream enactment, cataplexy and hypnogogic/hypnopompic hallucinations.    Micki has  difficulty breathing through her nose, reflux at night, heartburn and depression, denies claustrophobia.  She is using Nasonex for allergies. She is on sertraline for depression.    Micki has gained 10 pounds in 4 years.  Patient describes themself as neither a morning or night person.  She would prefer to go to sleep at 10:00 PM and wake up at 9:00-10:00 AM.  Patient's Ewell Sleepiness score 5/24 inconsistent with daytime sleepiness.      Micki naps 1-2 times per week for 60-90 minutes, feels refreshed after naps. She would like to nap every day, but does not have the opportunity. She takes rare inadvertant naps.  She denies dozing while driving. She will doze as a passenger.  Patient was counseled on the importance of driving while alert, to pull over if drowsy, or nap before getting into the vehicle if sleepy.  She uses 1-2 sodas/day. Last caffeine intake is usually before 3-4 PM.    Allergies:    Allergies   Allergen Reactions     No Known Drug Allergies        Medications:    Current Outpatient Prescriptions   Medication Sig Dispense Refill     atenolol (TENORMIN) 50 MG tablet Take 1 tablet (50 mg) by mouth daily 90 tablet 1     levothyroxine (SYNTHROID/LEVOTHROID) 137 MCG tablet Take 1 tablet (137 mcg) by mouth daily 90 tablet 0     lisinopril (PRINIVIL/ZESTRIL) 20 MG tablet Take 1 tablet (20 mg) by mouth daily 90 tablet 1     mometasone (NASONEX) 50 MCG/ACT spray Spray 2 sprays into both nostrils daily 1 Box 11     pantoprazole (PROTONIX) 40 MG EC tablet TAKE ONE TABLET BY MOUTH ONE TIME DAILY  30 tablet 10     sertraline (ZOLOFT) 100 MG tablet TAKE 1 & 1/2 TABLETS DAILY 135 tablet 1       Problem List:  Patient Active Problem List    Diagnosis Date Noted     Fatigue, unspecified type 08/01/2018     Priority: Medium     Morbid obesity, unspecified obesity type (H) 12/11/2015     Priority: Medium     Hypothyroidism 04/14/2015     Priority: Medium     Family history of skin cancer 08/14/2014     Priority:  Medium     Multiple nevi 08/14/2014     Priority: Medium     Hypertension, goal below 140/90 01/07/2014     Priority: Medium     Pain in thoracic spine 02/27/2012     Priority: Medium     Mild major depression (H) 09/01/2011     Priority: Medium     Esophageal reflux 04/16/2004     Priority: Medium        Past Medical/Surgical History:  Past Medical History:   Diagnosis Date     Depressive disorder, not elsewhere classified     depression     Family history of skin cancer 8/14/2014     Thyroid disease      Unspecified essential hypertension      Past Surgical History:   Procedure Laterality Date     C REMOVAL OF OVARY(S)      left ovary removed-cyst, uterine fibroid removed     CHOLECYSTECTOMY, LAPOROSCOPIC  07/24/06    Cholecystectomy, Laparoscopic     ESOPHAGOSCOPY, GASTROSCOPY, DUODENOSCOPY (EGD), COMBINED N/A 2/5/2016    Procedure: COMBINED ESOPHAGOSCOPY, GASTROSCOPY, DUODENOSCOPY (EGD), BIOPSY SINGLE OR MULTIPLE;  Surgeon: Del Betts MD;  Location:  GI     GENITOURINARY SURGERY  1988    removal of left ovary with cyst and uterine fibroid     HC REMOVAL OF TONSILS,12+ Y/O  1977    Tonsils 11+y.o.     HC UGI ENDOSCOPY, SIMPLE EXAM  02/11/09       Social History:  Social History     Social History     Marital status:      Spouse name: N/A     Number of children: N/A     Years of education: N/A     Occupational History     Not on file.     Social History Main Topics     Smoking status: Never Smoker     Smokeless tobacco: Never Used      Comment: No smokers in home     Alcohol use Yes      Comment: 1-2 glasses of wine/week     Drug use: No     Sexual activity: Yes     Partners: Male     Birth control/ protection: Post-menopausal     Other Topics Concern     Parent/Sibling W/ Cabg, Mi Or Angioplasty Before 65f 55m? No     Social History Narrative       Family History:  Family History   Problem Relation Age of Onset     RAHUL.AFIORELLA Maternal Grandmother      MARIA LUISA Maternal Grandfather      MARIA LUISA  Paternal Grandfather       age 38     Respiratory Mother      sarcoidosis     Hypertension Mother      chronic     Depression Mother      10+ years     Colon Cancer Mother      Diabetes Father      Type II diabetes     Coronary Artery Disease Father      Heart Attack with Angioplasty/Stent     Arthritis No family hx of        Review of Systems:  A complete review of systems reviewed by me is negative with the exeption of what has been mentioned in the history of present illness.  CONSTITUTIONAL: NEGATIVE for weight gain/loss, fever, chills, sweats or night sweats, drug allergies.  EYES: NEGATIVE for changes in vision, blind spots, double vision.  ENT: NEGATIVE for ear pain, sore throat, bloody nose  ENT:  POSITIVE for  sinus pain, post-nasal drip and runny nose  CARDIAC: NEGATIVE for fast heartbeats or fluttering in chest, swollen legs or swollen feet.  CARDIAC:  POSITIVE for  chest pain, chest pressure and breathlessness when lying flat  NEUROLOGIC: NEGATIVE headaches, weakness in the arms or legs.  NEUROLOGIC:  POSITIVE for numbness in the arms or legs  DERMATOLOGIC: NEGATIVE for new moles or change in mole(s)  DERMATOLOGIC:  POSITIVE for  rashes  PULMONARY: NEGATIVE dry cough, productive cough, coughing up blood, wheezing or whistling when breathing.    PULMONARY:  POSITIVE for  SOB at rest and SOB with activity  GASTROINTESTINAL: NEGATIVE for nausea or vomitting, loose or watery stools, fat or grease in stools, constipation, abdominal pain, bowel movements black in color or blood noted.  GENITOURINARY: NEGATIVE for pain during urination, blood in urine, urinating more frequently than usual, irregular menstrual periods.  MUSCULOSKELETAL:  POSITIVE for  muscle pain, bone or joint pain and swollen joints  ENDOCRINE: NEGATIVE for increased thirst or urination, diabetes.  LYMPHATIC: NEGATIVE for swollen lymph nodes, lumps or bumps in the breasts or nipple discharge.  MENTAL HEALTH: POSITIVE for  "depression    Physical Examination:  Vitals: /76  Pulse 58  Temp 97.6  F (36.4  C) (Oral)  Resp 16  Ht 1.614 m (5' 3.56\")  Wt 108.9 kg (240 lb)  LMP 07/29/2003  SpO2 96%  BMI 41.77 kg/m2    Neck Cir (cm): 43 cm    Whitwell Total Score 8/13/2018   Total score - Whitwell 5       SANDY Total Score: 4 (08/13/18 1434)    GENERAL APPEARANCE: healthy, alert, no distress and cooperative  EYES: Eyes grossly normal to inspection, PERRL, conjunctivae and sclerae normal and lids and lashes normal  HENT: nose and mouth without ulcers or lesions, soft palate dependent, oral mucous membranes moist and oropharynx clear  NECK: no adenopathy, no asymmetry, masses, or scars, thyroid normal to palpation and trachea midline and normal to palpation  RESP: lungs clear to auscultation - no rales, rhonchi or wheezes  CV: regular rates and rhythm, normal S1 S2, no S3 or S4, no murmur, click or rub and no irregular beats  LYMPHATICS: no cervical adenopathy  MS: extremities normal- no gross deformities noted  NEURO: Normal strength and tone, mentation intact, speech normal and cranial nerves 2-12 intact  Mallampati Class: II.  Tonsillar Stage: 0  surgically removed.    Impression/Plan:    (R53.83) Fatigue, unspecified type  (primary encounter diagnosis), (R06.83) Snoring, (Z68.41) BMI 40.0-44.9, adult (H), (I10) Hypertension, goal below 140/90  Comment: Micki presents with a chief concern of fatigue/sleepiness over th last couple of years. She denies dozing inadvertently in the daytime (ESS is normal at 5/24), but she feels she could sleep 12 hours per day and nap daily. Her work schedule does not allow that, but she will sometimes sleep 11 hours on weekends plus nap for another 1-1.5 hours. She gets about 9 hours of sleep on weekdays, so she is not sleep deprived. She does snore loudly. She is told that she snorts herself awake, and she notices waking herself with a snort when she naps. She is not observed to have lenore pauses in " her breathing. His risk for LEROY is high. STOP BANG TOTAL: 6; snoring, sleepiness, HTN, BMI >40 (41), age >50 (57), neck >40 cm (43 cm). There is slight risk for hypoventilation given BMI of 41 and occasional morning headaches. However, her daytime SpO2 is normal at 96%, and CO2 on metabolic panel has not been elevated. She also wakes with leg cramps occasionally.  Plan: HST-Home Sleep Apnea Test            Literature provided regarding sleep apnea.      She will follow up with me in approximately two weeks after her sleep study has been competed to review the results and discuss plan of care.       Polysomnography reviewed.  Obstructive sleep apnea reviewed.  Complications of untreated sleep apnea were reviewed.  45 minutes was spent during this visit, over 50% in counseling and coordination of care.    Bennett Goltz, PA-C    CC: [facility]

## 2018-08-13 NOTE — NURSING NOTE
"Chief Complaint   Patient presents with     Sleep Problem     Chronic fatigue       Initial /76  Pulse 58  Temp 97.6  F (36.4  C) (Oral)  Resp 16  Ht 1.614 m (5' 3.56\")  Wt 108.9 kg (240 lb)  LMP 07/29/2003  SpO2 96%  BMI 41.77 kg/m2 Estimated body mass index is 41.77 kg/(m^2) as calculated from the following:    Height as of this encounter: 1.614 m (5' 3.56\").    Weight as of this encounter: 108.9 kg (240 lb).    Medication Reconciliation: complete    Neck circumference: 17 inches / 43 centimeters.    ESS 5    Clarissa Taveras MA      "

## 2018-08-13 NOTE — MR AVS SNAPSHOT
"              After Visit Summary   8/13/2018    Micki Santos    MRN: [id number]           Patient Information     Date Of Birth          1960        Visit Information        Provider Department      8/13/2018 2:30 PM Goltz, Bennett Ezra, PA-C [facility] [place]        Today's Diagnoses     Fatigue, unspecified type    -  1    Snoring        BMI 40.0-44.9, adult (H)        Hypertension, goal below 140/90          Care Instructions    MY TREATMENT INFORMATION FOR SLEEP APNEA-  Micki Santos    DOCTOR : Bennett Ezra Goltz, PA-C  SLEEP CENTER :  [place]     MY CONTACT NUMBER: [phone]    Am I having a sleep study at a sleep center?  Make sure you have an appointment for the study before you leave!    Am I having a home sleep study?  Watch this video:  [email or web address]  Please verify your insurance coverage with your insurance carrier    Frequently asked questions:  1. What is Obstructive Sleep Apnea (LEROY)? LEROY is the most common type of sleep apnea. Apnea means, \"without breath.\"  Apnea is most often caused by narrowing or collapse of the upper airway as muscles relax during sleep.   Almost everyone has occasional apneas. Most people with sleep apnea have had brief interruptions at night frequently for many years.  The severity of sleep apnea is related to how frequent and severe the events are.   2. What are the consequences of LEROY? Symptoms include: feeling sleepy during the day, snoring loudly, gasping or stopping of breathing, trouble sleeping, and occasionally morning headaches or heartburn at night.  Sleepiness can be serious and even increase the risk of falling asleep while driving. Other health consequences may include development of high blood pressure and other cardiovascular disease in persons who are susceptible. Untreated LEROY  can contribute to heart disease, stroke and diabetes.   3. What are the treatment " options? In most situations, sleep apnea is a lifelong disease that must be managed with daily therapy. Medications are not effective for sleep apnea and surgery is generally not considered until other therapies have been tried. Your treatment is your choice . Continuous Positive Airway (CPAP) works right away and is the therapy that is effective in nearly everyone. An oral device to hold your jaw forward is usually the next most reliable option. Other options include postioning devices (to keep you off your back), weight loss, and surgery including a tongue pacing device. There is more detail about some of these options below.    Important tips for using CPAP and similar devices   Know your equipment:  CPAP is continuous positive airway pressure that prevents obstructive sleep apnea by keeping the throat from collapsing while you are sleeping. In most cases, the device is  smart  and can slowly self-adjusts if your throat collapses and keeps a record every day of how well you are treated-this information is available to you and your care team.  BPAP is bilevel positive airway pressure that keeps your throat open and also assists each breath with a pressure boost to maintain adequate breathing.  Special kinds of BPAP are used in patients who have inadequate breathing from lung or heart disease. In most cases, the device is  smart  and can slowly self-adjusts to assist breathing. Like CPAP, the device keeps a record of how well you are treated.  Your mask is your connection to the device. You get to choose what feels most comfortable and the staff will help to make sure if fits. Here: are some examples of the different masks that are available:       Key points to remember on your journey with sleep apnea:  1. Sleep study.  PAP devices often need to be adjusted during a sleep study to show that they are effective and adjusted right.  2. Good tips to remember: Try wearing just the mask during a quiet time during the  day so your body adapts to wearing it. A humidifier is recommended for comfort in most cases to prevent drying of your nose and throat. Allergy medication from your provider may help you if you are having nasal congestion.  3. Getting settled-in. It takes more than one night for most of us to get used to wearing a mask. Try wearing just the mask during a quiet time during the day so your body adapts to wearing it. A humidifier is recommended for comfort in most cases. Our team will work with you carefully on the first day and will be in contact within 4 days and again at 2 and 4 weeks for advice and remote device adjustments. Your therapy is evaluated by the device each day.   4. Use it every night. The more you are able to sleep naturally for 7-8 hours, the more likely you will have good sleep and to prevent health risks or symptoms from sleep apnea. Even if you use it 4 hours it helps. Occasionally all of us are unable to use a medical therapy, in sleep apnea, it is not dangerous to miss one night.   5. Communicate. Call our skilled team on the number provided on the first day if your visit for problems that make it difficult to wear the device. Over 2 out of 3 patients can learn to wear the device long-term with help from our team. Remember to call our team or your sleep providers if you are unable to wear the device as we may have other solutions for those who cannot adapt to mask CPAP therapy. It is recommended that you sleep your sleep provider within the first 3 months and yearly after that if you are not having problems.   Take care of your equipment. Make sure you clean your mask and tubing using directions every day and that your filter and mask are replaced as recommended or if they are not working.     BESIDES CPAP, WHAT OTHER THERAPIES ARE THERE?    Positioning Device  Positioning devices are generally used when sleep apnea is mild and only occurs on your back.This example shows a pillow that straps  around the waist. It may be appropriate for those whose sleep study shows milder sleep apnea that occurs primarily when lying flat on one's back. Preliminary studies have shown benefit but effectiveness at home may need to be verified by a home sleep test. These devices are generally not covered by medical insurance.  Examples of devices that maintain sleeping on the back to prevent snoring and mild sleep apnea.    Belt type body positioner  Http://Lamahui.Ombitron/    Electronic reminder  Http://nightshifttherapy.com/  Http://www.TouchOfModern.com.Ombitron.au/    Oral Appliance  What is oral appliance therapy?  An oral appliance device fits on your teeth at night like a retainer used after having braces. The device is made by a specialized dentist and requires several visits over 1-2 months before a manufactured device is made to fit your teeth and is adjusted to prevent your sleep apnea. Once an oral device is working properly, snoring should be improved. A home sleep test may be recommended at that time if to determine whether the sleep apnea is adequately treated.       Some things to remember:  -Oral devices are often, but not always, covered by your medical insurance. Be sure to check with your insurance provider.   -If you are referred for oral therapy, you will be given a list of specialized dentists to consider or you may choose to visit the Web site of the American Academy of Dental Sleep Medicine  -Oral devices are less likely to work if you have severe sleep apnea or are extremely overweight.     More detailed information  An oral appliance is a small acrylic device that fits over the upper and lower teeth  (similar to a retainer or a mouth guard). This device slightly moves jaw forward, which moves the base of the tongue forward, opens the airway, improves breathing for effective treat snoring and obstructive sleep apnea in perhaps 7 out of 10 people .  The best working devices are custom-made by a dental device   after a mold is made of the teeth 1, 2, 3.  When is an oral appliance indicated?  Oral appliance therapy is recommended as a first-line treatment for patients with primary snoring, mild sleep apnea, and for patients with moderate sleep apnea who prefer appliance therapy to use of CPAP4, 5. Severity of sleep apnea is determined by sleep testing and is based on the number of respiratory events per hour of sleep.   How successful is oral appliance therapy?  The success rate of oral appliance therapy in patients with mild sleep apnea is 75-80% while in patients with moderate sleep apnea it is 50-70%. The chance of success in patients with severe sleep apnea is 40-50%. The research also shows that oral appliances have a beneficial effect on the cardiovascular health of LEROY patients at the same magnitude as CPAP therapy7.  Oral appliances should be a second-line treatment in cases of severe sleep apnea, but if not completely successful then a combination therapy utilizing CPAP plus oral appliance therapy may be effective. Oral appliances tend to be effective in a broad range of patients although studies show that the patients who have the highest success are females, younger patients, those with milder disease, and less severe obesity. 3, 6.   Finding a dentist that practices dental sleep medicine  Specific training is available through the American Academy of Dental Sleep Medicine for dentists interested in working in the field of sleep. To find a dentist who is educated in the field of sleep and the use of oral appliances, near you, visit the Web site of the American Academy of Dental Sleep Medicine.    References  1. Manuelito, et al. Objectively measured vs self-reported compliance during oral appliance therapy for sleep-disordered breathing. Chest 2013; 144(5): 2781-2523.  2. Nazia et al. Objective measurement of compliance during oral appliance therapy for sleep-disordered breathing. Thorax  2013; 68(1): 91-96.  3. Blessing et al. Mandibular advancement devices in 620 men and women with LEROY and snoring: tolerability and predictors of treatment success. Chest 2004; 125: 6387-3123.  4. Monse et al. Oral appliances for snoring and LEROY: a review. Sleep 2006; 29: 244-262.  5. Sheldon et al. Oral appliance treatment for LEROY: an update. J Clin Sleep Med 2014; 10(2): 215-227.  6. Gillian et al. Predictors of OSAH treatment outcome. J Dent Res 2007; 86: 4811-0413.    Weight Loss:    Weight loss is a long-term strategy that may improve sleep apnea in some patients.    Weight management is a personal decision and the decision should be based on your interest and the potential benefits.  If you are interested in exploring weight loss strategies, the following discussion covers the impact on weight loss on sleep apnea and the approaches that may be successful.    Being overweight does not necessarily mean you will have health consequences.  Those who have BMI over 35 or over 27 with existing medical conditions carries greater risk.   Weight loss decreases severity of sleep apnea in most people with obesity. For those with mild obesity who have developed snoring with weight gain, even 15-30 pound weight loss can improve and occasionally eliminate sleep apnea.  Structured and life-long dietary and health habits are necessary to lose weight and keep healthier weight levels.     Though there may be significant health benefits from weight loss, long-term weight loss is very difficult to achieve- studies show success with dietary management in less than 10% of people. In addition, substantial weight loss may require years of dietary control and may be difficult if patients have severe obesity. In these cases, surgical management may be considered.  Finally, older individuals who have tolerated obesity without health complications may be less likely to benefit from weight loss strategies.      Your BMI is Body  mass index is 41.77 kg/(m^2).  Weight management is a personal decision.  If you are interested in exploring weight loss strategies, the following discussion covers the approaches that may be successful. Body mass index (BMI) is one way to tell whether you are at a healthy weight, overweight, or obese. It measures your weight in relation to your height.  A BMI of 18.5 to 24.9 is in the healthy range. A person with a BMI of 25 to 29.9 is considered overweight, and someone with a BMI of 30 or greater is considered obese. More than two-thirds of American adults are considered overweight or obese.  Being overweight or obese increases the risk for further weight gain. Excess weight may lead to heart disease and diabetes.  Creating and following plans for healthy eating and physical activity may help you improve your health.  Weight control is part of healthy lifestyle and includes exercise, emotional health, and healthy eating habits. Careful eating habits lifelong are the mainstay of weight control. Though there are significant health benefits from weight loss, long-term weight loss with diet alone may be very difficult to achieve- studies show long-term success with dietary management in less than 10% of people. Attaining a healthy weight may be especially difficult to achieve in those with severe obesity. In some cases, medications, devices and surgical management might be considered.  What can you do?  If you are overweight or obese and are interested in methods for weight loss, you should discuss this with your provider.     Consider reducing daily calorie intake by 500 calories.     Keep a food journal.     Avoiding skipping meals, consider cutting portions instead.    Diet combined with exercise helps maintain muscle while optimizing fat loss. Strength training is particularly important for building and maintaining muscle mass. Exercise helps reduce stress, increase energy, and improves fitness. Increasing  exercise without diet control, however, may not burn enough calories to loose weight.       Start walking three days a week 10-20 minutes at a time    Work towards walking thirty minutes five days a week     Eventually, increase the speed of your walking for 1-2 minutes at time    In addition, we recommend that you review healthy lifestyles and methods for weight loss available through the National Institutes of Health patient information sites:  http://win.niddk.nih.gov/publications/index.htm    And look into health and wellness programs that may be available through your health insurance provider, employer, local community center, or hyun club.    Weight management plan: Patient was referred to their PCP to discuss a diet and exercise plan.    Surgery:    Surgery for obstructive sleep apnea is considered generally only when other therapies fail to work. Surgery may be discussed with you if you are having a difficult time tolerating CPAP and or when there is an abnormal structure that requires surgical correction.  Nose and throat surgeries often enlarge the airway to prevent collapse.  Most of these surgeries create pain for 1-2 weeks and up to half of the most common surgeries are not effective throughout life.  You should carefully discuss the benefits and drawbacks to surgery with your sleep provider and surgeon to determine if it is the best solution for you.   More information  Surgery for LEROY is directed at areas that are responsible for narrowing or complete obstruction of the airway during sleep.  There are a wide range of procedures available to enlarge and/or stabilize the airway to prevent blockage of breathing in the three major areas where it can occur: the palate, tongue, and nasal regions.  Successful surgical treatment depends on the accurate identification of the factors responsible for obstructive sleep apnea in each person.  A personalized approach is required because there is no single  treatment that works well for everyone.  Because of anatomic variation, consultation with an examination by a sleep surgeon is a critical first step in determining what surgical options are best for each patient.  In some cases, examination during sedation may be recommended in order to guide the selection of procedures.  Patients will be counseled about risks and benefits as well as the typical recovery course after surgery. Surgery is typically not a cure for a person s LEROY.  However, surgery will often significantly improve one s LEROY severity (termed  success rate ).  Even in the absence of a cure, surgery will decrease the cardiovascular risk associated with OSA7; improve overall quality of life8 (sleepiness, functionality, sleep quality, etc).  Palate Procedures:  Patients with LEROY often have narrowing of their airway in the region of their tonsils and uvula.  The goals of palate procedures are to widen the airway in this region as well as to help the tissues resist collapse.  Modern palate procedure techniques focus on tissue conservation and soft tissue rearrangement, rather than tissue removal.  Often the uvula is preserved in this procedure. Residual sleep apnea is common in patient after pharyngoplasty with an average reduction in sleep apnea events of 33%2.    Tongue Procedures:  ExamWhile patients are awake, the muscles that surround the throat are active and keep this region open for breathing. These muscles relax during sleep, allowing the tongue and other structures to collapse and block breathing.  There are several different tongue procedures available.  Selection of a tongue base procedure depends on characteristics seen on physical exam.  Generally, procedures are aimed at removing bulky tissues in this area or preventing the back of the tongue from falling back during sleep.  Success rates for tongue surgery range from 50-62%3.  Hypoglossal Nerve Stimulation:  Hypoglossal nerve stimulation has  recently received approval from the [place] Food and Drug Administration for the treatment of obstructive sleep apnea.  This is based on research showing that the system was safe and effective in treating sleep apnea6.  Results showed that the median AHI score decreased 68%, from 29.3 to 9.0. This therapy uses an implant system that senses breathing patterns and delivers mild stimulation to airway muscles, which keeps the airway open during sleep.  The system consists of three fully implanted components: a small generator (similar in size to a pacemaker), a breathing sensor, and a stimulation lead.  Using a small handheld remote, a patient turns the therapy on before bed and off upon awakening.    Candidates for this device must be greater than 22 years of age, have moderate to severe LEROY (AHI between 20-65), BMI less than 32, have tried CPAP/oral appliance without success, and have appropriate upper airway anatomy (determined by a sleep endoscopy performed by Dr. Johnson).  Hypoglossal Nerve Stimulation Pathway:    The sleep surgeon s office will work with the patient through the insurance prior-authorization process (including communications and appeals).    Nasal Procedures:  Nasal obstruction can interfere with nasal breathing during the day and night.  Studies have shown that relief of nasal obstruction can improve the ability of some patients to tolerate positive airway pressure therapy for obstructive sleep apnea1.  Treatment options include medications such as nasal saline, topical corticosteroid and antihistamine sprays, and oral medications such as antihistamines or decongestants. Non-surgical treatments can include external nasal dilators for selected patients. If these are not successful by themselves, surgery can improve the nasal airway either alone or in combination with these other options.  Combination Procedures:  Combination of surgical procedures and other treatments may be recommended,  particularly if patients have more than one area of narrowing or persistent positional disease.  The success rate of combination surgery ranges from 66-80%2,3.    References  1. Jessica KAY. The Role of the Nose in Snoring and Obstructive Sleep Apnoea: An Update.  Eur Arch Otorhinolaryngol. 2011; 268: 1365-73.  2.  Jil SM; Chapo JA; Enedelia JR; Pallanch JF; Sky MB; Minal SG; Sharon BENNETT. Surgical modifications of the upper airway for obstructive sleep apnea in adults: a systematic review and meta-analysis. SLEEP 2010;33(10):8151-1031. Raquel MICHAELS. Hypopharyngeal surgery in obstructive sleep apnea: an evidence-based medicine review.  Arch Otolaryngol Head Neck Surg. 2006 Feb;132(2):206-13.  3. Yogi SHEPHEDRH1, Ronnell Y, Pavel SHUKRI. The efficacy of anatomically based multilevel surgery for obstructive sleep apnea. Otolaryngol Head Neck Surg. 2003 Oct;129(4):327-35.  4. Raquel MICHAELS, Goldberg A. Hypopharyngeal Surgery in Obstructive Sleep Apnea: An Evidence-Based Medicine Review. Arch Otolaryngol Head Neck Surg. 2006 Feb;132(2):206-13.  5. Anderson PJ et al. Upper-Airway Stimulation for Obstructive Sleep Apnea.  N Engl J Med. 2014 Jan 9;370(2):139-49.  6. Curly Y et al. Increased Incidence of Cardiovascular Disease in Middle-aged Men with Obstructive Sleep Apnea. Am J Respir Crit Care Med; 2002 166: 159-165  7. Baumann EM et al. Studying Life Effects and Effectiveness of Palatopharyngoplasty (SLEEP) study: Subjective Outcomes of Isolated Uvulopalatopharyngoplasty. Otolaryngol Head Neck Surg. 2011; 144: 623-631.              Follow-ups after your visit        Your next 10 appointments already scheduled     Aug 16, 2018  1:30 PM CDT   HST  with BED 7  SLEEP   [facility] ([facility])    [address] [address]   [phone]            Aug 17, 2018 10:30 AM CDT   HST Drop Off with  SLEEP CENTER [facility] [place] ([facility]  "[facility])    [address]  [place] [id number] [phone]            Aug 24, 2018  4:00 PM CDT   Return Sleep Patient with Bennett Ezra Goltz, PA-C   [facility] ([facility])    [address]  [place] [id number] [phone]              Future tests that were ordered for you today     Open Future Orders        Priority Expected Expires Ordered    HST-Home Sleep Apnea Test Routine  2/12/2019 8/13/2018            Who to contact     If you have questions or need follow up information about today's clinic visit or your schedule please contact [facility] directly at [phone].  Normal or non-critical lab and imaging results will be communicated to you by MyChart, letter or phone within 4 business days after the clinic has received the results. If you do not hear from us within 7 days, please contact the clinic through [email or web address]!hart or phone. If you have a critical or abnormal lab result, we will notify you by phone as soon as possible.  Submit refill requests through [facility] or call your pharmacy and they will forward the refill request to us. Please allow 3 business days for your refill to be completed.          Additional Information About Your Visit        [facility] Information     [facility] gives you secure access to your electronic health record. If you see a primary care provider, you can also send messages to your care team and make appointments. If you have questions, please call your primary care clinic.  If you do not have a primary care provider, please call [phone] and they will assist you.        Care EveryWhere ID     This is your Care EveryWhere ID. This could be used by other organizations to access your [facility] medical records  [id number]        Your Vitals Were     Pulse Temperature Respirations Height Last Period Pulse Oximetry    58 97.6  F (36.4  C) (Oral) 16 1.614 m (5' 3.56\") " 07/29/2003 96%    BMI (Body Mass Index)                   41.77 kg/m2            Blood Pressure from Last 3 Encounters:   08/13/18 132/82   07/30/18 122/68   05/14/18 136/84    Weight from Last 3 Encounters:   08/13/18 108.9 kg (240 lb)   07/30/18 108.4 kg (239 lb)   05/14/18 111.6 kg (246 lb)              We Performed the Following     SLEEP EVALUATION & MANAGEMENT REFERRAL - ADULT -[facility] [phone] (Age 13 if over 100 lbs)        Primary Care Provider Office Phone # Fax #    Miranda Bosch [phone]       [address] [facility] [address]  [facility] [address]        Equal Access to Services     CAL HEIN : Brenda ureña Somega, waaxda luqadaha, qaybta kaalmada adeegyada, megan ravi . So [facility] [phone].    ATENCIÓN: Si habla español, tiene a dumont disposición servicios gratuitos de asistencia lingüística. [facility] [phone].    We comply with applicable federal civil rights laws and [place] laws. We do not discriminate on the basis of race, color, national origin, age, disability, sex, sexual orientation, or gender identity.            Thank you!     Thank you for choosing [facility]  for your care. Our goal is always to provide you with excellent care. Hearing back from our patients is one way we can continue to improve our services. Please take a few minutes to complete the written survey that you may receive in the mail after your visit with us. Thank you!             Your Updated Medication List - Protect others around you: Learn how to safely use, store and throw away your medicines at www.disposemymeds.org.          This list is accurate as of 8/13/18  3:24 PM.  Always use your most recent med list.                   Brand Name Dispense Instructions for use Diagnosis    atenolol 50 MG tablet    TENORMIN    90 tablet    Take 1 tablet (50 mg) by mouth daily    Essential hypertension with goal blood  pressure less than 140/90       levothyroxine 137 MCG tablet    SYNTHROID/LEVOTHROID    90 tablet    Take 1 tablet (137 mcg) by mouth daily    Hypothyroidism, unspecified type       lisinopril 20 MG tablet    PRINIVIL/ZESTRIL    90 tablet    Take 1 tablet (20 mg) by mouth daily    Essential hypertension with goal blood pressure less than 140/90       mometasone 50 MCG/ACT spray    NASONEX    1 Box    Spray 2 sprays into both nostrils daily    Chronic rhinitis, unspecified type       pantoprazole 40 MG EC tablet    PROTONIX    30 tablet    TAKE ONE TABLET BY MOUTH ONE TIME DAILY    Gastroesophageal reflux disease without esophagitis       sertraline 100 MG tablet    ZOLOFT    135 tablet    TAKE 1 & 1/2 TABLETS DAILY    Major depressive disorder, recurrent episode, mild (H)

## 2018-08-14 ENCOUNTER — TELEPHONE (OUTPATIENT)
Dept: FAMILY MEDICINE | Facility: OTHER | Age: 58
End: 2018-08-14

## 2018-08-14 NOTE — TELEPHONE ENCOUNTER
Summary:    Patient is due/failing the following:   COLONOSCOPY    Action needed:   Schedule a colonoscopy     Type of outreach:    Phone, left message for patient to call back.     Questions for provider review:    None                                                                                                                                    Jaquelin Domingo       Chart routed to Care Team .          Panel Management Review      Patient has the following on her problem list:     Depression / Dysthymia review    Measure:  Needs PHQ-9 score of 4 or less during index window.  Administer PHQ-9 and if score is 5 or more, send encounter to provider for next steps.        PHQ-9 SCORE 5/16/2017 5/14/2018 7/30/2018   Total Score - - -   Total Score MyChart 3 (Minimal depression) 11 (Moderate depression) -   Total Score - 11 9       If PHQ-9 recheck is 5 or more, route to provider for next steps.    Patient is due for:  none  Hypertension   Last three blood pressure readings:  BP Readings from Last 3 Encounters:   08/13/18 132/82   07/30/18 122/68   05/14/18 136/84     Blood pressure: Passed    HTN Guidelines:  Age 18-59 BP range:  Less than 140/90  Age 60-85 with Diabetes:  Less than 140/90  Age 60-85 without Diabetes:  less than 150/90      Composite cancer screening  Chart review shows that this patient is due/due soon for the following Pap Smear and Mammogram

## 2018-08-16 ENCOUNTER — OFFICE VISIT (OUTPATIENT)
Dept: SLEEP MEDICINE | Facility: CLINIC | Age: 58
End: 2018-08-16
Payer: COMMERCIAL

## 2018-08-16 DIAGNOSIS — R53.83 FATIGUE, UNSPECIFIED TYPE: ICD-10-CM

## 2018-08-16 DIAGNOSIS — G47.33 OSA (OBSTRUCTIVE SLEEP APNEA): ICD-10-CM

## 2018-08-16 DIAGNOSIS — R06.83 SNORING: ICD-10-CM

## 2018-08-16 DIAGNOSIS — I10 HYPERTENSION, GOAL BELOW 140/90: ICD-10-CM

## 2018-08-16 PROCEDURE — G0399 HOME SLEEP TEST/TYPE 3 PORTA: HCPCS | Performed by: INTERNAL MEDICINE

## 2018-08-16 NOTE — MR AVS SNAPSHOT
After Visit Summary   8/16/2018    Micki Santos    MRN: [id number]           Patient Information     Date Of Birth          1960        Visit Information        Provider Department      8/16/2018 1:30 PM BED 7 SH SLEEP [facility]        Today's Diagnoses     Fatigue, unspecified type        Snoring        BMI 40.0-44.9, adult (H)        Hypertension, goal below 140/90           Follow-ups after your visit        Your next 10 appointments already scheduled     Aug 17, 2018 10:30 AM CDT   HST Drop Off with BED 7 SH SLEEP   [facility] ([facility])    [address]  [facility] [id number] [phone]            Aug 24, 2018  4:00 PM CDT   Return Sleep Patient with Bennett Ezra Goltz, PA-C   [facility] ([facility])    [address] [id number] [phone]              Who to contact     If you have questions or need follow up information about today's clinic visit or your schedule please contact [facility] directly at [phone].  Normal or non-critical lab and imaging results will be communicated to you by [employer], letter or phone within 4 business days after the clinic has received the results. If you do not hear from us within 7 days, please contact the clinic through [employer] or phone. If you have a critical or abnormal lab result, we will notify you by phone as soon as possible.  Submit refill requests through [employer] or call your pharmacy and they will forward the refill request to us. Please allow 3 business days for your refill to be completed.          Additional Information About Your Visit        MyChart Information     [employer] gives you secure access to your electronic health record. If you see a primary care provider, you can also send messages to your care team and make appointments. If you have questions, please  call your primary care clinic.  If you do not have a primary care provider, please call [phone] and they will assist you.        Care EveryWhere ID     This is your Care EveryWhere ID. This could be used by other organizations to access your [place] medical records  [id number]        Your Vitals Were     Last Period                   07/29/2003            Blood Pressure from Last 3 Encounters:   08/13/18 132/82   07/30/18 122/68   05/14/18 136/84    Weight from Last 3 Encounters:   08/13/18 108.9 kg (240 lb)   07/30/18 108.4 kg (239 lb)   05/14/18 111.6 kg (246 lb)              We Performed the Following     HST-Home Sleep Apnea Test        Primary Care Provider Office Phone # Fax #    Miranda F MD Danitza [phone]       [address]  [facility] [address]        Equal Access to Services     LAURIE HEIN : Hadii aad ku hadasho Soomaali, waaxda luqadaha, qaybta kaalmada adeegyada, waxay sarahin hayrobert ravi . So [facility] [phone].    ATENCIÓN: Si habla español, tiene a dumont disposición servicios gratuitos de asistencia lingüística. Corine al [phone].    We comply with applicable federal civil rights laws and [place] laws. We do not discriminate on the basis of race, color, national origin, age, disability, sex, sexual orientation, or gender identity.            Thank you!     Thank you for choosing [facility]  for your care. Our goal is always to provide you with excellent care. Hearing back from our patients is one way we can continue to improve our services. Please take a few minutes to complete the written survey that you may receive in the mail after your visit with us. Thank you!             Your Updated Medication List - Protect others around you: Learn how to safely use, store and throw away your medicines at www.disposemymeds.org.          This list is accurate as of 8/16/18 11:59 PM.  Always use your most recent med list.                    Brand Name Dispense Instructions for use Diagnosis    atenolol 50 MG tablet    TENORMIN    90 tablet    Take 1 tablet (50 mg) by mouth daily    Essential hypertension with goal blood pressure less than 140/90       levothyroxine 137 MCG tablet    SYNTHROID/LEVOTHROID    90 tablet    Take 1 tablet (137 mcg) by mouth daily    Hypothyroidism, unspecified type       lisinopril 20 MG tablet    PRINIVIL/ZESTRIL    90 tablet    Take 1 tablet (20 mg) by mouth daily    Essential hypertension with goal blood pressure less than 140/90       mometasone 50 MCG/ACT spray    NASONEX    1 Box    Spray 2 sprays into both nostrils daily    Chronic rhinitis, unspecified type       pantoprazole 40 MG EC tablet    PROTONIX    30 tablet    TAKE ONE TABLET BY MOUTH ONE TIME DAILY    Gastroesophageal reflux disease without esophagitis       sertraline 100 MG tablet    ZOLOFT    135 tablet    TAKE 1 & 1/2 TABLETS DAILY    Major depressive disorder, recurrent episode, mild (H)

## 2018-08-16 NOTE — TELEPHONE ENCOUNTER
Left message for patient to return call to schedule colonoscopy or EGD. If Yusra or Mikki are unavailable, please transfer to the surgery center.       Letter sent

## 2018-08-17 ENCOUNTER — DOCUMENTATION ONLY (OUTPATIENT)
Dept: SLEEP MEDICINE | Facility: CLINIC | Age: 58
End: 2018-08-17
Payer: COMMERCIAL

## 2018-08-17 ENCOUNTER — TELEPHONE (OUTPATIENT)
Dept: SLEEP MEDICINE | Facility: CLINIC | Age: 58
End: 2018-08-17

## 2018-08-17 DIAGNOSIS — G47.33 OSA (OBSTRUCTIVE SLEEP APNEA): ICD-10-CM

## 2018-08-17 NOTE — TELEPHONE ENCOUNTER
Left voice mail for patient to call us to reschedule her oximetry study. It was intermittent and needs to be worn all night long. You may have to reschedule follow-up with Bennett Goltz.    Shirley Nagy

## 2018-08-17 NOTE — PROGRESS NOTES
Pt is completing a home sleep test. Pt was instructed on how to put on the Noxturnal T3 device and associated equipment before going to bed and given the opportunity to practice putting it on before leaving the sleep center. Pt was reminded to bring the home sleep test kit back to the center tomorrow, at agreed upon time for download and reporting.

## 2018-08-20 ENCOUNTER — OFFICE VISIT (OUTPATIENT)
Dept: SLEEP MEDICINE | Facility: CLINIC | Age: 58
End: 2018-08-20
Payer: COMMERCIAL

## 2018-08-20 DIAGNOSIS — G47.33 OSA (OBSTRUCTIVE SLEEP APNEA): ICD-10-CM

## 2018-08-20 PROCEDURE — G0399 HOME SLEEP TEST/TYPE 3 PORTA: HCPCS | Performed by: INTERNAL MEDICINE

## 2018-08-20 NOTE — MR AVS SNAPSHOT
After Visit Summary   8/20/2018    Micki Santos    MRN: [id number]           Patient Information     Date Of Birth          1960        Visit Information        Provider Department      8/20/2018 4:00 PM BED 7 SH SLEEP [facility]        Today's Diagnoses     LEROY (obstructive sleep apnea)           Follow-ups after your visit        Your next 10 appointments already scheduled     Aug 27, 2018  4:30 PM CDT   Telephone Visit with Bennett Ezra Goltz, PA-C   [facility] ([facility])    [address] [address] [phone]           Note: this is not an onsite visit; there is no need to come to the facility.              Who to contact     If you have questions or need follow up information about today's clinic visit or your schedule please contact [facility] directly at [phone].  Normal or non-critical lab and imaging results will be communicated to you by [employer], letter or phone within 4 business days after the clinic has received the results. If you do not hear from us within 7 days, please contact the clinic through [employer] or phone. If you have a critical or abnormal lab result, we will notify you by phone as soon as possible.  Submit refill requests through [employer] or call your pharmacy and they will forward the refill request to us. Please allow 3 business days for your refill to be completed.          Additional Information About Your Visit        .comhart Information     [employer] gives you secure access to your electronic health record. If you see a primary care provider, you can also send messages to your care team and make appointments. If you have questions, please call your primary care clinic.  If you do not have a primary care provider, please call [phone] and they will assist you.        Care EveryWhere ID     This is your Care EveryWhere ID. This could be used by  other organizations to access your [place] medical records  [id number]        Your Vitals Were     Last Period                   07/29/2003            Blood Pressure from Last 3 Encounters:   08/13/18 132/82   07/30/18 122/68   05/14/18 136/84    Weight from Last 3 Encounters:   08/13/18 108.9 kg (240 lb)   07/30/18 108.4 kg (239 lb)   05/14/18 111.6 kg (246 lb)              Today, you had the following     No orders found for display       Primary Care Provider Office Phone # Fax #    Miranda WHITE MD Danitza [phone]       [address] [facility] [address]  [facility] [address]        Equal Access to Services     CAL HEIN : Brenda Perez, walaw clinton, qaaleshata kaalmada virgilio, megan vences. So [facility] [phone].    ATENCIÓN: Si habla español, tiene a dumont disposición servicios gratuitos de asistencia lingüística. Llame al [phone].    We comply with applicable federal civil rights laws and [place] laws. We do not discriminate on the basis of race, color, national origin, age, disability, sex, sexual orientation, or gender identity.            Thank you!     Thank you for choosing [facility]  for your care. Our goal is always to provide you with excellent care. Hearing back from our patients is one way we can continue to improve our services. Please take a few minutes to complete the written survey that you may receive in the mail after your visit with us. Thank you!             Your Updated Medication List - Protect others around you: Learn how to safely use, store and throw away your medicines at www.disposemymeds.org.          This list is accurate as of 8/20/18 11:59 PM.  Always use your most recent med list.                   Brand Name Dispense Instructions for use Diagnosis    atenolol 50 MG tablet    TENORMIN    90 tablet    Take 1 tablet (50 mg) by mouth daily    Essential hypertension with goal blood pressure less than  140/90       levothyroxine 137 MCG tablet    SYNTHROID/LEVOTHROID    90 tablet    Take 1 tablet (137 mcg) by mouth daily    Hypothyroidism, unspecified type       lisinopril 20 MG tablet    PRINIVIL/ZESTRIL    90 tablet    Take 1 tablet (20 mg) by mouth daily    Essential hypertension with goal blood pressure less than 140/90       mometasone 50 MCG/ACT spray    NASONEX    1 Box    Spray 2 sprays into both nostrils daily    Chronic rhinitis, unspecified type       pantoprazole 40 MG EC tablet    PROTONIX    30 tablet    TAKE ONE TABLET BY MOUTH ONE TIME DAILY    Gastroesophageal reflux disease without esophagitis       sertraline 100 MG tablet    ZOLOFT    135 tablet    TAKE 1 & 1/2 TABLETS DAILY    Major depressive disorder, recurrent episode, mild (H)

## 2018-08-21 PROBLEM — G47.33 OSA (OBSTRUCTIVE SLEEP APNEA): Status: ACTIVE | Noted: 2018-08-21

## 2018-08-21 NOTE — PROGRESS NOTES
This HSAT was performed using a Noxturnal T3 device which recorded snore, sound, movement activity, body position, nasal pressure, oronasal thermal airflow, pulse, oximetry and both chest and abdominal respiratory effort. HSAT data was restricted to the time patient states they were in bed.     HSAT was scored using 1B 4% hypopnea rule.     HST AHI (Non-PAT): (P) 50.8  Snoring was reported as very loud.  Time with SpO2 below 89% was 146.5 minutes.   Overall signal quality was good.     Pt will follow up with sleep provider to determine appropriate therapy.

## 2018-08-21 NOTE — PROCEDURES
"HOME SLEEP STUDY INTERPRETATION    Patient: Micki Santos  MRN: [id number]  YOB: 1960  Study Date: 2018  Referring Provider: Miranda Bosch;   Ordering Provider: Bennett Goltz, PA     Indications for Home Study: Micki Santos is a 57 year old female with a history of HTN, obesity who presents with symptoms suggestive of obstructive sleep apnea.    Estimated body mass index is 41.77 kg/(m^2) as calculated from the following:    Height as of 18: 1.614 m (5' 3.56\").    Weight as of 18: 108.9 kg (240 lb).  Total score - [place]: 5 (2018  2:34 PM)  STOP-BAN/8    Data: A full night home sleep study was performed recording the standard physiologic parameters including body position, movement, sound, nasal pressure, thermal oral airflow, chest and abdominal movements with respiratory inductance plethysmography, and oxygen saturation by pulse oximetry. Pulse rate was estimated by oximetry recording. This study was considered adequate based on > 4 hours of quality oximetry and respiratory recording. As specified by the AASM Manual for the Scoring of Sleep and Associated events, version 2.3, Rule VIII.D 1B, 4% oxygen desaturation scoring for hypopneas is used as a standard of care on all home sleep apnea testing.    Analysis Time:  540.7 minutes    Respiration:   Sleep Associated Hypoxemia: sustained hypoxemia was present. Baseline oxygen saturation was 90.8%.  Time with saturation less than or equal to 88% was 146.5 minutes. The lowest oxygen saturation was 67%.   Snoring: Snoring was present.  Respiratory events: The home study revealed a presence of 396 obstructive apneas and 3 mixed and 12 central apneas. There were 47 hypopneas resulting in a combined apnea/hypopnea index [AHI] of 50.8 events per hour.  AHI was 55.2 per hour supine, 0 per hour prone, 46 per hour on left side, and 74.3 per hour on right side.   Pattern: Excluding events noted above, respiratory " rate and pattern was Normal.    Position: Percent of time spent: supine - 23.3%, prone - 0%, on left - 38%, on right - 19.6%.     Heart Rate: By pulse oximetry normal rate was noted.     Assessment:   Severe obstructive sleep apnea.  Sleep associated hypoxemia was present.    Recommendations:  Consider polysomnography with full night PAP titration or empirical treatment with auto titration PAP therapy and follow up of oxygen saturation on therapy.   Suggest optimizing sleep hygiene and avoiding sleep deprivation.  Weight management.    Diagnosis Code(s): Obstructive Sleep Apnea G47.33, Hypoxemia G47.36    Leo Chavez MD, MD, August 21, 2018   Diplomate, American Board of Psychiatry and Neurology, Sleep Medicine

## 2018-08-27 ENCOUNTER — VIRTUAL VISIT (OUTPATIENT)
Dept: SLEEP MEDICINE | Facility: CLINIC | Age: 58
End: 2018-08-27
Payer: COMMERCIAL

## 2018-08-27 DIAGNOSIS — G47.33 OSA (OBSTRUCTIVE SLEEP APNEA): Primary | ICD-10-CM

## 2018-08-27 NOTE — PROGRESS NOTES
Sleep Study Follow-Up Visit:    Date on this visit: 8/27/2018    Micki Santos comes in today for follow-up of her home sleep study done on 8/16/2018 at the [facility] for fatigue for a couple of years. Her medical history is significant for morbid obesity, GERD, hypothyroidism, HTN, depression, joint pain.    Analysis Time:  540.7 minutes    Respiration:   Sleep Associated Hypoxemia: sustained hypoxemia was present.   Baseline oxygen saturation was 90.8%.  Time with saturation less   than or equal to 88% was 146.5 minutes. The lowest oxygen   saturation was 67%.   Snoring: Snoring was present.  Respiratory events: The home study revealed a presence of 396   obstructive apneas and 3 mixed and 12 central apneas. There were   47 hypopneas resulting in a combined apnea/hypopnea index [AHI]   of 50.8 events per hour.  AHI was 55.2 per hour supine, 0 per   hour prone, 46 per hour on left side, and 74.3 per hour on right   side.   Pattern: Excluding events noted above, respiratory rate and   pattern was Normal.    Position: Percent of time spent: supine - 23.3%, prone - 0%, on   left - 38%, on right - 19.6%.      She has difficulty breathing through her nose.    Past medical/surgical history, family history, social history, medications and allergies were reviewed.      Problem List:  Patient Active Problem List    Diagnosis Date Noted     LEROY (obstructive sleep apnea) 08/21/2018     Priority: Medium     Fatigue, unspecified type 08/01/2018     Priority: Medium     Morbid obesity, unspecified obesity type (H) 12/11/2015     Priority: Medium     Hypothyroidism 04/14/2015     Priority: Medium     Family history of skin cancer 08/14/2014     Priority: Medium     Multiple nevi 08/14/2014     Priority: Medium     Hypertension, goal below 140/90 01/07/2014     Priority: Medium     Pain in thoracic spine 02/27/2012     Priority: Medium     Mild major depression (H) 09/01/2011     Priority: Medium      Esophageal reflux 04/16/2004     Priority: Medium        Impression/Plan:    (G47.33) LEROY (obstructive sleep apnea)  (primary encounter diagnosis)  Comment: This study shows severe LEROY with hypoxemia  Plan: Comprehensive DME        I am prescribing auto CPAP 6-18 cm, heated humidifier and compliance meter. The patient was informed of the mask exchange policy and the compliance goals. They were also informed that they would be followed by the sleep therapy management team during the first month of CPAP use.       She will follow up with me in about 2 month(s).     <10 minutes spent with patient, all of which were spent counseling, consulting, coordinating plan of care.      Bennett Goltz, PA-C    CC: Miranda Bosch MD

## 2018-08-27 NOTE — PATIENT INSTRUCTIONS
You will be provided with an auto-titrating CPAP with a pressure range of 6-18 cm with heated humidity to limit nasal congestion. Adjust the heat level on humidifier to find a setting that prevents dry nose but does not cause condensation in the hose or mask. Use distilled water in the humidifier.  The CPAP has a ramp function that starts the pressure lower than your prescribed pressure and gradually increases it over a number of minutes.  This may make it easier to fall asleep.    Try to use the CPAP every-night, all night (minimum of 4 hours). Many insurances require that we prove you are using the CPAP at least 4 hours on at least 70% of nights over a 30 day period. We have 90 days to meet those criteria.            Discussed weight management and the impact of weight gain on sleep apnea.  Let me know if you snore or feel the pressure is too high.    You can get new supplies (mask, hose and filter) for your CPAP every 3-6 months, covered by insurance. You do not need to get supplies that often, but they are available if you would like them.  You may exchange the mask once within the first month if you feel the initial mask does not fit well.  Contact your medical equipment provider for equipment issues.  Please let me know if you have any return of snoring, daytime sleepiness or poor sleep quality. We will want to make sure your CPAP is adequately treating your apnea.  There is a website called CPAP.com that has accessories that may make CPAP use easier. Please visit it at your convenience.  Our phone number is [phone].    Follow-up 2 month.  Bring your CPAP machine with you to the follow up appointment.

## 2018-08-27 NOTE — MR AVS SNAPSHOT
After Visit Summary   8/27/2018    Micki Santos    MRN: [id number]           Patient Information     Date Of Birth          1960        Visit Information        Provider Department      8/27/2018 4:30 PM Goltz, Bennett Ezra, PA-C [facility]        Today's Diagnoses     LEROY (obstructive sleep apnea)    -  1      Care Instructions    You will be provided with an auto-titrating CPAP with a pressure range of 6-18 cm with heated humidity to limit nasal congestion. Adjust the heat level on humidifier to find a setting that prevents dry nose but does not cause condensation in the hose or mask. Use distilled water in the humidifier.  The CPAP has a ramp function that starts the pressure lower than your prescribed pressure and gradually increases it over a number of minutes.  This may make it easier to fall asleep.    Try to use the CPAP every-night, all night (minimum of 4 hours). Many insurances require that we prove you are using the CPAP at least 4 hours on at least 70% of nights over a 30 day period. We have 90 days to meet those criteria.            Discussed weight management and the impact of weight gain on sleep apnea.  Let me know if you snore or feel the pressure is too high.    You can get new supplies (mask, hose and filter) for your CPAP every 3-6 months, covered by insurance. You do not need to get supplies that often, but they are available if you would like them.  You may exchange the mask once within the first month if you feel the initial mask does not fit well.  Contact your medical equipment provider for equipment issues.  Please let me know if you have any return of snoring, daytime sleepiness or poor sleep quality. We will want to make sure your CPAP is adequately treating your apnea.  There is a website called CPAP.com that has accessories that may make CPAP use easier. Please visit it at your convenience.  Our phone number is [phone].    Follow-up 2  month.  Bring your CPAP machine with you to the follow up appointment.            Follow-ups after your visit        Follow-up notes from your care team     Return in about 2 months (around 10/27/2018) for CPAP compliance recheck.      Who to contact     If you have questions or need follow up information about today's clinic visit or your schedule please contact [facility] directly at [phone].  Normal or non-critical lab and imaging results will be communicated to you by BrainMasshart, letter or phone within 4 business days after the clinic has received the results. If you do not hear from us within 7 days, please contact the clinic through BrainMasshart or phone. If you have a critical or abnormal lab result, we will notify you by phone as soon as possible.  Submit refill requests through [employer] or call your pharmacy and they will forward the refill request to us. Please allow 3 business days for your refill to be completed.          Additional Information About Your Visit        BrainMasshart Information     [employer] gives you secure access to your electronic health record. If you see a primary care provider, you can also send messages to your care team and make appointments. If you have questions, please call your primary care clinic.  If you do not have a primary care provider, please call [phone] and they will assist you.        Care EveryWhere ID     This is your Care EveryWhere ID. This could be used by other organizations to access your [facility] medical records  [id number]        Your Vitals Were     Last Period                   07/29/2003            Blood Pressure from Last 3 Encounters:   08/13/18 132/82   07/30/18 122/68   05/14/18 136/84    Weight from Last 3 Encounters:   08/13/18 108.9 kg (240 lb)   07/30/18 108.4 kg (239 lb)   05/14/18 111.6 kg (246 lb)              We Performed the Following     Comprehensive DME        Primary Care Provider Office Phone # Fax #    Miranda Bosch  [phone]       [address]  [place] [address]        Equal Access to Services     CAL HEIN : Hadii aad ku hadhelgaleah Perez, waashubatool kesslerdaphneha, avinashta kabarrerada samraymon, megan smithin hayaaprem vargasanamika geraldineyeyoperico vences. So [facility] [phone].    ATENCIÓN: Si habla español, tiene a dumont disposición servicios gratuitos de asistencia lingüística. Llame al [phone].    We comply with applicable federal civil rights laws and [place] laws. We do not discriminate on the basis of race, color, national origin, age, disability, sex, sexual orientation, or gender identity.            Thank you!     Thank you for choosing [facility]  for your care. Our goal is always to provide you with excellent care. Hearing back from our patients is one way we can continue to improve our services. Please take a few minutes to complete the written survey that you may receive in the mail after your visit with us. Thank you!             Your Updated Medication List - Protect others around you: Learn how to safely use, store and throw away your medicines at www.disposemymeds.org.          This list is accurate as of 8/27/18  4:58 PM.  Always use your most recent med list.                   Brand Name Dispense Instructions for use Diagnosis    atenolol 50 MG tablet    TENORMIN    90 tablet    Take 1 tablet (50 mg) by mouth daily    Essential hypertension with goal blood pressure less than 140/90       levothyroxine 137 MCG tablet    SYNTHROID/LEVOTHROID    90 tablet    Take 1 tablet (137 mcg) by mouth daily    Hypothyroidism, unspecified type       lisinopril 20 MG tablet    PRINIVIL/ZESTRIL    90 tablet    Take 1 tablet (20 mg) by mouth daily    Essential hypertension with goal blood pressure less than 140/90       mometasone 50 MCG/ACT spray    NASONEX    1 Box    Spray 2 sprays into both nostrils daily    Chronic rhinitis, unspecified type       pantoprazole 40 MG EC tablet    PROTONIX    30  tablet    TAKE ONE TABLET BY MOUTH ONE TIME DAILY    Gastroesophageal reflux disease without esophagitis       sertraline 100 MG tablet    ZOLOFT    135 tablet    TAKE 1 & 1/2 TABLETS DAILY    Major depressive disorder, recurrent episode, mild (H)

## 2018-08-31 ENCOUNTER — TELEPHONE (OUTPATIENT)
Dept: SLEEP MEDICINE | Facility: CLINIC | Age: 58
End: 2018-08-31

## 2018-08-31 DIAGNOSIS — G47.33 OSA (OBSTRUCTIVE SLEEP APNEA): ICD-10-CM

## 2018-08-31 NOTE — TELEPHONE ENCOUNTER
CALLED PATIENT TO SCHEDULE CPAP SETUP. LEFT VOICEMAIL TO CALL [facility] BACK [phone]

## 2018-09-04 ENCOUNTER — TELEPHONE (OUTPATIENT)
Dept: SLEEP MEDICINE | Facility: CLINIC | Age: 58
End: 2018-09-04

## 2018-09-04 DIAGNOSIS — G47.33 OSA (OBSTRUCTIVE SLEEP APNEA): ICD-10-CM

## 2018-09-04 NOTE — TELEPHONE ENCOUNTER
Called patient to schedule appointment for new PAP setup. Scheduled appointment for 9/14/18 at 2pm. Gave patient address and location information.

## 2018-09-14 ENCOUNTER — DOCUMENTATION ONLY (OUTPATIENT)
Dept: SLEEP MEDICINE | Facility: CLINIC | Age: 58
End: 2018-09-14
Payer: COMMERCIAL

## 2018-09-14 DIAGNOSIS — G47.33 OSA (OBSTRUCTIVE SLEEP APNEA): ICD-10-CM

## 2018-09-14 NOTE — PROGRESS NOTES
Patient was offered choice of vendor and chose [facility].  Micki Santos was set up at [place] on September 14, 2018 Patient received a [employer] DreamStation Auto. Pressures were set at 6-18 cm H2O.   Patient s ramp is 5 cm H2O for 30 min and FLEX/EPR is C Flex, 2.  Patient received a [employer]med Mask name: Airtouch Full Face mask Size Medium, heated tubing and heated humidifier.  Patient is enrolled in the STM Program and does need to meet compliance. Patient has a follow up on TBD (needs to be scheduled) with Bennett Goltz, PA.    Latasha Choudhary

## 2018-09-17 ENCOUNTER — DOCUMENTATION ONLY (OUTPATIENT)
Dept: SLEEP MEDICINE | Facility: CLINIC | Age: 58
End: 2018-09-17

## 2018-09-17 DIAGNOSIS — G47.33 OSA (OBSTRUCTIVE SLEEP APNEA): ICD-10-CM

## 2018-09-17 NOTE — PROGRESS NOTES
3 DAY STM VISIT    Diagnostic AHI:  50.8 HST    Patient contacted for 3 day STM visit  Message left for patient to return call     Device type: Auto-CPAP  PAP settings from order::  CPAP min 6 cm  H20       CPAP max 18 cm  H20  Mask type:    Full Face Mask     Assessment: No usage reporting. Manual entry into [employer]  Action plan: Pt to have f/u 14 day STM visit.  Patient has a follow up visit scheduled:   no.

## 2018-09-20 NOTE — PROGRESS NOTES
Patient called and left a message for virtual care coordinator.  Coordinator called back and left another message requesting a call back again from patient.

## 2018-09-21 ENCOUNTER — TELEPHONE (OUTPATIENT)
Dept: FAMILY MEDICINE | Facility: OTHER | Age: 58
End: 2018-09-21

## 2018-09-21 NOTE — LETTER
[facility]  [address]   [place] [address]  Phone: [phone]  September 27, 2018      Micki Santos  [address] [place] [address]      Dear Micki,    We care about your health and have reviewed your health plan including your medical conditions, medications, and lab results.  Based on this review, it is recommended that you follow up regarding the following health topic(s):  -Depression  -Colon Cancer Screening    We recommend you take the following action(s):  -schedule a FOLLOWUP APPOINTMENT.  -schedule a COLONOSCOPY to look for colon cancer (due every 10 years or 5 years in higher risk situations.)  Colonoscopies can prevent 90-95% of colon cancer deaths.  Problem lesions can be removed before they ever become cancer.  If you do not wish to do a colonoscopy or cannot afford to do one at this time, there is another option called a Fecal Immunochemical Occult Blood Test (FIT) a take home stool sample kit.  It does not replace the colonoscopy for colorectal cancer screening, but it can detect hidden bleeding in the lower colon.  It does need to be repeated every year and if a positive result is obtained, you would be referred for a colonoscopy.  If you have completed either one of these tests at another facility, please have the records sent to our clinic for our records.  -Complete and return the attached PHQ-9 Form.  If your total score is greater than 9, please schedule a followup appointment.  If you answer Yes to question 9, call your clinic between the hours of 8 to 5.  You may also call the Suicide Hotline at 4-151-247-LHGA (7937) any time.     Please call us at the [facility] - [phone] (or use Foodyn) to address the above recommendations.     Thank you for trusting [facility] and we appreciate the opportunity to serve you.  We look forward to supporting your healthcare needs in the future.    Healthy Regards,    Your  Health Care Team  [facility]

## 2018-09-21 NOTE — TELEPHONE ENCOUNTER
Summary:    Patient is due/failing the following:   Depression follow up, COLONOSCOPY and PHQ9    Action needed:   Patient needs office visit for follow up., Patient needs to do PHQ9. and schedule a colonoscopy     Type of outreach:    Phone, left message for patient to call back.     Questions for provider review:    None                                                                                                                                    Jaquelin Domingo     Chart routed to Care Team .          Panel Management Review      Patient has the following on her problem list:     Depression / Dysthymia review    Measure:  Needs PHQ-9 score of 4 or less during index window.  Administer PHQ-9 and if score is 5 or more, send encounter to provider for next steps.        PHQ-9 SCORE 5/16/2017 5/14/2018 7/30/2018   Total Score - - -   Total Score MyChart 3 (Minimal depression) 11 (Moderate depression) -   Total Score - 11 9       If PHQ-9 recheck is 5 or more, route to provider for next steps.    Patient is due for:  PHQ9    Hypertension   Last three blood pressure readings:  BP Readings from Last 3 Encounters:   08/13/18 132/82   07/30/18 122/68   05/14/18 136/84     Blood pressure: Passed    HTN Guidelines:  Age 18-59 BP range:  Less than 140/90  Age 60-85 with Diabetes:  Less than 140/90  Age 60-85 without Diabetes:  less than 150/90      Composite cancer screening  Chart review shows that this patient is due/due soon for the following Colonoscopy

## 2018-10-01 ENCOUNTER — DOCUMENTATION ONLY (OUTPATIENT)
Dept: SLEEP MEDICINE | Facility: CLINIC | Age: 58
End: 2018-10-01

## 2018-10-01 DIAGNOSIS — G47.33 OSA (OBSTRUCTIVE SLEEP APNEA): ICD-10-CM

## 2018-10-01 NOTE — PROGRESS NOTES
14 DAY STM VISIT    Diagnostic AHI:  50.8 HST    Message left for patient to return call     Assessment: Pt not meeting objective benchmarks for leak    Action plan: pt to have 30 day STM visit.    Device type: Auto-CPAP  PAP settings: CPAP min 10 cm  H20     CPAP max 15 cm  H20    95th% pressure 10.5 cm   Mask type:  Full Face Mask  Objective measures: 14 day rolling measures      Compliance  85.7 %      Leak  24.9 %  last  upload      AHI 2.8   last  upload      Average number of minutes 444     Average hours of usage 7 hours 24 minutes          Objective measure goal  Compliance   Goal >70%  Leak   Goal < 24 lpm  AHI  Goal < 5  Usage  Goal >240

## 2018-10-08 ENCOUNTER — DOCUMENTATION ONLY (OUTPATIENT)
Dept: SLEEP MEDICINE | Facility: CLINIC | Age: 58
End: 2018-10-08
Payer: COMMERCIAL

## 2018-10-08 DIAGNOSIS — G47.33 OSA (OBSTRUCTIVE SLEEP APNEA): ICD-10-CM

## 2018-10-08 NOTE — PROGRESS NOTES
Patient did not show up or call to cancel scheduled mask fitting at [facility] today (1:00 PM appointment).

## 2018-10-15 ENCOUNTER — DOCUMENTATION ONLY (OUTPATIENT)
Dept: SLEEP MEDICINE | Facility: CLINIC | Age: 58
End: 2018-10-15

## 2018-10-15 DIAGNOSIS — G47.33 OSA (OBSTRUCTIVE SLEEP APNEA): ICD-10-CM

## 2018-10-15 NOTE — PROGRESS NOTES
30 DAY STM VISIT    Diagnostic AHI:  50.8 HST      Message left for patient to return call     Assessment: Pt not meeting objective benchmarks for leak    Action plan: waiting for patient to return call.   Patient has notscheduled a follow up visit with Bennett Goltz, PA   Device type: Auto-CPAP  PAP settings: CPAP min 10 cm  H20     CPAP max 15 cm  H20     90th % pressure 10.6 cm  H20    Mask type:  Full Face Mask    Objective measures: 14 day rolling measures         Compliance  76.7 %     % of night spent in large leak  32.4 % last  upload      AHI 2.5   last  upload      Average number of minutes 364          Objective measure goal  Compliance   Goal >70%  Leak   Goal < 10%  AHI  Goal < 5  Usage  Goal >240

## 2018-11-19 ASSESSMENT — ANXIETY QUESTIONNAIRES
1. FEELING NERVOUS, ANXIOUS, OR ON EDGE: NOT AT ALL
3. WORRYING TOO MUCH ABOUT DIFFERENT THINGS: NOT AT ALL
GAD7 TOTAL SCORE: 0
7. FEELING AFRAID AS IF SOMETHING AWFUL MIGHT HAPPEN: NOT AT ALL
5. BEING SO RESTLESS THAT IT IS HARD TO SIT STILL: NOT AT ALL
GAD7 TOTAL SCORE: 0
4. TROUBLE RELAXING: NOT AT ALL
7. FEELING AFRAID AS IF SOMETHING AWFUL MIGHT HAPPEN: NOT AT ALL
2. NOT BEING ABLE TO STOP OR CONTROL WORRYING: NOT AT ALL
GAD7 TOTAL SCORE: 0
6. BECOMING EASILY ANNOYED OR IRRITABLE: NOT AT ALL

## 2018-11-19 ASSESSMENT — PATIENT HEALTH QUESTIONNAIRE - PHQ9
SUM OF ALL RESPONSES TO PHQ QUESTIONS 1-9: 1
SUM OF ALL RESPONSES TO PHQ QUESTIONS 1-9: 1

## 2018-11-19 NOTE — PROGRESS NOTES
SUBJECTIVE:   Micik Santos is a 57 year old female who presents to clinic today for the following health issues:      HPI     Joint Pain    Onset: x 10 days    Description:   Location: right foot, pinky toe  Character: Sharp and Dull ache    Intensity: moderate, 5/10    Progression of Symptoms: worse    Accompanying Signs & Symptoms:  Other symptoms: numbness and swelling    History:   Previous similar pain: no       Precipitating factors:   Trauma or overuse: YES- Hit bed post    Alleviating factors:  Improved by: ice  Therapies Tried and outcome: Ice, Tylenol, Elevation-Rest    Answers for HPI/ROS submitted by the patient on 11/19/2018   PHQ9 TOTAL SCORE: 1  FATMATA 7 TOTAL SCORE: 0    Problem list and histories reviewed & adjusted, as indicated.  Additional history: as documented    Patient Active Problem List   Diagnosis     Esophageal reflux     Mild major depression (H)     Pain in thoracic spine     Hypertension, goal below 140/90     Family history of skin cancer     Multiple nevi     Hypothyroidism     Morbid obesity, unspecified obesity type (H)     Fatigue, unspecified type     LEROY (obstructive sleep apnea)     Past Surgical History:   Procedure Laterality Date     C REMOVAL OF OVARY(S)      left ovary removed-cyst, uterine fibroid removed     CHOLECYSTECTOMY, LAPOROSCOPIC  07/24/06    Cholecystectomy, Laparoscopic     ESOPHAGOSCOPY, GASTROSCOPY, DUODENOSCOPY (EGD), COMBINED N/A 2/5/2016    Procedure: COMBINED ESOPHAGOSCOPY, GASTROSCOPY, DUODENOSCOPY (EGD), BIOPSY SINGLE OR MULTIPLE;  Surgeon: Del Betts MD;  Location:  GI     GENITOURINARY SURGERY  1988    removal of left ovary with cyst and uterine fibroid     HC REMOVAL OF TONSILS,12+ Y/O  1977    Tonsils 11+y.o.     HC UGI ENDOSCOPY, SIMPLE EXAM  02/11/09       Social History   Substance Use Topics     Smoking status: Never Smoker     Smokeless tobacco: Never Used      Comment: No smokers in home     Alcohol use Yes      Comment: 1-2  "glasses of wine/week     Family History   Problem Relation Age of Onset     C.A.D. Maternal Grandmother      DONYA. Maternal Grandfather      CYeniferABOONE. Paternal Grandfather       age 38     Respiratory Mother      sarcoidosis     Hypertension Mother      chronic     Depression Mother      10+ years     Colon Cancer Mother      Diabetes Father      Type II diabetes     Coronary Artery Disease Father      Heart Attack with Angioplasty/Stent     Arthritis No family hx of          Current Outpatient Prescriptions   Medication Sig Dispense Refill     atenolol (TENORMIN) 50 MG tablet Take 1 tablet (50 mg) by mouth daily 90 tablet 1     levothyroxine (SYNTHROID/LEVOTHROID) 137 MCG tablet Take 1 tablet (137 mcg) by mouth daily 90 tablet 0     lisinopril (PRINIVIL/ZESTRIL) 20 MG tablet Take 1 tablet (20 mg) by mouth daily 90 tablet 1     mometasone (NASONEX) 50 MCG/ACT spray Spray 2 sprays into both nostrils daily 1 Box 11     pantoprazole (PROTONIX) 40 MG EC tablet TAKE ONE TABLET BY MOUTH ONE TIME DAILY  30 tablet 10     sertraline (ZOLOFT) 100 MG tablet TAKE 1 & 1/2 TABLETS DAILY 135 tablet 1     Allergies   Allergen Reactions     No Known Drug Allergies      BP Readings from Last 3 Encounters:   18 130/84   18 132/82   18 122/68    Wt Readings from Last 3 Encounters:   18 247 lb (112 kg)   18 240 lb (108.9 kg)   18 239 lb (108.4 kg)                  Labs reviewed in EPIC    ROS:  Constitutional, HEENT, cardiovascular, pulmonary, gi and gu systems are negative, except as otherwise noted.    OBJECTIVE:     /84 (BP Location: Left arm, Patient Position: Chair, Cuff Size: Adult Large)  Pulse 53  Temp 98  F (36.7  C) (Temporal)  Resp 16  Ht 5' 3.56\" (1.614 m)  Wt 247 lb (112 kg)  LMP 2003  SpO2 98%  BMI 42.99 kg/m2  Body mass index is 42.99 kg/(m^2).   Physical Exam   Constitutional: She is oriented to person, place, and time. She appears well-developed and " well-nourished.   HENT:   Head: Normocephalic and atraumatic.   Cardiovascular: Normal rate, regular rhythm and normal heart sounds.    Pulmonary/Chest: Effort normal and breath sounds normal.   Musculoskeletal:   TTP along the proximal phalynx of the 5th toe with some swelling   Neurological: She is alert and oriented to person, place, and time.   Psychiatric: She has a normal mood and affect.          Diagnostic Test Results:  none     ASSESSMENT/PLAN:     Problem List Items Addressed This Visit     None      Visit Diagnoses     Injury of toe on right foot, initial encounter    -  Primary    Relevant Orders    XR Toe Right G/E 2 Views         Likely a fracture.   Walking boot for 4 wks  Discussed NSAID , rest, ice and elevation  Discussed home care  Reportable signs and symptoms discussed  RTC if symptoms persist or fail to improve    Era Bedolla MD  [facility]

## 2018-11-20 ENCOUNTER — OFFICE VISIT (OUTPATIENT)
Dept: FAMILY MEDICINE | Facility: OTHER | Age: 58
End: 2018-11-20
Payer: COMMERCIAL

## 2018-11-20 ENCOUNTER — RADIANT APPOINTMENT (OUTPATIENT)
Dept: GENERAL RADIOLOGY | Facility: OTHER | Age: 58
End: 2018-11-20
Attending: FAMILY MEDICINE
Payer: COMMERCIAL

## 2018-11-20 VITALS
TEMPERATURE: 98 F | BODY MASS INDEX: 42.17 KG/M2 | SYSTOLIC BLOOD PRESSURE: 130 MMHG | HEIGHT: 64 IN | OXYGEN SATURATION: 98 % | RESPIRATION RATE: 16 BRPM | DIASTOLIC BLOOD PRESSURE: 84 MMHG | HEART RATE: 53 BPM | WEIGHT: 247 LBS

## 2018-11-20 DIAGNOSIS — S99.921A INJURY OF TOE ON RIGHT FOOT, INITIAL ENCOUNTER: Primary | ICD-10-CM

## 2018-11-20 DIAGNOSIS — S99.921A INJURY OF TOE ON RIGHT FOOT, INITIAL ENCOUNTER: ICD-10-CM

## 2018-11-20 PROCEDURE — 99214 OFFICE O/P EST MOD 30 MIN: CPT | Performed by: FAMILY MEDICINE

## 2018-11-20 PROCEDURE — 73660 X-RAY EXAM OF TOE(S): CPT | Mod: RT

## 2018-11-20 ASSESSMENT — PATIENT HEALTH QUESTIONNAIRE - PHQ9: SUM OF ALL RESPONSES TO PHQ QUESTIONS 1-9: 1

## 2018-11-20 ASSESSMENT — ANXIETY QUESTIONNAIRES: GAD7 TOTAL SCORE: 0

## 2018-11-20 ASSESSMENT — PAIN SCALES - GENERAL: PAINLEVEL: MODERATE PAIN (5)

## 2018-11-20 NOTE — MR AVS SNAPSHOT
After Visit Summary   11/20/2018    Micki Santos    MRN: [id number]           Patient Information     Date Of Birth          1960        Visit Information        Provider Department      11/20/2018 9:20 AM Era Bedolla MD [facility]        Today's Diagnoses     Injury of toe on right foot, initial encounter    -  1    Screen for colon cancer        Screening for HIV (human immunodeficiency virus)        Need for prophylactic vaccination and inoculation against influenza          Care Instructions      Closed Toe Fracture  Your toe is broken (fractured). This causes local pain, swelling, and sometimes bruising. This injury usually takes about 4 to 6 weeks to heal, but can sometimes take longer. Toe injuries are often treated by taping the injured toe to the next one (tamar taping). This protects the injured toe and holds it in position.     If the toenail has been severely injured, it may fall off in 1 to 2 weeks. It takes up to 12 months for a new toenail to grow back.  Home care  Follow these guidelines when caring for yourself at home:    You may be given a cast shoe to wear to keep your toe from moving. If not, you can use a sandal or any shoe that doesn t put pressure on the injured toe until the swelling and pain go away. If using a sandal, be careful not to strike your foot against anything. Another injury could make the fracture worse. If you were given crutches, don t put full weight on the injured foot until you can do so without pain, or as directed by your healthcare provider.    Keep your foot elevated to reduce pain and swelling. When sleeping, put a pillow under the injured leg. When sitting, support the injured leg so it is above your waist. This is very important during the first 2 days (48 hours).    Put an ice pack on the injured area. Do this for 20 minutes every 1 to 2 hours the first day for pain relief. You can make an ice pack by wrapping a  plastic bag of ice cubes in a thin towel. As the ice melts, be careful that any cloth or paper tape doesn t get wet. Continue using the ice pack 3 to 4 times a day for the next 2 days. Then use the ice pack as needed to ease pain and swelling.    If buddy tape was used and it becomes wet or dirty, change it. You may replace it with paper, plastic, or cloth tape. Cloth tape and paper tapes must be kept dry.    You may use acetaminophen or ibuprofen to control pain, unless another pain medicine was prescribed. If you have chronic liver or kidney disease, talk with your healthcare provider before using these medicines. Also talk with your provider if you ve had a stomach ulcer or gastrointestinal bleeding.    You may return to sports or physical education activities after 4 weeks when you can run without pain, or as directed by your healthcare provider.  Follow-up care  Follow up with your healthcare provider in 1 week, or as advised. This is to make sure the bone is healing the way it should.  X-rays may be taken. You will be told of any new findings that may affect your care.  When to seek medical advice  Call your healthcare provider right away if any of these occur:    Pain or swelling gets worse    The cast/splint cracks    The cast and padding get wet and stays wet more than 24 hours    Bad odor from the cast/splint or wound fluid stains the cast    Tightness or pressure under the cast/splint gets worse    Toe becomes cold, blue, numb, or tingly    You can t move the toe    Signs of infection: fever, redness, warmth, swelling, or drainage from the wound or cast    Fever of 100.4 F (38 C) or higher, or as directed by your healthcare provider  Date Last Reviewed: 2/1/2017 2000-2018 [employer]. [address] [place] [address], [place] [address]. All rights reserved. This information is not intended as a substitute for professional medical care. Always follow your healthcare professional's  "instructions.                Follow-ups after your visit        Who to contact     If you have questions or need follow up information about today's clinic visit or your schedule please contact [facility] directly at [phone].  Normal or non-critical lab and imaging results will be communicated to you by MyChart, letter or phone within 4 business days after the clinic has received the results. If you do not hear from us within 7 days, please contact the clinic through MyChart or phone. If you have a critical or abnormal lab result, we will notify you by phone as soon as possible.  Submit refill requests through [facility] or call your pharmacy and they will forward the refill request to us. Please allow 3 business days for your refill to be completed.          Additional Information About Your Visit        [employer] Information     [facility] gives you secure access to your electronic health record. If you see a primary care provider, you can also send messages to your care team and make appointments. If you have questions, please call your primary care clinic.  If you do not have a primary care provider, please call [phone] and they will assist you.        Care EveryWhere ID     This is your Care EveryWhere ID. This could be used by other organizations to access your [place] medical records  [id number]        Your Vitals Were     Pulse Temperature Respirations Height Last Period Pulse Oximetry    53 98  F (36.7  C) (Temporal) 16 5' 3.56\" (1.614 m) 07/29/2003 98%    BMI (Body Mass Index)                   42.99 kg/m2            Blood Pressure from Last 3 Encounters:   11/20/18 130/84   08/13/18 132/82   07/30/18 122/68    Weight from Last 3 Encounters:   11/20/18 247 lb (112 kg)   08/13/18 240 lb (108.9 kg)   07/30/18 239 lb (108.4 kg)               Primary Care Provider Office Phone # Fax #    Miranda Bosch [phone]       [address] [facility] [address]  [facility] [address]   "      Equal Access to Services     [facility] Center[facility]RISTOPHE : Hadii aad ku hadhelgaleah Leathaali, waashuda luqadaha, qaybta kabarreramegan fernandes. So [facility] [phone].    ATENCIÓN: Si habla español, tiene a dumont disposición servicios gratuitos de asistencia lingüística. Maxineame al [phone].    We comply with applicable federal civil rights laws and [place] laws. We do not discriminate on the basis of race, color, national origin, age, disability, sex, sexual orientation, or gender identity.            Thank you!     Thank you for choosing [facility]  for your care. Our goal is always to provide you with excellent care. Hearing back from our patients is one way we can continue to improve our services. Please take a few minutes to complete the written survey that you may receive in the mail after your visit with us. Thank you!             Your Updated Medication List - Protect others around you: Learn how to safely use, store and throw away your medicines at www.disposemymeds.org.          This list is accurate as of 11/20/18 10:07 AM.  Always use your most recent med list.                   Brand Name Dispense Instructions for use Diagnosis    atenolol 50 MG tablet    TENORMIN    90 tablet    Take 1 tablet (50 mg) by mouth daily    Essential hypertension with goal blood pressure less than 140/90       levothyroxine 137 MCG tablet    SYNTHROID/LEVOTHROID    90 tablet    Take 1 tablet (137 mcg) by mouth daily    Hypothyroidism, unspecified type       lisinopril 20 MG tablet    PRINIVIL/ZESTRIL    90 tablet    Take 1 tablet (20 mg) by mouth daily    Essential hypertension with goal blood pressure less than 140/90       mometasone 50 MCG/ACT spray    NASONEX    1 Box    Spray 2 sprays into both nostrils daily    Chronic rhinitis, unspecified type       pantoprazole 40 MG EC tablet    PROTONIX    30 tablet    TAKE ONE TABLET BY MOUTH ONE TIME DAILY    Gastroesophageal reflux disease  without esophagitis       sertraline 100 MG tablet    ZOLOFT    135 tablet    TAKE 1 & 1/2 TABLETS DAILY    Major depressive disorder, recurrent episode, mild (H)

## 2018-11-20 NOTE — PATIENT INSTRUCTIONS
Closed Toe Fracture  Your toe is broken (fractured). This causes local pain, swelling, and sometimes bruising. This injury usually takes about 4 to 6 weeks to heal, but can sometimes take longer. Toe injuries are often treated by taping the injured toe to the next one (buddy taping). This protects the injured toe and holds it in position.     If the toenail has been severely injured, it may fall off in 1 to 2 weeks. It takes up to 12 months for a new toenail to grow back.  Home care  Follow these guidelines when caring for yourself at home:    You may be given a cast shoe to wear to keep your toe from moving. If not, you can use a sandal or any shoe that doesn t put pressure on the injured toe until the swelling and pain go away. If using a sandal, be careful not to strike your foot against anything. Another injury could make the fracture worse. If you were given crutches, don t put full weight on the injured foot until you can do so without pain, or as directed by your healthcare provider.    Keep your foot elevated to reduce pain and swelling. When sleeping, put a pillow under the injured leg. When sitting, support the injured leg so it is above your waist. This is very important during the first 2 days (48 hours).    Put an ice pack on the injured area. Do this for 20 minutes every 1 to 2 hours the first day for pain relief. You can make an ice pack by wrapping a plastic bag of ice cubes in a thin towel. As the ice melts, be careful that any cloth or paper tape doesn t get wet. Continue using the ice pack 3 to 4 times a day for the next 2 days. Then use the ice pack as needed to ease pain and swelling.    If buddy tape was used and it becomes wet or dirty, change it. You may replace it with paper, plastic, or cloth tape. Cloth tape and paper tapes must be kept dry.    You may use acetaminophen or ibuprofen to control pain, unless another pain medicine was prescribed. If you have chronic liver or kidney disease,  talk with your healthcare provider before using these medicines. Also talk with your provider if you ve had a stomach ulcer or gastrointestinal bleeding.    You may return to sports or physical education activities after 4 weeks when you can run without pain, or as directed by your healthcare provider.  Follow-up care  Follow up with your healthcare provider in 1 week, or as advised. This is to make sure the bone is healing the way it should.  X-rays may be taken. You will be told of any new findings that may affect your care.  When to seek medical advice  Call your healthcare provider right away if any of these occur:    Pain or swelling gets worse    The cast/splint cracks    The cast and padding get wet and stays wet more than 24 hours    Bad odor from the cast/splint or wound fluid stains the cast    Tightness or pressure under the cast/splint gets worse    Toe becomes cold, blue, numb, or tingly    You can t move the toe    Signs of infection: fever, redness, warmth, swelling, or drainage from the wound or cast    Fever of 100.4 F (38 C) or higher, or as directed by your healthcare provider  Date Last Reviewed: 2/1/2017 2000-2018 [employer]. [address] [place] [address]. All rights reserved. This information is not intended as a substitute for professional medical care. Always follow your healthcare professional's instructions.

## 2018-11-23 ENCOUNTER — TELEPHONE (OUTPATIENT)
Dept: FAMILY MEDICINE | Facility: OTHER | Age: 58
End: 2018-11-23

## 2018-11-23 NOTE — TELEPHONE ENCOUNTER
Please inform pt that the x-ray of the toe showed a non displaced   fracture of the 1 st phalynx of the 5th toe . Continue to use the boot for 4-6 wks.

## 2018-11-26 NOTE — TELEPHONE ENCOUNTER
Spoke to patient and relayed message below. Patient expressed understanding. No further questions.

## 2018-11-29 ENCOUNTER — OFFICE VISIT (OUTPATIENT)
Dept: SLEEP MEDICINE | Facility: CLINIC | Age: 58
End: 2018-11-29
Payer: COMMERCIAL

## 2018-11-29 VITALS
HEART RATE: 58 BPM | RESPIRATION RATE: 16 BRPM | WEIGHT: 247 LBS | TEMPERATURE: 98 F | HEIGHT: 64 IN | SYSTOLIC BLOOD PRESSURE: 161 MMHG | DIASTOLIC BLOOD PRESSURE: 114 MMHG | OXYGEN SATURATION: 98 % | BODY MASS INDEX: 42.17 KG/M2

## 2018-11-29 DIAGNOSIS — G47.33 OSA (OBSTRUCTIVE SLEEP APNEA): Primary | ICD-10-CM

## 2018-11-29 PROCEDURE — 99213 OFFICE O/P EST LOW 20 MIN: CPT | Performed by: PHYSICIAN ASSISTANT

## 2018-11-29 NOTE — PROGRESS NOTES
Sleep Study Follow-Up Visit:    Date on this visit: 11/29/2018    Micki Santos comes in today for follow-up of her CPAP use for severe LEROY and hypoxemia. She was initially seen at the [facility] for fatigue for a couple of years. Her medical history is significant for morbid obesity, GERD, hypothyroidism, HTN, depression, joint pain. She was 19 minutes late today.   Her HST on 8/16/2018 shows and AHI of 55/hr with an O2 julisa of 67% and 146 minutes below 89%.     She is on auto CPAP 10-15 cm. She had a mask fitting appointment in mid October, but did not show to it.   She feels CPAP is going well overall. She is having mask issues in the last couple of weeks. She feels the cushion is softening up and she is getting a lot of leak at the corners of her mouth. She is using a full face mask. She has an AirFit F20 with the foam edge. She does still snort herself awake, but not as much as before. She has a lot less leg pain and numbness/cramping. Her energy is better. She has the humidifier all the way up and the tube temp all the way down. She likes cool air. She denies condensation. She likes the pressure starting high. She felt the pressure was suffocating when the pressure was starting at 6 cm.   She has an appointment to meet with [facility] next week.    The compliance data shows that she has used the CPAP for 30/30 nights, 73.3% of nights for >4 hours.  The 90th% pressure is 10.5 cm.  The average time in large leak is 3 hours 21 minutes.  The average nightly usage is 6:12.  The average AHI is 2.1/hr.    Past medical/surgical history, family history, social history, medications and allergies were reviewed.      Problem List:  Patient Active Problem List    Diagnosis Date Noted     LEROY (obstructive sleep apnea) 08/21/2018     Priority: Medium     Fatigue, unspecified type 08/01/2018     Priority: Medium     Morbid obesity, unspecified obesity type (H) 12/11/2015     Priority: Medium      Hypothyroidism 04/14/2015     Priority: Medium     Family history of skin cancer 08/14/2014     Priority: Medium     Multiple nevi 08/14/2014     Priority: Medium     Hypertension, goal below 140/90 01/07/2014     Priority: Medium     Pain in thoracic spine 02/27/2012     Priority: Medium     Mild major depression (H) 09/01/2011     Priority: Medium     Esophageal reflux 04/16/2004     Priority: Medium        Impression/Plan:    (G47.33) LEROY (obstructive sleep apnea)  (primary encounter diagnosis)  Comment: She has very high leak, but is otherwise feeling benefit from CPAP  Plan: Continue auto CPAP 10-15 cm. She will get a new mask. If she continues to have high leak, I may lower her starting pressure because there are rare instances when her mask appears to be fitting well and her pressure is at the lower limit without breathing events. I have asked her to send me a MyChart note before her follow up if she continues to notice high leak.   Her blood pressure was elevated. She attributes that in part to driving in traffic. She will check her blood pressure again when she gets to work and let me know if it is still high. She is asymptomatic (no headache, chest pain, shortness of breath). I am suspicious our reading was inaccurate.      She will follow up with me in about 2 month(s).     Fifteen minutes spent with patient, all of which were spent face-to-face counseling, consulting, coordinating plan of care.      Bennett Goltz, PA-C    CC: Miranda Bosch MD

## 2018-11-29 NOTE — PATIENT INSTRUCTIONS

## 2018-11-29 NOTE — MR AVS SNAPSHOT
After Visit Summary   11/29/2018    Micki Santos    MRN: [id number]           Patient Information     Date Of Birth          1960        Visit Information        Provider Department      11/29/2018 9:30 AM Goltz, Bennett Ezra, PA-C [facility]        Today's Diagnoses     LEROY (obstructive sleep apnea)    -  1      Care Instructions      Your BMI is Body mass index is 42.99 kg/(m^2).  Weight management is a personal decision.  If you are interested in exploring weight loss strategies, the following discussion covers the approaches that may be successful. Body mass index (BMI) is one way to tell whether you are at a healthy weight, overweight, or obese. It measures your weight in relation to your height.  A BMI of 18.5 to 24.9 is in the healthy range. A person with a BMI of 25 to 29.9 is considered overweight, and someone with a BMI of 30 or greater is considered obese. More than two-thirds of American adults are considered overweight or obese.  Being overweight or obese increases the risk for further weight gain. Excess weight may lead to heart disease and diabetes.  Creating and following plans for healthy eating and physical activity may help you improve your health.  Weight control is part of healthy lifestyle and includes exercise, emotional health, and healthy eating habits. Careful eating habits lifelong are the mainstay of weight control. Though there are significant health benefits from weight loss, long-term weight loss with diet alone may be very difficult to achieve- studies show long-term success with dietary management in less than 10% of people. Attaining a healthy weight may be especially difficult to achieve in those with severe obesity. In some cases, medications, devices and surgical management might be considered.  What can you do?  If you are overweight or obese and are interested in methods for weight loss, you should discuss this with your provider.      Consider reducing daily calorie intake by 500 calories.     Keep a food journal.     Avoiding skipping meals, consider cutting portions instead.    Diet combined with exercise helps maintain muscle while optimizing fat loss. Strength training is particularly important for building and maintaining muscle mass. Exercise helps reduce stress, increase energy, and improves fitness. Increasing exercise without diet control, however, may not burn enough calories to loose weight.       Start walking three days a week 10-20 minutes at a time    Work towards walking thirty minutes five days a week     Eventually, increase the speed of your walking for 1-2 minutes at time    In addition, we recommend that you review healthy lifestyles and methods for weight loss available through the National Institutes of Health patient information sites:  http://win.niddk.nih.gov/publications/index.htm    And look into health and wellness programs that may be available through your health insurance provider, employer, local community center, or hyun club.    Weight management plan: Patient was referred to their PCP to discuss a diet and exercise plan.              Follow-ups after your visit        Follow-up notes from your care team     Return in about 2 months (around 1/29/2019) for CPAP compliance recheck.      Your next 10 appointments already scheduled     Jan 31, 2019  3:00 PM CST   Return Sleep Patient with Bennett Ezra Goltz, PA-C   [facility] ([facility])    [address] [place] [id number] [phone]              Who to contact     If you have questions or need follow up information about today's clinic visit or your schedule please contact [facility] directly at [phone].  Normal or non-critical lab and imaging results will be communicated to you by MyChart, letter or phone within 4 business days after the clinic has received the results.  "If you do not hear from us within 7 days, please contact the clinic through [facility] or phone. If you have a critical or abnormal lab result, we will notify you by phone as soon as possible.  Submit refill requests through [facility] or call your pharmacy and they will forward the refill request to us. Please allow 3 business days for your refill to be completed.          Additional Information About Your Visit        [employer] Information     [facility] gives you secure access to your electronic health record. If you see a primary care provider, you can also send messages to your care team and make appointments. If you have questions, please call your primary care clinic.  If you do not have a primary care provider, please call [phone] and they will assist you.        Care EveryWhere ID     This is your Care EveryWhere ID. This could be used by other organizations to access your [place] medical records  [id number]        Your Vitals Were     Pulse Temperature Respirations Height Last Period Pulse Oximetry    58 98  F (36.7  C) (Oral) 16 1.614 m (5' 3.56\") 07/29/2003 98%    BMI (Body Mass Index)                   42.99 kg/m2            Blood Pressure from Last 3 Encounters:   11/29/18 (!) 161/114   11/20/18 130/84   08/13/18 132/82    Weight from Last 3 Encounters:   11/29/18 112 kg (247 lb)   11/20/18 112 kg (247 lb)   08/13/18 108.9 kg (240 lb)              Today, you had the following     No orders found for display       Primary Care Provider Office Phone # Fax #    Miranda F MD [place] [phone]       [address] [facility] [address]  [facility] [address]        Equal Access to Services     [facility] HospitalCHRISTOPHE : Hadii bridger Perez, robertda mary beth, qaybta nadjaalmegan garrett. So [facility] [phone].    ATENCIÓN: Si habla español, tiene a dumont disposición servicios gratuitos de asistencia lingüística. Llame al [phone].    We comply with applicable federal " civil rights laws and [place] laws. We do not discriminate on the basis of race, color, national origin, age, disability, sex, sexual orientation, or gender identity.            Thank you!     Thank you for choosing [facility]  for your care. Our goal is always to provide you with excellent care. Hearing back from our patients is one way we can continue to improve our services. Please take a few minutes to complete the written survey that you may receive in the mail after your visit with us. Thank you!             Your Updated Medication List - Protect others around you: Learn how to safely use, store and throw away your medicines at www.disposemymeds.org.          This list is accurate as of 11/29/18 10:16 AM.  Always use your most recent med list.                   Brand Name Dispense Instructions for use Diagnosis    atenolol 50 MG tablet    TENORMIN    90 tablet    Take 1 tablet (50 mg) by mouth daily    Essential hypertension with goal blood pressure less than 140/90       levothyroxine 137 MCG tablet    SYNTHROID/LEVOTHROID    90 tablet    Take 1 tablet (137 mcg) by mouth daily    Hypothyroidism, unspecified type       lisinopril 20 MG tablet    PRINIVIL/ZESTRIL    90 tablet    Take 1 tablet (20 mg) by mouth daily    Essential hypertension with goal blood pressure less than 140/90       mometasone 50 MCG/ACT nasal spray    NASONEX    1 Box    Spray 2 sprays into both nostrils daily    Chronic rhinitis, unspecified type       pantoprazole 40 MG EC tablet    PROTONIX    30 tablet    TAKE ONE TABLET BY MOUTH ONE TIME DAILY    Gastroesophageal reflux disease without esophagitis       sertraline 100 MG tablet    ZOLOFT    135 tablet    TAKE 1 & 1/2 TABLETS DAILY    Major depressive disorder, recurrent episode, mild (H)

## 2018-11-29 NOTE — NURSING NOTE
"Chief Complaint   Patient presents with     CPAP Follow Up     Follow up deepa        Initial /66  Pulse 58  Temp 98  F (36.7  C) (Oral)  Resp 16  Ht 1.614 m (5' 3.56\")  Wt 112 kg (247 lb)  LMP 07/29/2003  SpO2 98%  BMI 42.99 kg/m2 Estimated body mass index is 42.99 kg/(m^2) as calculated from the following:    Height as of this encounter: 1.614 m (5' 3.56\").    Weight as of this encounter: 112 kg (247 lb).    Medication Reconciliation: complete    ESS 3    Clarissa Taveras MA      "

## 2018-12-03 ENCOUNTER — MYC MEDICAL ADVICE (OUTPATIENT)
Dept: FAMILY MEDICINE | Facility: OTHER | Age: 58
End: 2018-12-03

## 2018-12-03 NOTE — PROGRESS NOTES
SUBJECTIVE:   Micki Santos is a 58 year old female who presents to clinic today for the following health issues:      HPI  Concern - palpitations  Onset: 2 months     Description:   Can feel heart beat jumping around    Intensity: moderate    Progression of Symptoms:  worsening    Accompanying Signs & Symptoms:  dizziness    Previous history of similar problem:   Yes, but not as frequent as this     Precipitating factors:   Worsened by: none, but is concerned that her Thyroid med could be contributing     Alleviating factors:  Improved by: none    Therapies Tried and outcome: none  Problem list and histories reviewed & adjusted, as indicated.  Additional history: as documented      Patient Active Problem List   Diagnosis     Esophageal reflux     Mild major depression (H)     Pain in thoracic spine     Hypertension, goal below 140/90     Family history of skin cancer     Multiple nevi     Hypothyroidism     Morbid obesity, unspecified obesity type (H)     Fatigue, unspecified type     LEROY (obstructive sleep apnea)     Cervical cancer screening     Past Surgical History:   Procedure Laterality Date     C REMOVAL OF OVARY(S)      left ovary removed-cyst, uterine fibroid removed     CHOLECYSTECTOMY, LAPOROSCOPIC  07/24/06    Cholecystectomy, Laparoscopic     ESOPHAGOSCOPY, GASTROSCOPY, DUODENOSCOPY (EGD), COMBINED N/A 2/5/2016    Procedure: COMBINED ESOPHAGOSCOPY, GASTROSCOPY, DUODENOSCOPY (EGD), BIOPSY SINGLE OR MULTIPLE;  Surgeon: Del Betts MD;  Location:  GI     GENITOURINARY SURGERY  1988    removal of left ovary with cyst and uterine fibroid      REMOVAL OF TONSILS,12+ Y/O  1977    Tonsils 11+y.o.     [facility] UGI ENDOSCOPY, SIMPLE EXAM  02/11/09       Social History   Substance Use Topics     Smoking status: Never Smoker     Smokeless tobacco: Never Used      Comment: No smokers in home     Alcohol use Yes      Comment: 1-2 glasses of wine/week     Family History   Problem Relation Age of Onset  "    MARIA LUISA Maternal Grandmother      DONYA. Maternal Grandfather      DONYA. Paternal Grandfather       age 38     Respiratory Mother      sarcoidosis     Hypertension Mother      chronic     Depression Mother      10+ years     Colon Cancer Mother      Diabetes Father      Type II diabetes     Coronary Artery Disease Father      Heart Attack with Angioplasty/Stent     Arthritis No family hx of            ROS:  CONSTITUTIONAL: NEGATIVE for fever, chills, change in weight  ENT/MOUTH: NEGATIVE for ear, mouth and throat problems  RESP: NEGATIVE for significant cough or SOB  CV: NEGATIVE for chest pain or peripheral edema    OBJECTIVE:     BP (!) 154/96 (BP Location: Left arm, Patient Position: Chair, Cuff Size: Adult Large)  Pulse 52  Temp 97.5  F (36.4  C) (Temporal)  Resp 16  Ht 5' 3.5\" (1.613 m)  Wt 250 lb (113.4 kg)  LMP 2003  SpO2 95%  BMI 43.59 kg/m2  Body mass index is 43.59 kg/(m^2).  Gen: no apparent distress  NECK: no adenopathy, no asymmetry, no masses  Chest: clear to auscultation without wheeze, rale or rhonchi  Cor: regular rate and rhythm without murmur  Ext: warm and dry without edema  Psych: Alert and oriented times 3; coherent speech, normal   rate and volume, able to articulate logical thoughts, able   to abstract reason, no tangential thoughts, no hallucinations   or delusions  Her affect is neutral     ASSESSMENT/PLAN:     BMI:   Estimated body mass index is 43.59 kg/(m^2) as calculated from the following:    Height as of this encounter: 5' 3.5\" (1.613 m).    Weight as of this encounter: 250 lb (113.4 kg).   Weight management plan: Discussed healthy diet and exercise guidelines      1. Palpitations  Suspect PACs/PVCs, will obtain labs and ZioPatch.    - EKG 12-lead complete w/read - Clinics  - TSH with free T4 reflex  - Comprehensive metabolic panel  - Magnesium  - Zio Patch Holter; Future    Miranda Bosch MD  [facility]  "

## 2018-12-03 NOTE — TELEPHONE ENCOUNTER
Next 5 appointments (look out 90 days)     Dec 04, 2018  9:20 AM CST   [facility]    [address]  [facility] [address]   [phone]

## 2018-12-03 NOTE — TELEPHONE ENCOUNTER
Appointment Request From: Micki Santos           With Provider: Miranda Bosch MD [-Primary Care Physician-]          Preferred Date Range: From 12/4/2018 To 12/4/2018          Preferred Times: Any          Reason for visit: Request an Appointment          Comments:     Need evaluation for irregular heartbeats over last month.  Contact office and was asked to schedule an appt for Tuesday 04DEC

## 2018-12-04 ENCOUNTER — OFFICE VISIT (OUTPATIENT)
Dept: FAMILY MEDICINE | Facility: OTHER | Age: 58
End: 2018-12-04
Payer: COMMERCIAL

## 2018-12-04 VITALS
HEART RATE: 52 BPM | RESPIRATION RATE: 16 BRPM | HEIGHT: 64 IN | DIASTOLIC BLOOD PRESSURE: 92 MMHG | BODY MASS INDEX: 42.68 KG/M2 | TEMPERATURE: 97.5 F | SYSTOLIC BLOOD PRESSURE: 152 MMHG | WEIGHT: 250 LBS | OXYGEN SATURATION: 95 %

## 2018-12-04 DIAGNOSIS — E03.9 HYPOTHYROIDISM, UNSPECIFIED TYPE: ICD-10-CM

## 2018-12-04 DIAGNOSIS — R00.2 PALPITATIONS: Primary | ICD-10-CM

## 2018-12-04 LAB
ALBUMIN SERPL-MCNC: 3.7 G/DL (ref 3.4–5)
ALP SERPL-CCNC: 116 U/L (ref 40–150)
ALT SERPL W P-5'-P-CCNC: 24 U/L (ref 0–50)
ANION GAP SERPL CALCULATED.3IONS-SCNC: 8 MMOL/L (ref 3–14)
AST SERPL W P-5'-P-CCNC: 14 U/L (ref 0–45)
BILIRUB SERPL-MCNC: 0.3 MG/DL (ref 0.2–1.3)
BUN SERPL-MCNC: 16 MG/DL (ref 7–30)
CALCIUM SERPL-MCNC: 8.8 MG/DL (ref 8.5–10.1)
CHLORIDE SERPL-SCNC: 109 MMOL/L (ref 94–109)
CO2 SERPL-SCNC: 23 MMOL/L (ref 20–32)
CREAT SERPL-MCNC: 0.88 MG/DL (ref 0.52–1.04)
GFR SERPL CREATININE-BSD FRML MDRD: 65 ML/MIN/1.7M2
GLUCOSE SERPL-MCNC: 83 MG/DL (ref 70–99)
MAGNESIUM SERPL-MCNC: 2.4 MG/DL (ref 1.6–2.3)
POTASSIUM SERPL-SCNC: 4.4 MMOL/L (ref 3.4–5.3)
PROT SERPL-MCNC: 7.6 G/DL (ref 6.8–8.8)
SODIUM SERPL-SCNC: 140 MMOL/L (ref 133–144)
T4 FREE SERPL-MCNC: 1.26 NG/DL (ref 0.76–1.46)
TSH SERPL DL<=0.005 MIU/L-ACNC: 4.39 MU/L (ref 0.4–4)

## 2018-12-04 PROCEDURE — 84439 ASSAY OF FREE THYROXINE: CPT | Performed by: FAMILY MEDICINE

## 2018-12-04 PROCEDURE — 93000 ELECTROCARDIOGRAM COMPLETE: CPT | Performed by: FAMILY MEDICINE

## 2018-12-04 PROCEDURE — 36415 COLL VENOUS BLD VENIPUNCTURE: CPT | Performed by: FAMILY MEDICINE

## 2018-12-04 PROCEDURE — 84443 ASSAY THYROID STIM HORMONE: CPT | Performed by: FAMILY MEDICINE

## 2018-12-04 PROCEDURE — 99214 OFFICE O/P EST MOD 30 MIN: CPT | Performed by: FAMILY MEDICINE

## 2018-12-04 PROCEDURE — 83735 ASSAY OF MAGNESIUM: CPT | Performed by: FAMILY MEDICINE

## 2018-12-04 PROCEDURE — 80053 COMPREHEN METABOLIC PANEL: CPT | Performed by: FAMILY MEDICINE

## 2018-12-04 NOTE — MR AVS SNAPSHOT
After Visit Summary   12/4/2018    Micki Santos    MRN: [id number]           Patient Information     Date Of Birth          1960        Visit Information        Provider Department      12/4/2018 9:20 AM Miranda Bosch MD [facility]        Today's Diagnoses     Palpitations    -  1       Follow-ups after your visit        Your next 10 appointments already scheduled     Dec 06, 2018  8:00 AM CST   ZIOPATCH MONITOR with MG DEVICE RM, MG CV TECH   [facility]    [address] [place] [address]   [phone]            Dec 14, 2018  1:00 PM CST   SLEEP DME MASK FITTING with BK SC DME   [facility] ([facility]    [address]  [facility] [id number] [phone]            Jan 31, 2019  3:00 PM CST   Return Sleep Patient with Bennett Ezra Goltz, PA-C   [facility] ([facility])    [address]  [facility] [address]   [phone]              Future tests that were ordered for you today     Open Future Orders        Priority Expected Expires Ordered    Zio Patch Holter Routine  1/18/2019 12/4/2018            Who to contact     If you have questions or need follow up information about today's clinic visit or your schedule please contact [facility] directly at [phone].  Normal or non-critical lab and imaging results will be communicated to you by MyChart, letter or phone within 4 business days after the clinic has received the results. If you do not hear from us within 7 days, please contact the clinic through MyChart or phone. If you have a critical or abnormal lab result, we will notify you by phone as soon as possible.  Submit refill requests through [employer] or call your pharmacy and they will forward the refill request to us. Please allow 3 business days for  "your refill to be completed.          Additional Information About Your Visit        MyChart Information     [employer] gives you secure access to your electronic health record. If you see a primary care provider, you can also send messages to your care team and make appointments. If you have questions, please call your primary care clinic.  If you do not have a primary care provider, please call [phone] and they will assist you.        Care EveryWhere ID     This is your Care EveryWhere ID. This could be used by other organizations to access your [facility] medical records  [id number]        Your Vitals Were     Pulse Temperature Respirations Height Last Period Pulse Oximetry    52 97.5  F (36.4  C) (Temporal) 16 5' 3.5\" (1.613 m) 07/29/2003 95%    BMI (Body Mass Index)                   43.59 kg/m2            Blood Pressure from Last 3 Encounters:   12/04/18 (!) 152/92   11/29/18 (!) 161/114   11/20/18 130/84    Weight from Last 3 Encounters:   12/04/18 250 lb (113.4 kg)   11/29/18 247 lb (112 kg)   11/20/18 247 lb (112 kg)              We Performed the Following     Comprehensive metabolic panel     EKG 12-lead complete w/read - Clinics     Magnesium     TSH with free T4 reflex        Primary Care Provider Office Phone # Fax #    Miranda WHITE MD [facility] [phone]       [address]  [facility] [address]        Equal Access to Services     [facility]: Hadii aad ku hadasho Soomaali, waaxda luqadaha, qaybta kaalmada adeegyada, waxay cande ravi . So [facility] [phone].    ATENCIÓN: Si habla español, tiene a dumont disposición servicios gratuitos de asistencia lingüística. Llame al [phone].    We comply with applicable federal civil rights laws and [place] laws. We do not discriminate on the basis of race, color, national origin, age, disability, sex, sexual orientation, or gender identity.            Thank you!     Thank you for choosing [facility]  for your care. Our goal is always to provide you with excellent care. Hearing back from our patients is one way we can continue to improve our services. Please take a few minutes to complete the written survey that you may receive in the mail after your visit with us. Thank you!             Your Updated Medication List - Protect others around you: Learn how to safely use, store and throw away your medicines at www.disposemymeds.org.          This list is accurate as of 12/4/18  9:58 AM.  Always use your most recent med list.                   Brand Name Dispense Instructions for use Diagnosis    atenolol 50 MG tablet    TENORMIN    90 tablet    Take 1 tablet (50 mg) by mouth daily    Essential hypertension with goal blood pressure less than 140/90       levothyroxine 137 MCG tablet    SYNTHROID/LEVOTHROID    90 tablet    Take 1 tablet (137 mcg) by mouth daily    Hypothyroidism, unspecified type       lisinopril 20 MG tablet    PRINIVIL/ZESTRIL    90 tablet    Take 1 tablet (20 mg) by mouth daily    Essential hypertension with goal blood pressure less than 140/90       mometasone 50 MCG/ACT nasal spray    NASONEX    1 Box    Spray 2 sprays into both nostrils daily    Chronic rhinitis, unspecified type       pantoprazole 40 MG EC tablet    PROTONIX    30 tablet    TAKE ONE TABLET BY MOUTH ONE TIME DAILY    Gastroesophageal reflux disease without esophagitis       sertraline 100 MG tablet    ZOLOFT    135 tablet    TAKE 1 & 1/2 TABLETS DAILY    Major depressive disorder, recurrent episode, mild (H)

## 2018-12-06 ENCOUNTER — ALLIED HEALTH/NURSE VISIT (OUTPATIENT)
Dept: CARDIOLOGY | Facility: CLINIC | Age: 58
End: 2018-12-06
Attending: FAMILY MEDICINE
Payer: COMMERCIAL

## 2018-12-06 DIAGNOSIS — R00.2 PALPITATIONS: ICD-10-CM

## 2018-12-12 ENCOUNTER — ANCILLARY PROCEDURE (OUTPATIENT)
Dept: CT IMAGING | Facility: CLINIC | Age: 58
End: 2018-12-12
Payer: COMMERCIAL

## 2018-12-12 DIAGNOSIS — Z13.6 SCREENING FOR HEART DISEASE: ICD-10-CM

## 2018-12-12 PROCEDURE — 75571 CT HRT W/O DYE W/CA TEST: CPT | Mod: GY

## 2018-12-14 ENCOUNTER — TELEPHONE (OUTPATIENT)
Dept: GENERAL RADIOLOGY | Facility: CLINIC | Age: 58
End: 2018-12-14

## 2018-12-14 ENCOUNTER — DOCUMENTATION ONLY (OUTPATIENT)
Dept: SLEEP MEDICINE | Facility: CLINIC | Age: 58
End: 2018-12-14
Payer: COMMERCIAL

## 2018-12-14 DIAGNOSIS — G47.33 OSA (OBSTRUCTIVE SLEEP APNEA): ICD-10-CM

## 2018-12-14 NOTE — PROGRESS NOTES
Patient came to [facility] for mask fitting appointment on December 14, 2018. Patient requested to switch masks because poor seal/leak. Patient tried on and selected the following mask: [employer] Simplus Full Face mask size Medium.

## 2018-12-14 NOTE — TELEPHONE ENCOUNTER
Called pt with results of CT calcium score. No answer. Left detailed message regarding results of CT calcium score and lung portion of CT. Contact number provided in case of questions or concerns.

## 2018-12-19 DIAGNOSIS — I10 ESSENTIAL HYPERTENSION WITH GOAL BLOOD PRESSURE LESS THAN 140/90: ICD-10-CM

## 2018-12-19 DIAGNOSIS — E03.9 HYPOTHYROIDISM, UNSPECIFIED TYPE: ICD-10-CM

## 2018-12-19 NOTE — TELEPHONE ENCOUNTER
----- Message from Miranda Bosch MD sent at 12/19/2018  2:55 PM CST -----  Holter monitor reveals some extra beats, nothing that lasts very long.  I don't think she needs further evaluation, unless she is finding these very bothersome, in that case I could refer her to Cardiology--EP.    Electronically signed by Miranda Bosch MD on 12/19/2018

## 2018-12-20 RX ORDER — ATENOLOL 50 MG/1
50 TABLET ORAL DAILY
Qty: 30 TABLET | Refills: 0 | Status: SHIPPED | OUTPATIENT
Start: 2018-12-20 | End: 2019-02-05

## 2018-12-20 RX ORDER — LEVOTHYROXINE SODIUM 137 UG/1
137 TABLET ORAL DAILY
Qty: 30 TABLET | Refills: 1 | Status: SHIPPED | OUTPATIENT
Start: 2018-12-20 | End: 2019-03-13

## 2018-12-20 NOTE — TELEPHONE ENCOUNTER
Patient informed of both messages below. She will call back to schedule or will stop by the pharmacy  Farzana Mathis CMA

## 2018-12-20 NOTE — TELEPHONE ENCOUNTER
Levothyroxine     Prescription approved per [facility] Refill Protocol. Needs to recheck level in a couple months    Atenolol    BP Readings from Last 3 Encounters:   12/04/18 (!) 152/92   11/29/18 (!) 161/114   11/20/18 130/84     Routing refill request to provider for review/approval because:  Labs out of range:  B/P    *result note is also in this encounter, a message has been left for pt but they have not received results yet, see below.    Ana Styles, RN, BSN

## 2019-01-08 DIAGNOSIS — Z12.11 SCREEN FOR COLON CANCER: ICD-10-CM

## 2019-01-08 LAB — HEMOCCULT STL QL IA: NEGATIVE

## 2019-01-08 PROCEDURE — 82274 ASSAY TEST FOR BLOOD FECAL: CPT | Performed by: FAMILY MEDICINE

## 2019-01-31 ENCOUNTER — VIRTUAL VISIT (OUTPATIENT)
Dept: SLEEP MEDICINE | Facility: CLINIC | Age: 59
End: 2019-01-31
Payer: COMMERCIAL

## 2019-01-31 DIAGNOSIS — G47.33 OSA (OBSTRUCTIVE SLEEP APNEA): Primary | ICD-10-CM

## 2019-01-31 PROCEDURE — 98966 PH1 ASSMT&MGMT NQHP 5-10: CPT | Performed by: PHYSICIAN ASSISTANT

## 2019-01-31 NOTE — PROGRESS NOTES
Virtual CPAP Follow-Up Visit:    Date on this visit: 1/31/2019    Micki Santos has a phone visit today for follow-up of her CPAP use for severe LEROY and hypoxemia. She was initially seen at the [facility] for fatigue for a couple of years. Her medical history is significant for morbid obesity, GERD, hypothyroidism, HTN, depression, joint pain. She was 19 minutes late today.   Her HST on 8/16/2018 shows and AHI of 55/hr with an O2 julisa of 67% and 146 minutes below 89%.      She is on auto CPAP 9-15 cm. She feels things are going better. She got a new mask about 5 weeks ago. She does not think she is getting as much leak. She gets mask leak mostly at the corners of her mouth because her jaw drops when she goes to sleep. With her new mask, she finds it leaks less there but it leaks more at the bridge of her nose. She has not been using the humidifier. She likes cooler air. She was in [place] for 5 weeks and just got home recently. She sleeps laterally.  She does feel much better with CPAP.   The compliance data shows that s/he has used the CPAP for 30/30 nights, 100% of nights for >4 hours.  The 90th% pressure is 10.5 cm.  The average time in large leak is 3 hours 10 min.  The average nightly usage is 8:58.  The average AHI is 1.8/hr.      Past medical/surgical history, family history, social history, medications and allergies were reviewed.      Problem List:  Patient Active Problem List    Diagnosis Date Noted     LEROY (obstructive sleep apnea) 08/21/2018     Priority: Medium     Cervical cancer screening 08/16/2018     Priority: Medium     Overview:   8- office note documents no hx of abnormal pap tests  2006, 2009, 2011 NILM  2018 NILM; HPV Negative   57 y.o.  Plan: pap and co-testing 8/2023       Fatigue, unspecified type 08/01/2018     Priority: Medium     Morbid obesity, unspecified obesity type (H) 12/11/2015     Priority: Medium     Hypothyroidism 04/14/2015     Priority: Medium      Family history of skin cancer 08/14/2014     Priority: Medium     Multiple nevi 08/14/2014     Priority: Medium     Hypertension, goal below 140/90 01/07/2014     Priority: Medium     Pain in thoracic spine 02/27/2012     Priority: Medium     Mild major depression (H) 09/01/2011     Priority: Medium     Esophageal reflux 04/16/2004     Priority: Medium        Impression/Plan:    (G47.33) LEROY (obstructive sleep apnea)  (primary encounter diagnosis)  Comment: Things seem to be going well aside from leak.  Plan: Continue auto CPAP 9-15 cm. She was advised to look at CPAP pillows online and meet with [facility] again when due for a new mask. Contact me when on a new mask for a month and I will look at a download.      She will follow up with me in about 1 year(s).     Ten minutes spent with patient, all of which were spent counseling, consulting, coordinating plan of care.      Bennett Goltz, PA-C    CC: No ref. provider found

## 2019-02-04 ENCOUNTER — MYC MEDICAL ADVICE (OUTPATIENT)
Dept: FAMILY MEDICINE | Facility: OTHER | Age: 59
End: 2019-02-04

## 2019-02-04 DIAGNOSIS — I10 ESSENTIAL HYPERTENSION WITH GOAL BLOOD PRESSURE LESS THAN 140/90: ICD-10-CM

## 2019-02-04 DIAGNOSIS — F33.0 MAJOR DEPRESSIVE DISORDER, RECURRENT EPISODE, MILD (H): ICD-10-CM

## 2019-02-04 NOTE — TELEPHONE ENCOUNTER
"Requested Prescriptions   Pending Prescriptions Disp Refills     sertraline (ZOLOFT) 100 MG tablet  Last Written Prescription Date:  5/14/18  Last Fill Quantity: 135,  # refills: 1   Last office visit: 12/4/2018 with prescribing provider:  [facility]   Future Office Visit:     135 tablet 1     Sig: TAKE 1 & 1/2 TABLETS DAILY    SSRIs Protocol Passed - 2/4/2019  1:39 PM       Passed - PHQ-9 score less than 5 in past 6 months    Please review last PHQ-9 score.          Passed - Medication is active on med list       Passed - Patient is age 18 or older       Passed - No active pregnancy on record       Passed - No positive pregnancy test in last 12 months       Passed - Recent (6 mo) or future (30 days) visit within the authorizing provider's specialty    Patient had office visit in the last 6 months or has a visit in the next 30 days with authorizing provider or within the authorizing provider's specialty.  See \"Patient Info\" tab in inbasket, or \"Choose Columns\" in Meds & Orders section of the refill encounter.              "

## 2019-02-04 NOTE — TELEPHONE ENCOUNTER
Zoloft  Routing refill request to provider for review/approval because:  Appears to be a break in medication - should have been out around 11/2018    Neha Guardado, RN, BSN

## 2019-02-05 RX ORDER — ATENOLOL 50 MG/1
50 TABLET ORAL DAILY
Qty: 30 TABLET | Refills: 0 | Status: SHIPPED | OUTPATIENT
Start: 2019-02-05 | End: 2019-03-13

## 2019-02-05 RX ORDER — LISINOPRIL 20 MG/1
20 TABLET ORAL DAILY
Qty: 30 TABLET | Refills: 0 | Status: SHIPPED | OUTPATIENT
Start: 2019-02-05 | End: 2019-04-30

## 2019-02-05 RX ORDER — SERTRALINE HYDROCHLORIDE 100 MG/1
TABLET, FILM COATED ORAL
Qty: 135 TABLET | Refills: 1 | Status: SHIPPED | OUTPATIENT
Start: 2019-02-05 | End: 2019-04-30

## 2019-02-05 NOTE — TELEPHONE ENCOUNTER
"Requested Prescriptions   Pending Prescriptions Disp Refills     atenolol (TENORMIN) 50 MG tablet 30 tablet 0     Sig: Take 1 tablet (50 mg) by mouth daily    Beta-Blockers Protocol Failed - 2/4/2019 10:23 AM       Failed - Blood pressure under 140/90 in past 12 months    BP Readings from Last 3 Encounters:   12/04/18 (!) 152/92   11/29/18 (!) 161/114   11/20/18 130/84          Passed - Patient is age 6 or older       Passed - Recent (12 mo) or future (30 days) visit within the authorizing provider's specialty    Patient had office visit in the last 12 months or has a visit in the next 30 days with authorizing provider or within the authorizing provider's specialty.  See \"Patient Info\" tab in inbasket, or \"Choose Columns\" in Meds & Orders section of the refill encounter.           Passed - Medication is active on med list        lisinopril (PRINIVIL/ZESTRIL) 20 MG tablet 90 tablet 1     Sig: Take 1 tablet (20 mg) by mouth daily    ACE Inhibitors (Including Combos) Protocol Failed - 2/4/2019 10:23 AM       Failed - Blood pressure under 140/90 in past 12 months    BP Readings from Last 3 Encounters:   12/04/18 (!) 152/92   11/29/18 (!) 161/114   11/20/18 130/84          Passed - Recent (12 mo) or future (30 days) visit within the authorizing provider's specialty    Patient had office visit in the last 12 months or has a visit in the next 30 days with authorizing provider or within the authorizing provider's specialty.  See \"Patient Info\" tab in inbasket, or \"Choose Columns\" in Meds & Orders section of the refill encounter.           Passed - Medication is active on med list       Passed - Patient is age 18 or older       Passed - No active pregnancy on record       Passed - Normal serum creatinine on file in past 12 months    Recent Labs   Lab Test 12/04/18  1007   CR 0.88          Passed - Normal serum potassium on file in past 12 months    Recent Labs   Lab Test 12/04/18  1007   POTASSIUM 4.4          Passed - No " positive pregnancy test within past 12 months      Last ov 12/04/2018  Last filled 05/14/2018 and 12/20/2018    Medication is being filled for 1 time refill only due to:  Patient needs to be seen because for float visit for BP check.   Please call and help schedule.        .

## 2019-02-05 NOTE — TELEPHONE ENCOUNTER
Per patient's my chart message, she will be stopping at the [facility] pharmacy for BP checks.

## 2019-03-13 DIAGNOSIS — I10 ESSENTIAL HYPERTENSION WITH GOAL BLOOD PRESSURE LESS THAN 140/90: ICD-10-CM

## 2019-03-13 DIAGNOSIS — E03.9 HYPOTHYROIDISM, UNSPECIFIED TYPE: ICD-10-CM

## 2019-03-14 ENCOUNTER — DOCUMENTATION ONLY (OUTPATIENT)
Dept: SLEEP MEDICINE | Facility: CLINIC | Age: 59
End: 2019-03-14

## 2019-03-14 DIAGNOSIS — G47.33 OSA (OBSTRUCTIVE SLEEP APNEA): ICD-10-CM

## 2019-03-14 NOTE — TELEPHONE ENCOUNTER
Pending Prescriptions:                       Disp   Refills    atenolol (TENORMIN) 50 MG tablet [Pharmac*30 tab*0            Sig: Take 1 tablet (50 mg) by mouth daily    levothyroxine (SYNTHROID/LEVOTHROID) 137 *30 tab*0            Sig: Take 1 tablet (137 mcg) by mouth daily    BP Readings from Last 3 Encounters:   12/04/18 (!) 152/92   11/29/18 (!) 161/114   11/20/18 130/84     TSH   Date Value Ref Range Status   12/04/2018 4.39 (H) 0.40 - 4.00 mU/L Final     RN unable to approve with recent lab and BP values out of normal range. Routing for PCP review.    Jaguar Goldberg, RN, BSN

## 2019-03-14 NOTE — PROGRESS NOTES
6 Month STM visit    Diagnostic AHI:   50.8  HST    Data only recheck     Assessment: Pt meeting objective benchmarks.     Action plan:   pt to follow up per provider request    Device type: Auto-CPAP  PAP settings 9-15 cm H20     Objective measures: 14 day rolling measures         Compliance  100 %     % of night spent in large leak  6.3 % last  upload      AHI 1.8  last  upload               Objective measure goal  Compliance   Goal >70%  Leak   Goal < 10%  AHI  Goal < 5  Usage  Goal >240

## 2019-03-17 RX ORDER — ATENOLOL 50 MG/1
50 TABLET ORAL DAILY
Qty: 30 TABLET | Refills: 0 | Status: SHIPPED | OUTPATIENT
Start: 2019-03-17 | End: 2019-04-30

## 2019-03-17 RX ORDER — LEVOTHYROXINE SODIUM 137 UG/1
137 TABLET ORAL DAILY
Qty: 30 TABLET | Refills: 0 | Status: SHIPPED | OUTPATIENT
Start: 2019-03-17 | End: 2019-04-30

## 2019-03-17 NOTE — TELEPHONE ENCOUNTER
She needs an appointment to recheck her blood pressure and is due for thyroid labs.  Will give 1 month to allow her to get in for appointment.

## 2019-03-18 NOTE — TELEPHONE ENCOUNTER
LM for patient to return phone call to clinic about message below.  Mirta Blanco CMA ([facility])

## 2019-03-19 NOTE — PROGRESS NOTES
SUBJECTIVE:   Micki Santos is a 58 year old female who presents to clinic today for the following health issues:      HPI     Acute Illness   Acute illness concerns: Sinus  Onset: x 2 weeks    Fever: YES- low grade    Chills/Sweats: YES    Headache (location?): YES    Sinus Pressure:YES    Conjunctivitis:  no    Ear Pain: YES: both    Rhinorrhea: no    Congestion: YES    Sore Throat: no     Cough: YES-productive of clear sputum, with shortness of breath    Wheeze: YES    Decreased Appetite: YES    Nausea: YES    Vomiting: YES    Diarrhea:  YES    Dysuria/Freq.: no    Fatigue/Achiness: YES    Sick/Strep Exposure: no     Therapies Tried and outcome: Dayquil, Nyquil, Ibuprofen    Problem list and histories reviewed & adjusted, as indicated.  Additional history: as documented        Patient Active Problem List   Diagnosis     Esophageal reflux     Mild major depression (H)     Pain in thoracic spine     Hypertension, goal below 140/90     Family history of skin cancer     Multiple nevi     Hypothyroidism     Morbid obesity, unspecified obesity type (H)     Fatigue, unspecified type     LEROY (obstructive sleep apnea)     Cervical cancer screening     Past Surgical History:   Procedure Laterality Date     C REMOVAL OF OVARY(S)      left ovary removed-cyst, uterine fibroid removed     CHOLECYSTECTOMY, LAPOROSCOPIC  07/24/06    Cholecystectomy, Laparoscopic     ESOPHAGOSCOPY, GASTROSCOPY, DUODENOSCOPY (EGD), COMBINED N/A 2/5/2016    Procedure: COMBINED ESOPHAGOSCOPY, GASTROSCOPY, DUODENOSCOPY (EGD), BIOPSY SINGLE OR MULTIPLE;  Surgeon: Del Betts MD;  Location:  GI     GENITOURINARY SURGERY  1988    removal of left ovary with cyst and uterine fibroid     HC REMOVAL OF TONSILS,12+ Y/O  1977    Tonsils 11+y.o.      UGI ENDOSCOPY, SIMPLE EXAM  02/11/09       Social History     Tobacco Use     Smoking status: Never Smoker     Smokeless tobacco: Never Used     Tobacco comment: No smokers in home   Substance  Use Topics     Alcohol use: Yes     Comment: 1-2 glasses of wine/week     Family History   Problem Relation Age of Onset     C.A.D. Maternal Grandmother      DONYA. Maternal Grandfather      CYeniferABOONE. Paternal Grandfather          age 38     Respiratory Mother         sarcoidosis     Hypertension Mother         chronic     Depression Mother         10+ years     Colon Cancer Mother      Diabetes Father         Type II diabetes     Coronary Artery Disease Father         Heart Attack with Angioplasty/Stent     Arthritis No family hx of          Current Outpatient Medications   Medication Sig Dispense Refill     amoxicillin-clavulanate (AUGMENTIN) 875-125 MG tablet Take 1 tablet by mouth 2 times daily 20 tablet 0     atenolol (TENORMIN) 50 MG tablet Take 1 tablet (50 mg) by mouth daily 30 tablet 0     levothyroxine (SYNTHROID/LEVOTHROID) 137 MCG tablet Take 1 tablet (137 mcg) by mouth daily 30 tablet 0     lisinopril (PRINIVIL/ZESTRIL) 20 MG tablet Take 1 tablet (20 mg) by mouth daily 30 tablet 0     mometasone (NASONEX) 50 MCG/ACT spray Spray 2 sprays into both nostrils daily 1 Box 11     pantoprazole (PROTONIX) 40 MG EC tablet TAKE ONE TABLET BY MOUTH ONE TIME DAILY  30 tablet 10     sertraline (ZOLOFT) 100 MG tablet TAKE 1 & 1/2 TABLETS DAILY 135 tablet 1     Allergies   Allergen Reactions     No Known Drug Allergies      Recent Labs   Lab Test 18  1007 18  1111  18  1648 10/11/16  1103   LDL  --   --   --   --  178*   HDL  --   --   --   --  59   TRIG  --   --   --   --  200*   ALT 24 22  --  28  --    CR 0.88 0.80  --  0.88 0.88   GFRESTIMATED 65 73  --  66 66   GFRESTBLACK 79 89  --  80 80   POTASSIUM 4.4 4.5  --  4.6 4.8   TSH 4.39* 0.17*   < > 9.02* 4.80*    < > = values in this interval not displayed.      BP Readings from Last 3 Encounters:   19 148/90   18 (!) 152/92   18 (!) 161/114    Wt Readings from Last 3 Encounters:   19 112.9 kg (249 lb)   18 113.4 kg  "(250 lb)   11/29/18 112 kg (247 lb)                  Labs reviewed in EPIC    ROS:  Constitutional, HEENT, cardiovascular, pulmonary, gi and gu systems are negative, except as otherwise noted.    OBJECTIVE:     /90 (BP Location: Right arm, Patient Position: Chair, Cuff Size: Adult Large)   Pulse 69   Temp 99.1  F (37.3  C) (Temporal)   Resp 16   Ht 1.613 m (5' 3.5\")   Wt 112.9 kg (249 lb)   LMP 07/29/2003   SpO2 96%   BMI 43.42 kg/m    Body mass index is 43.42 kg/m .   Physical Exam   Constitutional: She appears well-developed and well-nourished.   HENT:   Head: Normocephalic and atraumatic.   Right Ear: External ear normal.   Left Ear: External ear normal.   Nose: Nose normal.   Mouth/Throat: Oropharynx is clear and moist. No oropharyngeal exudate.   Sinus tenderness   Cardiovascular: Normal rate and regular rhythm.   Pulmonary/Chest: Effort normal and breath sounds normal.   Psychiatric: She has a normal mood and affect. Her behavior is normal. Judgment and thought content normal.         Diagnostic Test Results:  none     ASSESSMENT/PLAN:     Problem List Items Addressed This Visit     None      Visit Diagnoses     Maxillary sinusitis, unspecified chronicity    -  Primary    Relevant Medications    amoxicillin-clavulanate (AUGMENTIN) 875-125 MG tablet    Screen for colon cancer        Relevant Orders    GASTROENTEROLOGY ADULT REF PROCEDURE ONLY    Cough        Relevant Orders    XR Chest 2 Views (Completed)    Major depressive disorder, recurrent episode, mild (H)        BMI 40.0-44.9, adult (H)          Antibiotics as prescribed for sinusitis  Discussed home care  Reportable signs and symptoms discussed  RTC if symptoms persist or fail to improve      Era Bedolla MD  [facility]  "

## 2019-03-19 NOTE — TELEPHONE ENCOUNTER
LM for patient to return phone call to clinic about message below.  Easydiagnosishart message sent.  Mirta Blanco CMA ([facility])

## 2019-03-21 ENCOUNTER — OFFICE VISIT (OUTPATIENT)
Dept: FAMILY MEDICINE | Facility: OTHER | Age: 59
End: 2019-03-21
Payer: COMMERCIAL

## 2019-03-21 ENCOUNTER — ANCILLARY PROCEDURE (OUTPATIENT)
Dept: GENERAL RADIOLOGY | Facility: OTHER | Age: 59
End: 2019-03-21
Attending: FAMILY MEDICINE
Payer: COMMERCIAL

## 2019-03-21 ENCOUNTER — TELEPHONE (OUTPATIENT)
Dept: FAMILY MEDICINE | Facility: OTHER | Age: 59
End: 2019-03-21

## 2019-03-21 VITALS
OXYGEN SATURATION: 96 % | BODY MASS INDEX: 42.51 KG/M2 | TEMPERATURE: 99.1 F | HEIGHT: 64 IN | WEIGHT: 249 LBS | SYSTOLIC BLOOD PRESSURE: 148 MMHG | HEART RATE: 69 BPM | RESPIRATION RATE: 16 BRPM | DIASTOLIC BLOOD PRESSURE: 90 MMHG

## 2019-03-21 DIAGNOSIS — Z12.11 SCREEN FOR COLON CANCER: ICD-10-CM

## 2019-03-21 DIAGNOSIS — J32.0 MAXILLARY SINUSITIS, UNSPECIFIED CHRONICITY: Primary | ICD-10-CM

## 2019-03-21 DIAGNOSIS — R05.9 COUGH: ICD-10-CM

## 2019-03-21 DIAGNOSIS — F33.0 MAJOR DEPRESSIVE DISORDER, RECURRENT EPISODE, MILD (H): ICD-10-CM

## 2019-03-21 PROCEDURE — 71046 X-RAY EXAM CHEST 2 VIEWS: CPT

## 2019-03-21 PROCEDURE — 99213 OFFICE O/P EST LOW 20 MIN: CPT | Performed by: FAMILY MEDICINE

## 2019-03-21 ASSESSMENT — MIFFLIN-ST. JEOR: SCORE: 1681.52

## 2019-03-21 ASSESSMENT — PAIN SCALES - GENERAL: PAINLEVEL: SEVERE PAIN (7)

## 2019-03-21 NOTE — TELEPHONE ENCOUNTER
----- Message from Era Bedolla MD sent at 3/21/2019  4:18 PM CDT -----  Please inform patient that the x-ray did not show any signs of pneumonia.

## 2019-03-22 ENCOUNTER — TELEPHONE (OUTPATIENT)
Dept: FAMILY MEDICINE | Facility: OTHER | Age: 59
End: 2019-03-22

## 2019-03-22 NOTE — LETTER
[facility] [address] [address]  [phone]      March 26, 2019      Micki Santos  [address] [facility] [address]      Dear Micki:     To better serve you, we are sending this letter to notify you that we have attempted to contact you by telephone to schedule the following procedure(s) ordered by your physician.     ____X___   Colonoscopy   _______   Upper GI Endoscopy (EGD)   _______   Colonoscopy and Upper GI Endoscopy    To provide the highest quality of care, we strongly encourage you to call and schedule the prescribed test/procedure at your earliest convenience.   The number to the Specialty Scheduling department is [phone] and the hours are 8:00am - 4:30pm Monday through Friday.   We look forward to hearing from you.    Sincerely,    [facility]

## 2019-03-22 NOTE — TELEPHONE ENCOUNTER
Left message for patient to return call to schedule EGD/colonoscopy. If Mikki or Yusra are not available, please transfer to same day surgery

## 2019-04-10 ENCOUNTER — TELEPHONE (OUTPATIENT)
Dept: FAMILY MEDICINE | Facility: OTHER | Age: 59
End: 2019-04-10

## 2019-04-10 NOTE — TELEPHONE ENCOUNTER
Panel Management Review    Summary:    Patient is due/failing the following:   TSH, COLONOSCOPY and PHYSICAL    Action needed:   Patient needs office visit for Physical, needs non-fasting lab only appointment and referral/order: Colonoscopy    Type of outreach:    Phone, left message for patient to call back.     Questions for provider review:    None                                                                                                                                    Mirta Blanco CMA ([facility])       Chart routed to Care Team .    Patient has the following on her problem list:     Depression / Dysthymia review    Measure:  Needs PHQ-9 score of 4 or less during index window.  Administer PHQ-9 and if score is 5 or more, send encounter to provider for next steps.    5 - 7 month window range:     PHQ-9 SCORE 5/14/2018 7/30/2018 11/19/2018   PHQ-9 Total Score - - -   PHQ-9 Total Score MyChart 11 (Moderate depression) - 1 (Minimal depression)   PHQ-9 Total Score 11 9 1       If PHQ-9 recheck is 5 or more, route to provider for next steps.    Patient is due for:  None    Hypertension   Last three blood pressure readings:  BP Readings from Last 3 Encounters:   03/21/19 148/90   12/04/18 (!) 152/92   11/29/18 (!) 161/114     Blood pressure: MONITOR    HTN Guidelines:  Age 18-59 BP range:  Less than 140/90  Age 60-85 with Diabetes:  Less than 140/90  Age 60-85 without Diabetes:  less than 150/90      Composite cancer screening  Chart review shows that this patient is due/due soon for the following Colonoscopy

## 2019-04-10 NOTE — LETTER
[facility]  [address]   [place] [address]  Phone: [phone]  April 11, 2019      Micki Santos  [address] [facility] [address]      Dear Micki,    We care about your health and have reviewed your health plan including your medical conditions, medications, and lab results.  Based on this review, it is recommended that you follow up regarding the following health topic(s):  -Depression  -High Blood Pressure  -Wellness (Physical) Visit     We recommend you take the following action(s):  -schedule a WELLNESS (Physical) APPOINTMENT.  We will perform the following labs: TSH (thyroid test).  -schedule a COLONOSCOPY to look for colon cancer (due every 10 years or 5 years in higher risk situations.)  Colonoscopies can prevent 90-95% of colon cancer deaths.  Problem lesions can be removed before they ever become cancer.  If you do not wish to do a colonoscopy or cannot afford to do one at this time, there is another option called a Fecal Immunochemical Occult Blood Test (FIT) a take home stool sample kit.  It does not replace the colonoscopy for colorectal cancer screening, but it can detect hidden bleeding in the lower colon.  It does need to be repeated every year and if a positive result is obtained, you would be referred for a colonoscopy.  If you have completed either one of these tests at another facility, please have the records sent to our clinic for our records.     Please call us at the [facility] - [phone] (or use [facility]) to address the above recommendations.     Thank you for trusting [facility] and we appreciate the opportunity to serve you.  We look forward to supporting your healthcare needs in the future.    Healthy Regards,    Your Health Care Team  [facility]

## 2019-04-11 NOTE — TELEPHONE ENCOUNTER
LM for patient to return phone call to clinic about message below.  Letter sent.  Mirta Blanco CMA ([facility])

## 2019-04-12 NOTE — TELEPHONE ENCOUNTER
Date of colonoscopy/EGD: 4/25  Surgeon: Dr. Morales  Prep:Miralax  Packet:Colonoscopy/EGD instructions mailed to patient's home address.   Date: 4/12/2019      Surgery Scheduler

## 2019-04-25 ENCOUNTER — HOSPITAL ENCOUNTER (OUTPATIENT)
Facility: CLINIC | Age: 59
Discharge: HOME | End: 2019-04-25
Attending: SURGERY | Admitting: SURGERY
Payer: COMMERCIAL

## 2019-04-25 ENCOUNTER — ANESTHESIA EVENT (OUTPATIENT)
Dept: GASTROENTEROLOGY | Facility: CLINIC | Age: 59
End: 2019-04-25
Payer: COMMERCIAL

## 2019-04-25 ENCOUNTER — ANESTHESIA (OUTPATIENT)
Dept: GASTROENTEROLOGY | Facility: CLINIC | Age: 59
End: 2019-04-25
Payer: COMMERCIAL

## 2019-04-25 VITALS
RESPIRATION RATE: 18 BRPM | TEMPERATURE: 97.6 F | DIASTOLIC BLOOD PRESSURE: 80 MMHG | HEART RATE: 49 BPM | OXYGEN SATURATION: 96 % | SYSTOLIC BLOOD PRESSURE: 154 MMHG

## 2019-04-25 LAB — COLONOSCOPY: NORMAL

## 2019-04-25 PROCEDURE — 37000009 ZZH ANESTHESIA TECHNICAL FEE, EACH ADDTL 15 MIN: Performed by: SURGERY

## 2019-04-25 PROCEDURE — 25000128 H RX IP 250 OP 636: Performed by: NURSE ANESTHETIST, CERTIFIED REGISTERED

## 2019-04-25 PROCEDURE — 45380 COLONOSCOPY AND BIOPSY: CPT | Performed by: SURGERY

## 2019-04-25 PROCEDURE — 25000125 ZZHC RX 250: Performed by: NURSE ANESTHETIST, CERTIFIED REGISTERED

## 2019-04-25 PROCEDURE — 40000296 ZZH STATISTIC ENDO RECOVERY CLASS 1:2 FIRST HOUR: Performed by: SURGERY

## 2019-04-25 PROCEDURE — 45380 COLONOSCOPY AND BIOPSY: CPT | Mod: PT | Performed by: SURGERY

## 2019-04-25 PROCEDURE — 88305 TISSUE EXAM BY PATHOLOGIST: CPT | Performed by: SURGERY

## 2019-04-25 PROCEDURE — 37000008 ZZH ANESTHESIA TECHNICAL FEE, 1ST 30 MIN: Performed by: SURGERY

## 2019-04-25 PROCEDURE — 25800030 ZZH RX IP 258 OP 636: Performed by: NURSE ANESTHETIST, CERTIFIED REGISTERED

## 2019-04-25 RX ORDER — LIDOCAINE HYDROCHLORIDE 20 MG/ML
INJECTION, SOLUTION INFILTRATION; PERINEURAL PRN
Status: DISCONTINUED | OUTPATIENT
Start: 2019-04-25 | End: 2019-04-25

## 2019-04-25 RX ORDER — PROPOFOL 10 MG/ML
INJECTION, EMULSION INTRAVENOUS PRN
Status: DISCONTINUED | OUTPATIENT
Start: 2019-04-25 | End: 2019-04-25

## 2019-04-25 RX ORDER — SODIUM CHLORIDE, SODIUM LACTATE, POTASSIUM CHLORIDE, CALCIUM CHLORIDE 600; 310; 30; 20 MG/100ML; MG/100ML; MG/100ML; MG/100ML
INJECTION, SOLUTION INTRAVENOUS CONTINUOUS
Status: DISCONTINUED | OUTPATIENT
Start: 2019-04-25 | End: 2019-04-25 | Stop reason: HOSPADM

## 2019-04-25 RX ORDER — PROPOFOL 10 MG/ML
INJECTION, EMULSION INTRAVENOUS CONTINUOUS PRN
Status: DISCONTINUED | OUTPATIENT
Start: 2019-04-25 | End: 2019-04-25

## 2019-04-25 RX ADMIN — LIDOCAINE HYDROCHLORIDE 1 ML: 10 INJECTION, SOLUTION EPIDURAL; INFILTRATION; INTRACAUDAL; PERINEURAL at 11:35

## 2019-04-25 RX ADMIN — PROPOFOL 150 MCG/KG/MIN: 10 INJECTION, EMULSION INTRAVENOUS at 11:42

## 2019-04-25 RX ADMIN — SODIUM CHLORIDE, POTASSIUM CHLORIDE, SODIUM LACTATE AND CALCIUM CHLORIDE: 600; 310; 30; 20 INJECTION, SOLUTION INTRAVENOUS at 11:35

## 2019-04-25 RX ADMIN — PROPOFOL 100 MG: 10 INJECTION, EMULSION INTRAVENOUS at 11:42

## 2019-04-25 RX ADMIN — LIDOCAINE HYDROCHLORIDE 100 MG: 20 INJECTION, SOLUTION INFILTRATION; PERINEURAL at 11:42

## 2019-04-25 NOTE — ANESTHESIA PREPROCEDURE EVALUATION
Anesthesia Pre-Procedure Evaluation    Patient: Micki Santos   MRN: [id number] : 1960          Preoperative Diagnosis: Screen for colon cancer    Procedure(s):  COLONOSCOPY    Past Medical History:   Diagnosis Date     Depressive disorder, not elsewhere classified     depression     Family history of skin cancer 2014     Thyroid disease      Unspecified essential hypertension      Past Surgical History:   Procedure Laterality Date     C REMOVAL OF OVARY(S)      left ovary removed-cyst, uterine fibroid removed     CHOLECYSTECTOMY, LAPOROSCOPIC  06    Cholecystectomy, Laparoscopic     ESOPHAGOSCOPY, GASTROSCOPY, DUODENOSCOPY (EGD), COMBINED N/A 2016    Procedure: COMBINED ESOPHAGOSCOPY, GASTROSCOPY, DUODENOSCOPY (EGD), BIOPSY SINGLE OR MULTIPLE;  Surgeon: Del Betts MD;  Location:  GI     GENITOURINARY SURGERY      removal of left ovary with cyst and uterine fibroid     HC REMOVAL OF TONSILS,12+ Y/O      Tonsils 11+y.o.     HC UGI ENDOSCOPY, SIMPLE EXAM  09       Anesthesia Evaluation     . Pt has had prior anesthetic. Type: MAC and General    No history of anesthetic complications          ROS/MED HX    ENT/Pulmonary:     (+)sleep apnea, LEROY risk factors snores loudly, hypertension, obese, , . .    Neurologic:  - neg neurologic ROS     Cardiovascular:     (+) hypertension-range: < 140 / 90, ---. : . . . :. . Previous cardiac testing date:results:date: results:ECG reviewed date:18 results:Sinus Bradycardia   -RSR(V1) -nondiagnostic.     PROBABLY NORMAL   date: results:          METS/Exercise Tolerance:     Hematologic:  - neg hematologic  ROS       Musculoskeletal:  - neg musculoskeletal ROS       GI/Hepatic:     (+) GERD Asymptomatic on medication,       Renal/Genitourinary:         Endo:     (+) thyroid problem hypothyroidism, Obesity, .      Psychiatric:     (+) psychiatric history depression      Infectious Disease:  - neg infectious disease ROS      "  Malignancy:      - no malignancy   Other:    - neg other ROS                      Physical Exam  Normal systems: cardiovascular, pulmonary and dental    Airway   Mallampati: II  TM distance: >3 FB  Neck ROM: full    Dental     Cardiovascular   Rhythm and rate: regular and normal  (-) no murmur    Pulmonary    breath sounds clear to auscultation            Lab Results   Component Value Date    WBC 10.7 07/30/2018    HGB 13.6 07/30/2018    HCT 40.6 07/30/2018     07/30/2018    CRP 4.8 07/30/2018    SED 9 05/19/2017     12/04/2018    POTASSIUM 4.4 12/04/2018    CHLORIDE 109 12/04/2018    CO2 23 12/04/2018    BUN 16 12/04/2018    CR 0.88 12/04/2018    GLC 83 12/04/2018    VANITA 8.8 12/04/2018    MAG 2.4 (H) 12/04/2018    ALBUMIN 3.7 12/04/2018    PROTTOTAL 7.6 12/04/2018    ALT 24 12/04/2018    AST 14 12/04/2018    ALKPHOS 116 12/04/2018    BILITOTAL 0.3 12/04/2018    LIPASE 168 12/11/2015    AMYLASE 28 (L) 12/11/2015    TSH 4.39 (H) 12/04/2018    T4 1.26 12/04/2018       Preop Vitals  BP Readings from Last 3 Encounters:   03/21/19 148/90   12/04/18 (!) 152/92   11/29/18 (!) 161/114    Pulse Readings from Last 3 Encounters:   03/21/19 69   12/04/18 52   11/29/18 58      Resp Readings from Last 3 Encounters:   03/21/19 16   12/04/18 16   11/29/18 16    SpO2 Readings from Last 3 Encounters:   03/21/19 96%   12/04/18 95%   11/29/18 98%      Temp Readings from Last 1 Encounters:   03/21/19 99.1  F (37.3  C) (Temporal)    Ht Readings from Last 1 Encounters:   03/21/19 1.613 m (5' 3.5\")      Wt Readings from Last 1 Encounters:   03/21/19 112.9 kg (249 lb)    Estimated body mass index is 43.42 kg/m  as calculated from the following:    Height as of 3/21/19: 1.613 m (5' 3.5\").    Weight as of 3/21/19: 112.9 kg (249 lb).       Anesthesia Plan      History & Physical Review  History and physical reviewed and following examination; no interval change.    ASA Status:  2 .    NPO Status:  > 6 hours    Plan for MAC with " Intravenous and Propofol induction. Maintenance will be TIVA.  Reason for MAC:  Deep or markedly invasive procedure (G8)         Postoperative Care      Consents  Anesthetic plan, risks, benefits and alternatives discussed with:  Patient.  Use of blood products discussed: No .   .                 BRIGITTE Holder CRNA

## 2019-04-25 NOTE — ANESTHESIA CARE TRANSFER NOTE
Patient: Micki Santos    Procedure(s):  Colonoscopy, With  Biopsy via forceps    Diagnosis: Screen for colon cancer  Diagnosis Additional Information: No value filed.    Anesthesia Type:   MAC     Note:  Airway :Face Mask  Patient transferred to:Phase II  Handoff Report: Identifed the Patient, Identified the Reponsible Provider, Reviewed the pertinent medical history, Discussed the surgical course, Reviewed Intra-OP anesthesia mangement and issues during anesthesia, Set expectations for post-procedure period and Allowed opportunity for questions and acknowledgement of understanding      Vitals: (Last set prior to Anesthesia Care Transfer)    CRNA VITALS  4/25/2019 1137 - 4/25/2019 1214      4/25/2019             Resp Rate (observed):  20                Electronically Signed By: BRIGITTE Holder CRNA  April 25, 2019  12:14 PM

## 2019-04-25 NOTE — DISCHARGE INSTRUCTIONS
[facility]    Home Care Following Endoscopy          Activity:    You have just undergone an endoscopic procedure usually performed with conscious sedation.  Do not work or operate machinery (including a car) for at least 12 hours.      I encourage you to walk and attempt to pass this air as soon as possible.    Diet:    Return to the diet you were on before your procedure but eat lightly for the first 12-24 hours.    Drink plenty of water.    Resume any regular medications unless otherwise advised by your physician.  Please begin any new medication prescribed as a result of your procedure as directed by your physician.     If you had any biopsy or polyp removed please refrain from aspirin or aspirin products for 2 days.  If on Coumadin please restart as instructed by your physician.   Pain:    You may take Tylenol as needed for pain.  Expected Recovery:    You can expect some mild abdominal fullness and/or discomfort due to the air used to inflate your intestinal tract.     Call Your Physician if You Have:      After Colonoscopy:  o Worsening persisting abdominal pain which is worse with activity.  o Fevers (>101 degrees F), chills or shakes.  o Passage of continued blood with bowel movements.   Any questions or concerns about your recovery, please call [phone] or after hours [phone] Nurse Advice Line.    Follow-up Care:    You should receive a call or letter with your results within 1 week. Please call if you have not received a notification of your results.  If asked to return to clinic please make an appointment 1 week after your procedure.  Call [phone].

## 2019-04-25 NOTE — ANESTHESIA POSTPROCEDURE EVALUATION
Patient: Micki Santos    Procedure(s):  Colonoscopy, With  Biopsy via forceps    Diagnosis:Screen for colon cancer  Diagnosis Additional Information: No value filed.    Anesthesia Type:  MAC    Note:  Anesthesia Post Evaluation    Patient location during evaluation: Phase 2  Patient participation: Able to fully participate in evaluation  Level of consciousness: awake and alert  Pain management: satisfactory to patient  Airway patency: patent  Cardiovascular status: stable  Respiratory status: spontaneous ventilation and room air  Hydration status: stable  PONV: none     Anesthetic complications: None    Comments: Appear to tolerate MAC with IV sedation well without anesthesia related problems / complications noted.  Pain level satisfactory per patient. No N  /  V .  No complaints per patient.  Will follow as needed.        Last vitals:  Vitals:    04/25/19 1127 04/25/19 1209 04/25/19 1215   BP: 148/77 135/72 150/79   Pulse:  60 59   Resp: 20 (P) 18    Temp: 97.6  F (36.4  C)     SpO2: 100% 99% 95%         Electronically Signed By: BRIGITTE Holder CRNA  April 25, 2019  12:19 PM

## 2019-04-25 NOTE — LETTER
[address] [address]           Tel [phone]     Micki Santos  [address] [facility] [address]      May 6, 2019    Dear Micki,  This letter is written to inform you of the results of your recent colonoscopy.  Your examination showed abnormal appearing tissue in your cecum. This area was biopsied and sent for review by a pathologist. As you will see on the pathology report below, the tissue(s) were normal. Your examination was otherwise without abnormality.    SPECIMEN(S):   Cecal biopsy     FINAL DIAGNOSIS:   Cecal biopsy:   - No diagnostic abnormalities identified, benign colonic mucosa without   inflammation or atypia.     Electronically signed out by:     CHRISTOPHE Thurston M.D.     Normal polyps are polyps that test negative for any pathologic (abnormal) changes.    Given these findings,  I recommend that you undergo a repeat colonoscopy in ten years for screening. We will enter you into a recall system so you receive a reminder closer to the time that you are due for repeat examination.     Please remember that this recommendation is made with the understanding that you are not experiencing persistent changes in bowel function, bleeding per rectum, and/or significant abdominal pain. If you experience these symptoms, please contact your primary care provider for a further evaluation.     If you have any questions or concerns about the results of your colonoscopy or the appropriate follow-up, please contact my assistant at [phone]    Sincerely,      [facility] Morales, DO  [facility]  ___

## 2019-04-26 NOTE — PROGRESS NOTES
SUBJECTIVE:   Micki Santos is a 58 year old female who presents to clinic today for the following health issues:      History of Present Illness     Depression & Anxiety Follow-up:     Depression/Anxiety:  Depression & Anxiety    Status since last visit::  Stable    Other associated symptoms of depression and anxiety::  None    Significant life event::  No    Current substance use::  None       Today's PHQ-9         PHQ-9 Total Score:     (P) 3   PHQ-9 Q9 Thoughts of better off dead/self-harm past 2 weeks :   (P) Not at all   Thoughts of suicide or self harm:      Self-harm Plan:        Self-harm Action:          Safety concerns for self or others:       FATMATA-7 Total Score: (P) 2    Hypertension:     Outpatient blood pressures:  Are being checked    Blood pressures checked at:  Work    Dietary sodium intake::  No added salt diet    Hypothyroidism:     Since last visit, patient describes the following symptoms::  Fatigue    Diet:  Regular (no restrictions)  Frequency of exercise:  1 day/week  Duration of exercise:  15-30 minutes  Taking medications regularly:  Yes  Medication side effects:  Significant flushing  Additional concerns today:  No  Answers for HPI/ROS submitted by the patient on 4/29/2019   Chronic problems general questions HPI Form  If you checked off any problems, how difficult have these problems made it for you to do your work, take care of things at home, or get along with other people?: Somewhat difficult  PHQ9 TOTAL SCORE: 3  FATMATA 7 TOTAL SCORE: 2    Patient reports ongoing bilateral knee pain.  Keeping her from activities, stairs hurt, worse at night. Taking 600 mg ibuprofen three times daily prn.  Tylenol doesn't help.  She denies injury.  Denies locking or giving out.  Denies swelling.    She has a lesion on her right jaw that she would like to have removed by dermatology    Additional history: as documented    Reviewed and updated as needed this visit by clinical staff  Tobacco   Allergies  Meds  Problems  Med Hx  Surg Hx  Fam Hx  Soc Hx          Reviewed and updated as needed this visit by Provider  Tobacco  Allergies  Meds  Problems  Med Hx  Surg Hx  Fam Hx         Patient Active Problem List   Diagnosis     Esophageal reflux     Mild major depression (H)     Pain in thoracic spine     Hypertension, goal below 140/90     Family history of skin cancer     Multiple nevi     Hypothyroidism     Morbid obesity, unspecified obesity type (H)     Fatigue, unspecified type     LEROY (obstructive sleep apnea)     Cervical cancer screening     Past Surgical History:   Procedure Laterality Date     C REMOVAL OF OVARY(S)      left ovary removed-cyst, uterine fibroid removed     CHOLECYSTECTOMY, LAPOROSCOPIC  06    Cholecystectomy, Laparoscopic     COLONOSCOPY N/A 2019    Procedure: Colonoscopy, With  Biopsy via forceps;  Surgeon: Gayle Morales MD;  Location: [facility]     ESOPHAGOSCOPY, GASTROSCOPY, DUODENOSCOPY (EGD), COMBINED N/A 2016    Procedure: COMBINED ESOPHAGOSCOPY, GASTROSCOPY, DUODENOSCOPY (EGD), BIOPSY SINGLE OR MULTIPLE;  Surgeon: Del Betts MD;  Location: [facility]     GENITOURINARY SURGERY      removal of left ovary with cyst and uterine fibroid     HC REMOVAL OF TONSILS,12+ Y/O      Tonsils 11+y.o.     HC UGI ENDOSCOPY, SIMPLE EXAM  09       Social History     Tobacco Use     Smoking status: Never Smoker     Smokeless tobacco: Never Used     Tobacco comment: No smokers in home   Substance Use Topics     Alcohol use: Yes     Comment: 1-2 glasses of wine/week     Family History   Problem Relation Age of Onset     C.A.D. Maternal Grandmother      C.A.D. Maternal Grandfather      C.A.D. Paternal Grandfather          age 38     Respiratory Mother         sarcoidosis     Hypertension Mother         chronic     Depression Mother         10+ years     Colon Cancer Mother      Diabetes Father         Type II diabetes     Coronary Artery Disease  Father         Heart Attack with Angioplasty/Stent     Arthritis No family hx of            ROS:  CONSTITUTIONAL: NEGATIVE for fever, chills, change in weight  ENT/MOUTH: NEGATIVE for ear, mouth and throat problems  RESP: NEGATIVE for significant cough or shortness of breath  CV: NEGATIVE for chest pain, palpitations or peripheral edema    OBJECTIVE:     /86   Pulse 55   Temp 98.8  F (37.1  C) (Temporal)   Resp 14   Wt 112.9 kg (249 lb)   LMP 07/29/2003   SpO2 97%   BMI 43.42 kg/m    Body mass index is 43.42 kg/m .  Gen: no apparent distress  NECK: no adenopathy, no asymmetry, no masses  Chest: clear to auscultation without wheeze, rale or rhonchi  Cor: regular rate and rhythm without murmur  Skin: Right jaw line there is a 1 cm lesion with some depth.  This is skin colored.  No erythema or telangiectasias.  Knees: No obvious edema, erythema or warmth.  She has medial joint line tenderness bilaterally.  Negative Willy's.  Collateral ligaments appear intact.  Ext: warm and dry without edema  Psych: Alert and oriented times 3; coherent speech, normal   rate and volume, able to articulate logical thoughts, able   to abstract reason, no tangential thoughts, no hallucinations   or delusions  Her affect is neutral    Knee x-ray: Bilateral medial compartments narrowed.    ASSESSMENT/PLAN:     1. Chronic pain of both knees  She has some narrowing of her medial compartment.  She will continue on her ibuprofen.  Will refer to orthopedics to consider injections.    - XR Knee Standing Left 2 Views; Future  - XR Knee Standing Right 2 Views; Future  - ORTHO  REFERRAL    2. Essential hypertension with goal blood pressure less than 140/90  Blood pressure has been elevated however on recheck her blood pressure was improved.  We discussed improving her diet, decreasing salt intake, decreasing caffeine and increasing her exercise.  Her knee pain is probably contributing as well to her blood pressure.  If her  blood pressure increases again and is consistently elevated would then increase her lisinopril dose.    - atenolol (TENORMIN) 50 MG tablet; Take 1 tablet (50 mg) by mouth daily  Dispense: 90 tablet; Refill: 3  - lisinopril (PRINIVIL/ZESTRIL) 20 MG tablet; Take 1 tablet (20 mg) by mouth daily  Dispense: 90 tablet; Refill: 3    3. Hypothyroidism, unspecified type  We will check a TSH today.    - TSH with free T4 reflex  - levothyroxine (SYNTHROID/LEVOTHROID) 137 MCG tablet; Take 1 tablet (137 mcg) by mouth daily  Dispense: 90 tablet; Refill: 3    4. Gastroesophageal reflux disease without esophagitis  Controlled on proton pump inhibitor, refills given.    - pantoprazole (PROTONIX) 40 MG EC tablet; Take 1 tablet (40 mg) by mouth daily  Dispense: 90 tablet; Refill: 3    5. Major depressive disorder, recurrent episode, mild (H)  Stable.  Refills given.    - sertraline (ZOLOFT) 100 MG tablet; TAKE 1 & 1/2 TABLETS DAILY  Dispense: 135 tablet; Refill: 3    6. Neoplasm of uncertain behavior of skin  Suspect intradermal cyst versus is nonpigmented nevus.  Will refer to dermatology for removal per her request.  - DERMATOLOGY REFERRAL      Miranda Bosch MD  [facility]

## 2019-04-29 ASSESSMENT — ANXIETY QUESTIONNAIRES
7. FEELING AFRAID AS IF SOMETHING AWFUL MIGHT HAPPEN: NOT AT ALL
5. BEING SO RESTLESS THAT IT IS HARD TO SIT STILL: NOT AT ALL
3. WORRYING TOO MUCH ABOUT DIFFERENT THINGS: NOT AT ALL
GAD7 TOTAL SCORE: 2
2. NOT BEING ABLE TO STOP OR CONTROL WORRYING: NOT AT ALL
4. TROUBLE RELAXING: SEVERAL DAYS
1. FEELING NERVOUS, ANXIOUS, OR ON EDGE: SEVERAL DAYS
7. FEELING AFRAID AS IF SOMETHING AWFUL MIGHT HAPPEN: NOT AT ALL
6. BECOMING EASILY ANNOYED OR IRRITABLE: NOT AT ALL

## 2019-04-29 ASSESSMENT — PATIENT HEALTH QUESTIONNAIRE - PHQ9
10. IF YOU CHECKED OFF ANY PROBLEMS, HOW DIFFICULT HAVE THESE PROBLEMS MADE IT FOR YOU TO DO YOUR WORK, TAKE CARE OF THINGS AT HOME, OR GET ALONG WITH OTHER PEOPLE: SOMEWHAT DIFFICULT
SUM OF ALL RESPONSES TO PHQ QUESTIONS 1-9: 3
SUM OF ALL RESPONSES TO PHQ QUESTIONS 1-9: 3

## 2019-04-30 ENCOUNTER — ANCILLARY PROCEDURE (OUTPATIENT)
Dept: GENERAL RADIOLOGY | Facility: OTHER | Age: 59
End: 2019-04-30
Attending: FAMILY MEDICINE
Payer: COMMERCIAL

## 2019-04-30 ENCOUNTER — OFFICE VISIT (OUTPATIENT)
Dept: FAMILY MEDICINE | Facility: OTHER | Age: 59
End: 2019-04-30
Payer: COMMERCIAL

## 2019-04-30 VITALS
BODY MASS INDEX: 43.42 KG/M2 | OXYGEN SATURATION: 97 % | DIASTOLIC BLOOD PRESSURE: 86 MMHG | HEART RATE: 55 BPM | RESPIRATION RATE: 14 BRPM | TEMPERATURE: 98.8 F | SYSTOLIC BLOOD PRESSURE: 136 MMHG | WEIGHT: 249 LBS

## 2019-04-30 DIAGNOSIS — M25.562 CHRONIC PAIN OF BOTH KNEES: ICD-10-CM

## 2019-04-30 DIAGNOSIS — F33.0 MAJOR DEPRESSIVE DISORDER, RECURRENT EPISODE, MILD (H): ICD-10-CM

## 2019-04-30 DIAGNOSIS — D48.5 NEOPLASM OF UNCERTAIN BEHAVIOR OF SKIN: ICD-10-CM

## 2019-04-30 DIAGNOSIS — G89.29 CHRONIC PAIN OF BOTH KNEES: ICD-10-CM

## 2019-04-30 DIAGNOSIS — I10 ESSENTIAL HYPERTENSION WITH GOAL BLOOD PRESSURE LESS THAN 140/90: ICD-10-CM

## 2019-04-30 DIAGNOSIS — E03.9 HYPOTHYROIDISM, UNSPECIFIED TYPE: ICD-10-CM

## 2019-04-30 DIAGNOSIS — K21.9 GASTROESOPHAGEAL REFLUX DISEASE WITHOUT ESOPHAGITIS: ICD-10-CM

## 2019-04-30 DIAGNOSIS — M25.561 CHRONIC PAIN OF BOTH KNEES: ICD-10-CM

## 2019-04-30 DIAGNOSIS — M25.562 CHRONIC PAIN OF BOTH KNEES: Primary | ICD-10-CM

## 2019-04-30 DIAGNOSIS — G89.29 CHRONIC PAIN OF BOTH KNEES: Primary | ICD-10-CM

## 2019-04-30 DIAGNOSIS — M25.561 CHRONIC PAIN OF BOTH KNEES: Primary | ICD-10-CM

## 2019-04-30 LAB
COPATH REPORT: NORMAL
TSH SERPL DL<=0.005 MIU/L-ACNC: 2.8 MU/L (ref 0.4–4)

## 2019-04-30 PROCEDURE — 36415 COLL VENOUS BLD VENIPUNCTURE: CPT | Performed by: FAMILY MEDICINE

## 2019-04-30 PROCEDURE — 73560 X-RAY EXAM OF KNEE 1 OR 2: CPT | Mod: LT

## 2019-04-30 PROCEDURE — 84443 ASSAY THYROID STIM HORMONE: CPT | Performed by: FAMILY MEDICINE

## 2019-04-30 PROCEDURE — 99214 OFFICE O/P EST MOD 30 MIN: CPT | Performed by: FAMILY MEDICINE

## 2019-04-30 PROCEDURE — 73560 X-RAY EXAM OF KNEE 1 OR 2: CPT | Mod: RT

## 2019-04-30 RX ORDER — LEVOTHYROXINE SODIUM 137 UG/1
137 TABLET ORAL DAILY
Qty: 90 TABLET | Refills: 3 | Status: SHIPPED | OUTPATIENT
Start: 2019-04-30 | End: 2019-05-15

## 2019-04-30 RX ORDER — LISINOPRIL 20 MG/1
20 TABLET ORAL DAILY
Qty: 90 TABLET | Refills: 3 | Status: SHIPPED | OUTPATIENT
Start: 2019-04-30 | End: 2020-01-22

## 2019-04-30 RX ORDER — ATENOLOL 50 MG/1
50 TABLET ORAL DAILY
Qty: 90 TABLET | Refills: 3 | Status: SHIPPED | OUTPATIENT
Start: 2019-04-30 | End: 2019-05-15

## 2019-04-30 RX ORDER — SERTRALINE HYDROCHLORIDE 100 MG/1
TABLET, FILM COATED ORAL
Qty: 135 TABLET | Refills: 3 | Status: SHIPPED | OUTPATIENT
Start: 2019-04-30 | End: 2020-01-22

## 2019-04-30 RX ORDER — PANTOPRAZOLE SODIUM 40 MG/1
40 TABLET, DELAYED RELEASE ORAL DAILY
Qty: 90 TABLET | Refills: 3 | Status: SHIPPED | OUTPATIENT
Start: 2019-04-30 | End: 2020-01-22

## 2019-04-30 ASSESSMENT — PATIENT HEALTH QUESTIONNAIRE - PHQ9: SUM OF ALL RESPONSES TO PHQ QUESTIONS 1-9: 3

## 2019-04-30 ASSESSMENT — ANXIETY QUESTIONNAIRES: GAD7 TOTAL SCORE: 2

## 2019-05-01 RX ORDER — ATENOLOL 50 MG/1
TABLET ORAL
Qty: 90 TABLET | Refills: 3 | Status: SHIPPED | OUTPATIENT
Start: 2019-05-01 | End: 2020-01-22

## 2019-05-01 RX ORDER — LEVOTHYROXINE SODIUM 137 UG/1
TABLET ORAL
Qty: 90 TABLET | Refills: 3 | Status: SHIPPED | OUTPATIENT
Start: 2019-05-01 | End: 2020-01-22

## 2019-05-14 ENCOUNTER — OFFICE VISIT (OUTPATIENT)
Dept: DERMATOLOGY | Facility: CLINIC | Age: 59
End: 2019-05-14
Attending: FAMILY MEDICINE
Payer: COMMERCIAL

## 2019-05-14 DIAGNOSIS — L57.8 SUN-DAMAGED SKIN: ICD-10-CM

## 2019-05-14 DIAGNOSIS — L50.9 URTICARIA: ICD-10-CM

## 2019-05-14 DIAGNOSIS — D18.01 CHERRY ANGIOMA: ICD-10-CM

## 2019-05-14 DIAGNOSIS — D22.9 IRRITATED NEVUS: ICD-10-CM

## 2019-05-14 DIAGNOSIS — L81.4 SOLAR LENTIGO: Primary | ICD-10-CM

## 2019-05-14 PROCEDURE — 99202 OFFICE O/P NEW SF 15 MIN: CPT | Performed by: DERMATOLOGY

## 2019-05-14 ASSESSMENT — PAIN SCALES - GENERAL: PAINLEVEL: NO PAIN (0)

## 2019-05-14 NOTE — LETTER
5/14/2019         RE: Micki Santos  [address]  [facility] [address]        Dear Colleague,    Thank you for referring your patient, Micki Santos, to the [facility]. Please see a copy of my visit note below.    [facility] Dermatology Note      Dermatology Problem List:  1. Neurofibroma vs neural nevus, right clavicle, s/p biopsy 8/14/14  2. Irritated nevus vs neurofibroma, right lateral neck, symptomatic  -will schedule punch biopsy to remove  3. Pink splotchy erythematous patches, forearms  -recommended trial of Zyrtec 10 mg daily to treat for mild urticaria      Encounter Date: May 14, 2019    CC:  Chief Complaint   Patient presents with     Lesion     right neck         History of Present Illness:  Ms. Micki Santos is a 58 year old female who presents as a referral from Dr. Bosch for a lesion of concern on her right neck. It hs been there for a year and half. It has gotten bigger. It gets irritated by her CPAP machine strap constantly. This is very bothersome to her. She notes that she will need the CPAP machine indefinitely and thus is hoping to get rid of the mole.     IN addition, she has splotchy red spots on her forearms that itch and burn. These always seems to happen in the same spots. She has not tried any treatments.     No other concerns addressed today.       Past Medical History:   Patient Active Problem List   Diagnosis     Esophageal reflux     Mild major depression (H)     Pain in thoracic spine     Hypertension, goal below 140/90     Family history of skin cancer     Multiple nevi     Hypothyroidism     Morbid obesity, unspecified obesity type (H)     Fatigue, unspecified type     LEROY (obstructive sleep apnea)     Cervical cancer screening     Past Medical History:   Diagnosis Date     Depressive disorder, not elsewhere classified     depression     Family history of skin cancer 8/14/2014     Thyroid disease       Unspecified essential hypertension      Past Surgical History:   Procedure Laterality Date     C REMOVAL OF OVARY(S)      left ovary removed-cyst, uterine fibroid removed     CHOLECYSTECTOMY, LAPOROSCOPIC  07/24/06    Cholecystectomy, Laparoscopic     COLONOSCOPY N/A 4/25/2019    Procedure: Colonoscopy, With  Biopsy via forceps;  Surgeon: Gayle Morales MD;  Location:  GI     ESOPHAGOSCOPY, GASTROSCOPY, DUODENOSCOPY (EGD), COMBINED N/A 2/5/2016    Procedure: COMBINED ESOPHAGOSCOPY, GASTROSCOPY, DUODENOSCOPY (EGD), BIOPSY SINGLE OR MULTIPLE;  Surgeon: eDl Betts MD;  Location:  GI     GENITOURINARY SURGERY  1988    removal of left ovary with cyst and uterine fibroid     HC REMOVAL OF TONSILS,12+ Y/O  1977    Tonsils 12+y.o.     HC UGI ENDOSCOPY, SIMPLE EXAM  02/11/09       Social History:  Patient reports that she has never smoked. She has never used smokeless tobacco. She reports that she drinks alcohol. She reports that she does not use drugs. Patient IT at [employer].     Family History:  Family history of NMSC, mother.     Medications:  Current Outpatient Medications   Medication Sig Dispense Refill     atenolol (TENORMIN) 50 MG tablet TAKE ONE TABLET BY MOUTH ONE TIME DAILY  90 tablet 3     atenolol (TENORMIN) 50 MG tablet Take 1 tablet (50 mg) by mouth daily 90 tablet 3     levothyroxine (SYNTHROID/LEVOTHROID) 137 MCG tablet TAKE ONE TABLET BY MOUTH ONE TIME DAILY  90 tablet 3     levothyroxine (SYNTHROID/LEVOTHROID) 137 MCG tablet Take 1 tablet (137 mcg) by mouth daily 90 tablet 3     lisinopril (PRINIVIL/ZESTRIL) 20 MG tablet Take 1 tablet (20 mg) by mouth daily 90 tablet 3     mometasone (NASONEX) 50 MCG/ACT spray Spray 2 sprays into both nostrils daily 1 Box 11     pantoprazole (PROTONIX) 40 MG EC tablet Take 1 tablet (40 mg) by mouth daily 90 tablet 3     sertraline (ZOLOFT) 100 MG tablet TAKE 1 & 1/2 TABLETS DAILY 135 tablet 3       Allergies   Allergen Reactions     No Known Drug Allergies         Review of Systems:  -Constitutional: Patient is otherwise feeling well, in usual state of health.   -Skin: As above in HPI. No additional skin concerns.    Physical exam:  Vitals: LMP 07/29/2003   GEN: This is a well developed, well-nourished female in no acute distress, in a pleasant mood.    SKIN: Sun-exposed skin, which includes the head/face, neck, both arms, digits, and/or nails was examined.   - Parker Type II  - Scattered brown macules on sun exposed areas, particuarly the medial cheeks.  - 6 mm pink papule firm, no atypia, right lateral neck  - delayed dermatographism on the upper back to scratch test  - There are dome shaped bright red papules on the face.   - Faint pink erythematous patches scattered on the forearms. These jayden with pressure.  - No other lesions of concern on areas examined.       Impression/Plan:  1. Sun damaged skin with solar lentigines    Recommend sunscreens SPF #30 or greater, protective clothing and avoidance of tanning beds.    2. Pink erythematous patches on the forearms with positive delayed dermatographism scratch test. Recommended patient take an otc Zyrtec 10 mg daily to see if this is helpful. If not, this may represent reflexive vasodilation.     3. Benign findings including: cherry angiomas    No further intervention required. Patient to report changes.     Patient reassured of the benign nature of these lesions.    4. Irritated nevus vs neurofibroma, right neck, symptomatic.     Patient reassured of the benign appearance of this lesion.    Discussed that treatment with a shave removal would not have the best cosmetic results given that lesion is barely elevated and has deeper aspect on palpation. Discussed that treatment with a punch removal would be best.  Discussed that scar in the area would be worse cosmetically to current lesion. Despite this, patient notes lesion is symptomatic enough that she does not care about the resulting scar. Reviewed risks of the  procedure.     Will schedule punch biopsy in a longer appt slot.     CC Miranda Bosch MD on close of this encounter.  Follow-up for biopsy.       Staff Involved:  Scribe/Staff    Scribe Disclosure  I, Rebecca Newton, am serving as a scribe to document services personally performed by Dr. Laly Coates MD, based on data collection and the provider's statements to me.     Provider Disclosure:   The documentation recorded by the scribe accurately reflects the services I personally performed and the decisions made by me.    Laly Coates MD    Department of Dermatology  [facility]: Phone: [phone], Fax:[phone]  [facility]: Phone: [phone], Fax: [phone]              Again, thank you for allowing me to participate in the care of your patient.        Sincerely,        Laly Coates MD

## 2019-05-14 NOTE — NURSING NOTE
@Micki Santos's goals for this visit include:   Chief Complaint   Patient presents with     Lesion     right neck     She requests these members of her care team be copied on today's visit information: NO    PCP: Miranda Bosch    Referring Provider:  Miranda Bosch MD  [address] [place] [address] [address]  [place] [address]    [facility] 07/29/2003     Do you need any medication refills at today's visit? NO    Shantel Fleming [facility]

## 2019-05-14 NOTE — PROGRESS NOTES
[facility] Dermatology Note      Dermatology Problem List:  1. Neurofibroma vs neural nevus, right clavicle, s/p biopsy 8/14/14  2. Irritated nevus vs neurofibroma, right lateral neck, symptomatic  -will schedule punch biopsy to remove  3. Pink splotchy erythematous patches, forearms  -recommended trial of Zyrtec 10 mg daily to treat for mild urticaria      Encounter Date: May 14, 2019    CC:  Chief Complaint   Patient presents with     Lesion     right neck         History of Present Illness:  Ms. Micki Santos is a 58 year old female who presents as a referral from Dr. Bosch for a lesion of concern on her right neck. It hs been there for a year and half. It has gotten bigger. It gets irritated by her CPAP machine strap constantly. This is very bothersome to her. She notes that she will need the CPAP machine indefinitely and thus is hoping to get rid of the mole.     IN addition, she has splotchy red spots on her forearms that itch and burn. These always seems to happen in the same spots. She has not tried any treatments.     No other concerns addressed today.       Past Medical History:   Patient Active Problem List   Diagnosis     Esophageal reflux     Mild major depression (H)     Pain in thoracic spine     Hypertension, goal below 140/90     Family history of skin cancer     Multiple nevi     Hypothyroidism     Morbid obesity, unspecified obesity type (H)     Fatigue, unspecified type     LEROY (obstructive sleep apnea)     Cervical cancer screening     Past Medical History:   Diagnosis Date     Depressive disorder, not elsewhere classified     depression     Family history of skin cancer 8/14/2014     Thyroid disease      Unspecified essential hypertension      Past Surgical History:   Procedure Laterality Date     C REMOVAL OF OVARY(S)      left ovary removed-cyst, uterine fibroid removed     CHOLECYSTECTOMY, LAPOROSCOPIC  07/24/06    Cholecystectomy, Laparoscopic      COLONOSCOPY N/A 4/25/2019    Procedure: Colonoscopy, With  Biopsy via forceps;  Surgeon: Gayle Morales MD;  Location:  GI     ESOPHAGOSCOPY, GASTROSCOPY, DUODENOSCOPY (EGD), COMBINED N/A 2/5/2016    Procedure: COMBINED ESOPHAGOSCOPY, GASTROSCOPY, DUODENOSCOPY (EGD), BIOPSY SINGLE OR MULTIPLE;  Surgeon: Del Betts MD;  Location:  GI     GENITOURINARY SURGERY  1988    removal of left ovary with cyst and uterine fibroid     HC REMOVAL OF TONSILS,12+ Y/O  1977    Tonsils 11+y.o.     HC UGI ENDOSCOPY, SIMPLE EXAM  02/11/09       Social History:  Patient reports that she has never smoked. She has never used smokeless tobacco. She reports that she drinks alcohol. She reports that she does not use drugs. Patient IT at [employer].     Family History:  Family history of NMSC, mother.     Medications:  Current Outpatient Medications   Medication Sig Dispense Refill     atenolol (TENORMIN) 50 MG tablet TAKE ONE TABLET BY MOUTH ONE TIME DAILY  90 tablet 3     atenolol (TENORMIN) 50 MG tablet Take 1 tablet (50 mg) by mouth daily 90 tablet 3     levothyroxine (SYNTHROID/LEVOTHROID) 137 MCG tablet TAKE ONE TABLET BY MOUTH ONE TIME DAILY  90 tablet 3     levothyroxine (SYNTHROID/LEVOTHROID) 137 MCG tablet Take 1 tablet (137 mcg) by mouth daily 90 tablet 3     lisinopril (PRINIVIL/ZESTRIL) 20 MG tablet Take 1 tablet (20 mg) by mouth daily 90 tablet 3     mometasone (NASONEX) 50 MCG/ACT spray Spray 2 sprays into both nostrils daily 1 Box 11     pantoprazole (PROTONIX) 40 MG EC tablet Take 1 tablet (40 mg) by mouth daily 90 tablet 3     sertraline (ZOLOFT) 100 MG tablet TAKE 1 & 1/2 TABLETS DAILY 135 tablet 3       Allergies   Allergen Reactions     No Known Drug Allergies        Review of Systems:  -Constitutional: Patient is otherwise feeling well, in usual state of health.   -Skin: As above in HPI. No additional skin concerns.    Physical exam:  Vitals: LMP 07/29/2003   GEN: This is a well developed, well-nourished  female in no acute distress, in a pleasant mood.    SKIN: Sun-exposed skin, which includes the head/face, neck, both arms, digits, and/or nails was examined.   - Parker Type II  - Scattered brown macules on sun exposed areas, particuarly the medial cheeks.  - 6 mm pink papule firm, no atypia, right lateral neck  - delayed dermatographism on the upper back to scratch test  - There are dome shaped bright red papules on the face.   - Faint pink erythematous patches scattered on the forearms. These jayden with pressure.  - No other lesions of concern on areas examined.       Impression/Plan:  1. Sun damaged skin with solar lentigines    Recommend sunscreens SPF #30 or greater, protective clothing and avoidance of tanning beds.    2. Pink erythematous patches on the forearms with positive delayed dermatographism scratch test. Recommended patient take an otc Zyrtec 10 mg daily to see if this is helpful. If not, this may represent reflexive vasodilation.     3. Benign findings including: cherry angiomas    No further intervention required. Patient to report changes.     Patient reassured of the benign nature of these lesions.    4. Irritated nevus vs neurofibroma, right neck, symptomatic.     Patient reassured of the benign appearance of this lesion.    Discussed that treatment with a shave removal would not have the best cosmetic results given that lesion is barely elevated and has deeper aspect on palpation. Discussed that treatment with a punch removal would be best.  Discussed that scar in the area would be worse cosmetically to current lesion. Despite this, patient notes lesion is symptomatic enough that she does not care about the resulting scar. Reviewed risks of the procedure.     Will schedule punch biopsy in a longer appt slot.     CC Miranda Bosch MD on close of this encounter.  Follow-up for biopsy.       Staff Involved:  Scribe/Staff    Scribe Disclosure  I, Rebecca Glez, am serving as a scribe  to document services personally performed by Dr. Laly Coates MD, based on data collection and the provider's statements to me.     Provider Disclosure:   The documentation recorded by the scribe accurately reflects the services I personally performed and the decisions made by me.    Laly Coates MD    Department of Dermatology  [facility]: Phone: [phone], Fax:[phone]  [facility]: Phone: [phone], Fax: [phone]

## 2019-05-15 ENCOUNTER — ANCILLARY PROCEDURE (OUTPATIENT)
Dept: GENERAL RADIOLOGY | Facility: OTHER | Age: 59
End: 2019-05-15
Attending: ORTHOPAEDIC SURGERY
Payer: COMMERCIAL

## 2019-05-15 ENCOUNTER — OFFICE VISIT (OUTPATIENT)
Dept: ORTHOPEDICS | Facility: OTHER | Age: 59
End: 2019-05-15
Attending: FAMILY MEDICINE
Payer: COMMERCIAL

## 2019-05-15 VITALS
SYSTOLIC BLOOD PRESSURE: 124 MMHG | WEIGHT: 252 LBS | BODY MASS INDEX: 43.02 KG/M2 | HEIGHT: 64 IN | DIASTOLIC BLOOD PRESSURE: 80 MMHG

## 2019-05-15 DIAGNOSIS — M25.562 CHRONIC PAIN OF BOTH KNEES: Primary | ICD-10-CM

## 2019-05-15 DIAGNOSIS — M25.561 BILATERAL KNEE PAIN: ICD-10-CM

## 2019-05-15 DIAGNOSIS — M25.561 CHRONIC PAIN OF BOTH KNEES: Primary | ICD-10-CM

## 2019-05-15 DIAGNOSIS — M25.562 BILATERAL KNEE PAIN: ICD-10-CM

## 2019-05-15 DIAGNOSIS — G89.29 CHRONIC PAIN OF BOTH KNEES: Primary | ICD-10-CM

## 2019-05-15 PROCEDURE — 20610 DRAIN/INJ JOINT/BURSA W/O US: CPT | Mod: 50 | Performed by: ORTHOPAEDIC SURGERY

## 2019-05-15 PROCEDURE — 73560 X-RAY EXAM OF KNEE 1 OR 2: CPT | Mod: RT

## 2019-05-15 PROCEDURE — 99203 OFFICE O/P NEW LOW 30 MIN: CPT | Mod: 25 | Performed by: ORTHOPAEDIC SURGERY

## 2019-05-15 RX ORDER — TRIAMCINOLONE ACETONIDE 40 MG/ML
40 INJECTION, SUSPENSION INTRA-ARTICULAR; INTRAMUSCULAR ONCE
Status: COMPLETED | OUTPATIENT
Start: 2019-05-15 | End: 2019-05-15

## 2019-05-15 RX ADMIN — TRIAMCINOLONE ACETONIDE 40 MG: 40 INJECTION, SUSPENSION INTRA-ARTICULAR; INTRAMUSCULAR at 13:38

## 2019-05-15 ASSESSMENT — PAIN SCALES - GENERAL: PAINLEVEL: SEVERE PAIN (7)

## 2019-05-15 ASSESSMENT — MIFFLIN-ST. JEOR: SCORE: 1695.12

## 2019-05-15 NOTE — LETTER
5/15/2019         RE: Micki Santos  [address] [facility] [address]        Dear Colleague,    Thank you for referring your patient, Micki Santos, to the [facility]. Please see a copy of my visit note below.    ORTHOPEDIC CONSULT      Chief Complaint: Micki Santos is a 58 year old female who is being seen for Chief Complaint   Patient presents with     Knee Pain     bilateral knee pain     Consult       History of Present Illness:   Presents with bilateral knee pain.  Left worse than the right.  No specific traumas or injuries.  Worse in the morning will improve throughout the day.  Ibuprofen is been helpful.  Tylenol has not helped.  No previous injections or therapies.  Complains of generalized pain but more significantly along the posterior aspect.      Patient's past medical, surgical, social and family histories reviewed.     Past Medical History:   Diagnosis Date     Depressive disorder, not elsewhere classified     depression     Family history of skin cancer 8/14/2014     Thyroid disease      Unspecified essential hypertension        Past Surgical History:   Procedure Laterality Date     C REMOVAL OF OVARY(S)      left ovary removed-cyst, uterine fibroid removed     CHOLECYSTECTOMY, LAPOROSCOPIC  07/24/06    Cholecystectomy, Laparoscopic     COLONOSCOPY N/A 4/25/2019    Procedure: Colonoscopy, With  Biopsy via forceps;  Surgeon: Gayle Morales MD;  Location:  GI     ESOPHAGOSCOPY, GASTROSCOPY, DUODENOSCOPY (EGD), COMBINED N/A 2/5/2016    Procedure: COMBINED ESOPHAGOSCOPY, GASTROSCOPY, DUODENOSCOPY (EGD), BIOPSY SINGLE OR MULTIPLE;  Surgeon: Del Betts MD;  Location:  GI     GENITOURINARY SURGERY  1988    removal of left ovary with cyst and uterine fibroid      REMOVAL OF TONSILS,12+ Y/O  1977    Tonsils 11+y.o.      UGI ENDOSCOPY, SIMPLE EXAM  02/11/09       Medications:    Current Outpatient Medications on File Prior to Visit:  atenolol  "(TENORMIN) 50 MG tablet TAKE ONE TABLET BY MOUTH ONE TIME DAILY    levothyroxine (SYNTHROID/LEVOTHROID) 137 MCG tablet TAKE ONE TABLET BY MOUTH ONE TIME DAILY    lisinopril (PRINIVIL/ZESTRIL) 20 MG tablet Take 1 tablet (20 mg) by mouth daily   pantoprazole (PROTONIX) 40 MG EC tablet Take 1 tablet (40 mg) by mouth daily   sertraline (ZOLOFT) 100 MG tablet TAKE 1 & 1/2 TABLETS DAILY   mometasone (NASONEX) 50 MCG/ACT spray Spray 2 sprays into both nostrils daily (Patient not taking: Reported on 5/15/2019)     No current facility-administered medications on file prior to visit.     Allergies   Allergen Reactions     No Known Drug Allergies        Social History     Occupational History     Not on file   Tobacco Use     Smoking status: Never Smoker     Smokeless tobacco: Never Used     Tobacco comment: No smokers in home   Substance and Sexual Activity     Alcohol use: Yes     Comment: 1-2 glasses of wine/week     Drug use: No     Sexual activity: Yes     Partners: Male     Birth control/protection: Post-menopausal       Family History   Problem Relation Age of Onset     C.A.D. Maternal Grandmother      C.A.D. Maternal Grandfather      C.A.D. Paternal Grandfather          age 38     Respiratory Mother         sarcoidosis     Hypertension Mother         chronic     Depression Mother         10+ years     Colon Cancer Mother      Diabetes Father         Type II diabetes     Coronary Artery Disease Father         Heart Attack with Angioplasty/Stent     Arthritis No family hx of        REVIEW OF SYSTEMS  10 point review systems performed otherwise negative as noted as per history of present illness.    Physical Exam:  Vitals: /80   Ht 1.613 m (5' 3.5\")   Wt 114.3 kg (252 lb)   LMP 2003   BMI 43.94 kg/m     BMI= Body mass index is 43.94 kg/m .  Constitutional: healthy, alert and no acute distress   Psychiatric: mentation appears normal and affect normal/bright  NEURO: no focal deficits  RESP: Normal with " easy respirations and no use of accessory muscles to breathe, no audible wheezing or retractions  CV: bilateral lower extremity:   no edema           SKIN: No erythema, rashes, excoriation, or breakdown. No evidence of infection.   JOINT/EXTREMITIES:bilateral knees: 0-125 degrees active motion.  There is some slight lateral maltracking of the patella.  There is patellofemoral crepitus with motion.  Tenderness generalized along the patellofemoral compartment.  She also has some joint line pain.  She also has tenderness along the posterior hamstring area.  No instability with varus and valgus testing.  Negative Lockman.  Negative Willy.     GAIT: not tested     Diagnostic Modalities:  bilateral knee X-ray: Overall fairly well-preserved articular space.  Patellofemoral does show some slight subchondral cystic changes and minimal narrowing.  There is no fractures or dislocations.  Independent visualization of the images was performed.      Impression: bilateral knee pain probable early primary osteoarthritis with some patellar maltracking    Plan:  All of the above pertinent physical exam and imaging modalities findings was reviewed with Micki.                                          CONSERVATIVE CARE:  I recommend conservative care for the patient to include NSAIDs, focused self directed physical therapy, formal physical therapy, steroid injections. Today I provided or dispensed physical therapy.                                        INJECTION PROCEDURE:  The patient was counseled about an  injection, including discussion of risks (including infection), contents of the injection, rationale for performing the injection, and expected benefits of the injection. The skin was prepped with alcohol and betadine and then utilizing sterile technique an injection of the bilateral knee joint from the anterolateral approach in the seated position was performed. The injection consisted 1ml of Kenalog (40mg per 1 ml) mixed with  3ml of 0.5% Marcaine. The patient tolerated the injection well, and there were no complications. The injection site was covered with a Band-Aid. The injection was performed by BRIGITTE Reyes, CNP, BRAEDEN    Options discussed.  We also discussed weight loss.  She is can be starting a program at work coming up in a few weeks.    Return to clinic 4-6 , week(s), PRN, or sooner as needed for changes.  Re-x-ray on return: Lianna Nice D.O.    Prior to injection, verified patient identity using patient's name and date of birth.  Due to injection administration, patient instructed to remain in clinic for 15 minutes  afterwards, and to report any adverse reaction to me immediately.    Joint injection was performed.      Drug Amount Wasted:  None.  Vial/Syringe: Single dose vial  Expiration Date:      The following medication was given by BRIGITTE Leon, CNP, BRAEDEN:     MEDICATION: Kenalog 40mg/1ml  ROUTE: Joint Injection  SITE: bilateral knee  DOSE: 1 mL  LOT #: [id number]  : [employer]  EXPIRATION DATE:    NDC: [address]-9              Again, thank you for allowing me to participate in the care of your patient.        Sincerely,        Nitin Nice DO

## 2019-05-15 NOTE — PROGRESS NOTES
ORTHOPEDIC CONSULT      Chief Complaint: Micki Santos is a 58 year old female who is being seen for Chief Complaint   Patient presents with     Knee Pain     bilateral knee pain     Consult       History of Present Illness:   Presents with bilateral knee pain.  Left worse than the right.  No specific traumas or injuries.  Worse in the morning will improve throughout the day.  Ibuprofen is been helpful.  Tylenol has not helped.  No previous injections or therapies.  Complains of generalized pain but more significantly along the posterior aspect.      Patient's past medical, surgical, social and family histories reviewed.     Past Medical History:   Diagnosis Date     Depressive disorder, not elsewhere classified     depression     Family history of skin cancer 8/14/2014     Thyroid disease      Unspecified essential hypertension        Past Surgical History:   Procedure Laterality Date     C REMOVAL OF OVARY(S)      left ovary removed-cyst, uterine fibroid removed     CHOLECYSTECTOMY, LAPOROSCOPIC  07/24/06    Cholecystectomy, Laparoscopic     COLONOSCOPY N/A 4/25/2019    Procedure: Colonoscopy, With  Biopsy via forceps;  Surgeon: Gayle Morales MD;  Location:  GI     ESOPHAGOSCOPY, GASTROSCOPY, DUODENOSCOPY (EGD), COMBINED N/A 2/5/2016    Procedure: COMBINED ESOPHAGOSCOPY, GASTROSCOPY, DUODENOSCOPY (EGD), BIOPSY SINGLE OR MULTIPLE;  Surgeon: Del Betts MD;  Location:  GI     GENITOURINARY SURGERY  1988    removal of left ovary with cyst and uterine fibroid      REMOVAL OF TONSILS,12+ Y/O  1977    Tonsils 11+y.o.      UGI ENDOSCOPY, SIMPLE EXAM  02/11/09       Medications:    Current Outpatient Medications on File Prior to Visit:  atenolol (TENORMIN) 50 MG tablet TAKE ONE TABLET BY MOUTH ONE TIME DAILY    levothyroxine (SYNTHROID/LEVOTHROID) 137 MCG tablet TAKE ONE TABLET BY MOUTH ONE TIME DAILY    lisinopril (PRINIVIL/ZESTRIL) 20 MG tablet Take 1 tablet (20 mg) by mouth daily   pantoprazole  "(PROTONIX) 40 MG EC tablet Take 1 tablet (40 mg) by mouth daily   sertraline (ZOLOFT) 100 MG tablet TAKE 1 & 1/2 TABLETS DAILY   mometasone (NASONEX) 50 MCG/ACT spray Spray 2 sprays into both nostrils daily (Patient not taking: Reported on 5/15/2019)     No current facility-administered medications on file prior to visit.     Allergies   Allergen Reactions     No Known Drug Allergies        Social History     Occupational History     Not on file   Tobacco Use     Smoking status: Never Smoker     Smokeless tobacco: Never Used     Tobacco comment: No smokers in home   Substance and Sexual Activity     Alcohol use: Yes     Comment: 1-2 glasses of wine/week     Drug use: No     Sexual activity: Yes     Partners: Male     Birth control/protection: Post-menopausal       Family History   Problem Relation Age of Onset     C.A.D. Maternal Grandmother      C.A.D. Maternal Grandfather      C.A.D. Paternal Grandfather          age 38     Respiratory Mother         sarcoidosis     Hypertension Mother         chronic     Depression Mother         10+ years     Colon Cancer Mother      Diabetes Father         Type II diabetes     Coronary Artery Disease Father         Heart Attack with Angioplasty/Stent     Arthritis No family hx of        REVIEW OF SYSTEMS  10 point review systems performed otherwise negative as noted as per history of present illness.    Physical Exam:  Vitals: /80   Ht 1.613 m (5' 3.5\")   Wt 114.3 kg (252 lb)   LMP 2003   BMI 43.94 kg/m    BMI= Body mass index is 43.94 kg/m .  Constitutional: healthy, alert and no acute distress   Psychiatric: mentation appears normal and affect normal/bright  NEURO: no focal deficits  RESP: Normal with easy respirations and no use of accessory muscles to breathe, no audible wheezing or retractions  CV: bilateral lower extremity:   no edema           SKIN: No erythema, rashes, excoriation, or breakdown. No evidence of infection.   JOINT/EXTREMITIES:bilateral " knees: 0-125 degrees active motion.  There is some slight lateral maltracking of the patella.  There is patellofemoral crepitus with motion.  Tenderness generalized along the patellofemoral compartment.  She also has some joint line pain.  She also has tenderness along the posterior hamstring area.  No instability with varus and valgus testing.  Negative Lockman.  Negative Willy.     GAIT: not tested     Diagnostic Modalities:  bilateral knee X-ray: Overall fairly well-preserved articular space.  Patellofemoral does show some slight subchondral cystic changes and minimal narrowing.  There is no fractures or dislocations.  Independent visualization of the images was performed.      Impression: bilateral knee pain probable early primary osteoarthritis with some patellar maltracking    Plan:  All of the above pertinent physical exam and imaging modalities findings was reviewed with Micki.                                          CONSERVATIVE CARE:  I recommend conservative care for the patient to include NSAIDs, focused self directed physical therapy, formal physical therapy, steroid injections. Today I provided or dispensed physical therapy.                                        INJECTION PROCEDURE:  The patient was counseled about an  injection, including discussion of risks (including infection), contents of the injection, rationale for performing the injection, and expected benefits of the injection. The skin was prepped with alcohol and betadine and then utilizing sterile technique an injection of the bilateral knee joint from the anterolateral approach in the seated position was performed. The injection consisted 1ml of Kenalog (40mg per 1 ml) mixed with 3ml of 0.5% Marcaine. The patient tolerated the injection well, and there were no complications. The injection site was covered with a Band-Aid. The injection was performed by Sampson Hutchison, BRIGITTE, CNP, DNP    Options discussed.  We also discussed weight loss.   She is can be starting a program at work coming up in a few weeks.    Return to clinic 4-6 , week(s), PRN, or sooner as needed for changes.  Re-x-ray on return: No    lAberto Nice D.O.

## 2019-05-15 NOTE — PROGRESS NOTES
Prior to injection, verified patient identity using patient's name and date of birth.  Due to injection administration, patient instructed to remain in clinic for 15 minutes  afterwards, and to report any adverse reaction to me immediately.    Joint injection was performed.      Drug Amount Wasted:  None.  Vial/Syringe: Single dose vial  Expiration Date:      The following medication was given by BRIGITTE Leon, CNP, DNP:     MEDICATION: Kenalog 40mg/1ml  ROUTE: Joint Injection  SITE: bilateral knee  DOSE: 1 mL  LOT #: [id number]  : [employer]  EXPIRATION DATE:    NDC: [address]-1

## 2019-06-06 ENCOUNTER — OFFICE VISIT (OUTPATIENT)
Dept: DERMATOLOGY | Facility: CLINIC | Age: 59
End: 2019-06-06
Payer: COMMERCIAL

## 2019-06-06 DIAGNOSIS — D48.5 NEOPLASM OF UNCERTAIN BEHAVIOR OF SKIN: Primary | ICD-10-CM

## 2019-06-06 PROCEDURE — 11104 PUNCH BX SKIN SINGLE LESION: CPT | Performed by: DERMATOLOGY

## 2019-06-06 PROCEDURE — 88305 TISSUE EXAM BY PATHOLOGIST: CPT | Mod: TC | Performed by: DERMATOLOGY

## 2019-06-06 NOTE — PATIENT INSTRUCTIONS

## 2019-06-06 NOTE — LETTER
6/6/2019         RE: Micki Santos  [address] [facility] [address]        Dear Colleague,    Thank you for referring your patient, Micki Santos, to the [facility]. Please see a copy of my visit note below.    [facility] Dermatology Note      Dermatology Problem List:  1. Neurofibroma vs neural nevus, right clavicle, s/p biopsy 8/14/14  2. Irritated nevus vs neurofibroma, right lateral neck, symptomatic, s/p punch excision 6/6/2019  3. Pink splotchy erythematous patches, forearms  -recommended trial of Zyrtec 10 mg daily to treat for mild urticaria      Encounter Date: Jun 6, 2019    CC:  Chief Complaint   Patient presents with     Procedure     punch biopsy          History of Present Illness:  Ms. Micki Santos is a 58 year old female who presents as a follow up for excision for an irritated lesion on her right lateral neck. She was last seen 5/14/2019. Today, she reports that the lesion remains painful and irritated. She is hoping to have this removed today.     No other concerns addressed today.       Past Medical History:   Patient Active Problem List   Diagnosis     Esophageal reflux     Mild major depression (H)     Pain in thoracic spine     Hypertension, goal below 140/90     Family history of skin cancer     Multiple nevi     Hypothyroidism     Morbid obesity, unspecified obesity type (H)     Fatigue, unspecified type     LEROY (obstructive sleep apnea)     Cervical cancer screening     Past Medical History:   Diagnosis Date     Depressive disorder, not elsewhere classified     depression     Family history of skin cancer 8/14/2014     Thyroid disease      Unspecified essential hypertension      Past Surgical History:   Procedure Laterality Date     C REMOVAL OF OVARY(S)      left ovary removed-cyst, uterine fibroid removed     CHOLECYSTECTOMY, LAPOROSCOPIC  07/24/06    Cholecystectomy, Laparoscopic     COLONOSCOPY N/A 4/25/2019     Procedure: Colonoscopy, With  Biopsy via forceps;  Surgeon: Gayle Morales MD;  Location:  GI     ESOPHAGOSCOPY, GASTROSCOPY, DUODENOSCOPY (EGD), COMBINED N/A 2/5/2016    Procedure: COMBINED ESOPHAGOSCOPY, GASTROSCOPY, DUODENOSCOPY (EGD), BIOPSY SINGLE OR MULTIPLE;  Surgeon: Del Betts MD;  Location:  GI     GENITOURINARY SURGERY  1988    removal of left ovary with cyst and uterine fibroid     HC REMOVAL OF TONSILS,12+ Y/O  1977    Tonsils 11+y.o.     HC UGI ENDOSCOPY, SIMPLE EXAM  02/11/09       Social History:  Patient reports that she has never smoked. She has never used smokeless tobacco. She reports that she drinks alcohol. She reports that she does not use drugs. Patient IT at [employer].     Family History:  Family history of NMSC, mother.     Medications:  Current Outpatient Medications   Medication Sig Dispense Refill     atenolol (TENORMIN) 50 MG tablet TAKE ONE TABLET BY MOUTH ONE TIME DAILY  90 tablet 3     levothyroxine (SYNTHROID/LEVOTHROID) 137 MCG tablet TAKE ONE TABLET BY MOUTH ONE TIME DAILY  90 tablet 3     lisinopril (PRINIVIL/ZESTRIL) 20 MG tablet Take 1 tablet (20 mg) by mouth daily 90 tablet 3     mometasone (NASONEX) 50 MCG/ACT spray Spray 2 sprays into both nostrils daily 1 Box 11     pantoprazole (PROTONIX) 40 MG EC tablet Take 1 tablet (40 mg) by mouth daily 90 tablet 3     sertraline (ZOLOFT) 100 MG tablet TAKE 1 & 1/2 TABLETS DAILY 135 tablet 3       Allergies   Allergen Reactions     No Known Drug Allergies        Review of Systems:  -Constitutional: Patient is otherwise feeling well, in usual state of health.   -Skin: As above in HPI. No additional skin concerns.    Physical exam:  Vitals: LMP 07/29/2003   GEN: This is a well developed, well-nourished female in no acute distress, in a pleasant mood.   SKIN: Sun-exposed skin, which includes the head/face, neck, both arms, digits, and/or nails was examined.   - Parker Type II  - 6 mm pink papule firm, right lateral  neck  - No other lesions of concern on areas examined.               Impression/Plan:    1. NUB, right neck. 6 mm firm pink papule. Ddx: irritated nevus vs neurofibroma. Significantly symptomatic which warrants removal.     Punch biopsy:  After discussion of benefits and risks including but not limited to bleeding/bruising, pain/swelling, infection, scar, incomplete removal, nerve damage/numbness, recurrence, and non-diagnostic biopsy, written consent, verbal consent and photographs were obtained. Time-out was performed. The area was cleaned with isopropyl alcohol. 0.5mL of 1% lidocaine with 1:100,000 epinephrine was injected to obtain adequate anesthesia of the lesion on the right neck.  A 8 mm punch biopsy was performed. 4-0 vicryl suture was used to approximate dermis. 4-0 prolene sutures (3 interrupted) were utilized to approximate the epidermal edges. White petroleum jelly/Vaseline and a bandage was applied to the wound. Explicit verbal and written wound care instructions were provided. The patient left the Dermatology Clinic in good condition. The patient was counseled to follow up for suture removal in approximately 10-14 days.      CC Miranda Bosch MD on close of this encounter.  Follow-up pending biopsy results, sooner for lesion of concern.       Staff Involved:  Scribe/Staff    Scribe Disclosure  I, Rebecca Glez, am serving as a scribe to document services personally performed by Dr. Laly Coates MD, based on data collection and the provider's statements to me.     Provider Disclosure:   The documentation recorded by the scribe accurately reflects the services I personally performed and the decisions made by me.    Laly Coates MD    Department of Dermatology  [facility]: Phone: [phone], Fax:[phone]  [facility] [place]: Phone: [phone], Fax:  [phone]                    Again, thank you for allowing me to participate in the care of your patient.        Sincerely,        Laly Coates MD

## 2019-06-06 NOTE — PROGRESS NOTES
[facility] Dermatology Note      Dermatology Problem List:  1. Neurofibroma vs neural nevus, right clavicle, s/p biopsy 8/14/14  2. Irritated nevus vs neurofibroma, right lateral neck, symptomatic, s/p punch excision 6/6/2019  3. Pink splotchy erythematous patches, forearms  -recommended trial of Zyrtec 10 mg daily to treat for mild urticaria      Encounter Date: Jun 6, 2019    CC:  Chief Complaint   Patient presents with     Procedure     punch biopsy          History of Present Illness:  Ms. Micki Santos is a 58 year old female who presents as a follow up for excision for an irritated lesion on her right lateral neck. She was last seen 5/14/2019. Today, she reports that the lesion remains painful and irritated. She is hoping to have this removed today.     No other concerns addressed today.       Past Medical History:   Patient Active Problem List   Diagnosis     Esophageal reflux     Mild major depression (H)     Pain in thoracic spine     Hypertension, goal below 140/90     Family history of skin cancer     Multiple nevi     Hypothyroidism     Morbid obesity, unspecified obesity type (H)     Fatigue, unspecified type     LEROY (obstructive sleep apnea)     Cervical cancer screening     Past Medical History:   Diagnosis Date     Depressive disorder, not elsewhere classified     depression     Family history of skin cancer 8/14/2014     Thyroid disease      Unspecified essential hypertension      Past Surgical History:   Procedure Laterality Date     C REMOVAL OF OVARY(S)      left ovary removed-cyst, uterine fibroid removed     CHOLECYSTECTOMY, LAPOROSCOPIC  07/24/06    Cholecystectomy, Laparoscopic     COLONOSCOPY N/A 4/25/2019    Procedure: Colonoscopy, With  Biopsy via forceps;  Surgeon: Gayle Morales MD;  Location:  GI     ESOPHAGOSCOPY, GASTROSCOPY, DUODENOSCOPY (EGD), COMBINED N/A 2/5/2016    Procedure: COMBINED ESOPHAGOSCOPY, GASTROSCOPY, DUODENOSCOPY (EGD), BIOPSY SINGLE OR  MULTIPLE;  Surgeon: Del Betts MD;  Location:  GI     GENITOURINARY SURGERY  1988    removal of left ovary with cyst and uterine fibroid     HC REMOVAL OF TONSILS,12+ Y/O  1977    Tonsils 11+y.o.     HC UGI ENDOSCOPY, SIMPLE EXAM  02/11/09       Social History:  Patient reports that she has never smoked. She has never used smokeless tobacco. She reports that she drinks alcohol. She reports that she does not use drugs. Patient IT at [employer].     Family History:  Family history of NMSC, mother.     Medications:  Current Outpatient Medications   Medication Sig Dispense Refill     atenolol (TENORMIN) 50 MG tablet TAKE ONE TABLET BY MOUTH ONE TIME DAILY  90 tablet 3     levothyroxine (SYNTHROID/LEVOTHROID) 137 MCG tablet TAKE ONE TABLET BY MOUTH ONE TIME DAILY  90 tablet 3     lisinopril (PRINIVIL/ZESTRIL) 20 MG tablet Take 1 tablet (20 mg) by mouth daily 90 tablet 3     mometasone (NASONEX) 50 MCG/ACT spray Spray 2 sprays into both nostrils daily 1 Box 11     pantoprazole (PROTONIX) 40 MG EC tablet Take 1 tablet (40 mg) by mouth daily 90 tablet 3     sertraline (ZOLOFT) 100 MG tablet TAKE 1 & 1/2 TABLETS DAILY 135 tablet 3       Allergies   Allergen Reactions     No Known Drug Allergies        Review of Systems:  -Constitutional: Patient is otherwise feeling well, in usual state of health.   -Skin: As above in HPI. No additional skin concerns.    Physical exam:  Vitals: LMP 07/29/2003   GEN: This is a well developed, well-nourished female in no acute distress, in a pleasant mood.   SKIN: Sun-exposed skin, which includes the head/face, neck, both arms, digits, and/or nails was examined.   - Parker Type II  - 6 mm pink papule firm, right lateral neck  - No other lesions of concern on areas examined.               Impression/Plan:    1. NUB, right neck. 6 mm firm pink papule. Ddx: irritated nevus vs neurofibroma. Significantly symptomatic which warrants removal.     Punch biopsy:  After discussion of  benefits and risks including but not limited to bleeding/bruising, pain/swelling, infection, scar, incomplete removal, nerve damage/numbness, recurrence, and non-diagnostic biopsy, written consent, verbal consent and photographs were obtained. Time-out was performed. The area was cleaned with isopropyl alcohol. 0.5mL of 1% lidocaine with 1:100,000 epinephrine was injected to obtain adequate anesthesia of the lesion on the right neck.  A 8 mm punch biopsy was performed. 4-0 vicryl suture was used to approximate dermis. 4-0 prolene sutures (3 interrupted) were utilized to approximate the epidermal edges. White petroleum jelly/Vaseline and a bandage was applied to the wound. Explicit verbal and written wound care instructions were provided. The patient left the Dermatology Clinic in good condition. The patient was counseled to follow up for suture removal in approximately 10-14 days.      CC Miranda Bosch MD on close of this encounter.  Follow-up pending biopsy results, sooner for lesion of concern.       Staff Involved:  Scribe/Staff    Scribe Disclosure  I, Rebecca Glez, am serving as a scribe to document services personally performed by Dr. Laly Coates MD, based on data collection and the provider's statements to me.     Provider Disclosure:   The documentation recorded by the scribe accurately reflects the services I personally performed and the decisions made by me.    Laly Coates MD    Department of Dermatology  [facility]: Phone: [phone], Fax:[phone]  [facility]: Phone: [phone], Fax: [phone]

## 2019-06-06 NOTE — NURSING NOTE
Micki Santos's goals for this visit include:   Chief Complaint   Patient presents with     Procedure       She requests these members of her care team be copied on today's visit information: yes     PCP: Miranda Bosch    Referring Provider:  No referring provider defined for this encounter.    LMP 07/29/2003     Do you need any medication refills at today's visit? No     Amorrpiper Goins CMA

## 2019-06-10 LAB — COPATH REPORT: NORMAL

## 2019-09-28 ENCOUNTER — HEALTH MAINTENANCE LETTER (OUTPATIENT)
Age: 59
End: 2019-09-28

## 2019-12-05 ENCOUNTER — OFFICE VISIT (OUTPATIENT)
Dept: ORTHOPEDICS | Facility: OTHER | Age: 59
End: 2019-12-05
Payer: COMMERCIAL

## 2019-12-05 VITALS
SYSTOLIC BLOOD PRESSURE: 120 MMHG | WEIGHT: 245 LBS | DIASTOLIC BLOOD PRESSURE: 86 MMHG | BODY MASS INDEX: 41.83 KG/M2 | HEIGHT: 64 IN

## 2019-12-05 DIAGNOSIS — M22.42 CHONDROMALACIA OF LEFT PATELLOFEMORAL JOINT: Primary | ICD-10-CM

## 2019-12-05 DIAGNOSIS — M22.41 CHONDROMALACIA OF RIGHT PATELLOFEMORAL JOINT: ICD-10-CM

## 2019-12-05 PROCEDURE — 20610 DRAIN/INJ JOINT/BURSA W/O US: CPT | Mod: 50 | Performed by: ORTHOPAEDIC SURGERY

## 2019-12-05 RX ORDER — TRIAMCINOLONE ACETONIDE 40 MG/ML
40 INJECTION, SUSPENSION INTRA-ARTICULAR; INTRAMUSCULAR ONCE
Status: COMPLETED | OUTPATIENT
Start: 2019-12-05 | End: 2019-12-05

## 2019-12-05 RX ADMIN — TRIAMCINOLONE ACETONIDE 40 MG: 40 INJECTION, SUSPENSION INTRA-ARTICULAR; INTRAMUSCULAR at 15:35

## 2019-12-05 RX ADMIN — TRIAMCINOLONE ACETONIDE 40 MG: 40 INJECTION, SUSPENSION INTRA-ARTICULAR; INTRAMUSCULAR at 15:34

## 2019-12-05 ASSESSMENT — PAIN SCALES - GENERAL: PAINLEVEL: MODERATE PAIN (5)

## 2019-12-05 ASSESSMENT — MIFFLIN-ST. JEOR: SCORE: 1658.37

## 2019-12-05 NOTE — LETTER
"    12/5/2019         RE: Micki Santos  [address] [place] [facility] [address]        Dear Colleague,    Thank you for referring your patient, Micki Santos, to the [facility]. Please see a copy of my visit note below.    Office Visit-Follow up    Chief Complaint: Micki Santos is a 59 year old female who is being seen for   Chief Complaint   Patient presents with     RECHECK     bilateral knee pain probable early primary osteoarthritis with some patellar maltracking       History of Present Illness:   Today's visit:  Returns for bilateral knees.  Essentially the same pain as before.  She does feel a slight catch in the right knee.  No new traumas or injuries.  Previous injections were very helpful.  She is also changed her diet and nutrition.  She is \"cut out carbs and eats lots of greens\".  She also works exercising and walking.    May 15, 2019 visit:  Presents with bilateral knee pain.  Left worse than the right.  No specific traumas or injuries.  Worse in the morning will improve throughout the day.  Ibuprofen is been helpful.  Tylenol has not helped.  No previous injections or therapies.  Complains of generalized pain but more significantly along the posterior aspect.      REVIEW OF SYSTEMS  General: negative for, night sweats, dizziness, fatigue  Resp: No shortness of breath and no cough  CV: negative for chest pain, syncope or near-syncope  GI: negative for nausea, vomiting and diarrhea  : negative for dysuria and hematuria  Musculoskeletal: as above  Neurologic: negative for syncope   Hematologic: negative for bleeding disorder    Physical Exam:  Vitals: /86   Ht 1.613 m (5' 3.5\")   Wt 111.1 kg (245 lb)   [facility] 07/29/2003   BMI 42.72 kg/m     BMI= Body mass index is 42.72 kg/m .  Constitutional: healthy, alert and no acute distress   Psychiatric: mentation appears normal and affect normal/bright  NEURO: no focal deficits  RESP: Normal with easy respirations " and no use of accessory muscles to breathe, no audible wheezing or retractions  CV: bilateral lower extremity:   no edema         SKIN: No erythema, rashes, excoriation, or breakdown. No evidence of infection.   JOINT/EXTREMITIES:bilateral knee: No deformity.  Some joint line tenderness.  No effusion.  No instability.  GAIT: not tested             Diagnostic Modalities:  Previous imaging reviewed.  Independent visualization of the images was performed.      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                                          INJECTION PROCEDURE:  The patient was counseled about an  injection, including discussion of risks (including infection), contents of the injection, rationale for performing the injection, and expected benefits of the injection. The skin was prepped with alcohol and betadine and then utilizing sterile technique an injection of the bilateral knee joint from the anterolateral approach in the seated position was performed. The injection consisted 1ml of Kenalog (40mg per 1 ml) mixed with 3ml of 0.5% Marcaine. The patient tolerated the injection well, and there were no complications. The injection site was covered with a Band-Aid. The injection was performed by Sampson Hutchison, APRN, CNP, DNP    Previous injection was helpful.  We will repeat injections today.  She also continues to work on weight loss.  She also continues exercise.      Return to clinic 4-6 , week(s), PRN, or sooner as needed for changes.  Re-x-ray on return: No    Alberto Nice D.O.          Clinic Administered Medication Documentation    MEDICATION LIST:   Injection Documentation     Injection was administered by provider (please see MAR for given by information). Please see MAR and medication order for additional information.     Site: Joint injection   Medication Used: Kenalog  40mg    Exp: 5/2021  The following medication was given by Domenic Hutchison, APRN, CNP, DNP:     MEDICATION: Kenalog 40mg/1ml  ROUTE: Joint Injection  SITE: bilateral knee  DOSE: 1 mL  LOT #: [id number]  : N-Sided  EXPIRATION DATE:  5/2021  NDC: [address]-8                            Again, thank you for allowing me to participate in the care of your patient.        Sincerely,        Nitin Nice, DO

## 2019-12-05 NOTE — PROGRESS NOTES
Clinic Administered Medication Documentation    MEDICATION LIST:   Injection Documentation     Injection was administered by provider (please see MAR for given by information). Please see MAR and medication order for additional information.     Site: Joint injection   Medication Used: Kenalog 40mg    Exp: 5/2021  The following medication was given by BRIGITTE Leon, CNP, DNP:     MEDICATION: Kenalog 40mg/1ml  ROUTE: Joint Injection  SITE: bilateral knee  DOSE: 1 mL  LOT #: [id number]  : One2start  EXPIRATION DATE:  5/2021  NDC: [address]-8

## 2019-12-05 NOTE — PROGRESS NOTES
"Office Visit-Follow up    Chief Complaint: Micki Santos is a 59 year old female who is being seen for   Chief Complaint   Patient presents with     RECHECK     bilateral knee pain probable early primary osteoarthritis with some patellar maltracking       History of Present Illness:   Today's visit:  Returns for bilateral knees.  Essentially the same pain as before.  She does feel a slight catch in the right knee.  No new traumas or injuries.  Previous injections were very helpful.  She is also changed her diet and nutrition.  She is \"cut out carbs and eats lots of greens\".  She also works exercising and walking.    May 15, 2019 visit:  Presents with bilateral knee pain.  Left worse than the right.  No specific traumas or injuries.  Worse in the morning will improve throughout the day.  Ibuprofen is been helpful.  Tylenol has not helped.  No previous injections or therapies.  Complains of generalized pain but more significantly along the posterior aspect.      REVIEW OF SYSTEMS  General: negative for, night sweats, dizziness, fatigue  Resp: No shortness of breath and no cough  CV: negative for chest pain, syncope or near-syncope  GI: negative for nausea, vomiting and diarrhea  : negative for dysuria and hematuria  Musculoskeletal: as above  Neurologic: negative for syncope   Hematologic: negative for bleeding disorder    Physical Exam:  Vitals: /86   Ht 1.613 m (5' 3.5\")   Wt 111.1 kg (245 lb)   LMP 07/29/2003   BMI 42.72 kg/m    BMI= Body mass index is 42.72 kg/m .  Constitutional: healthy, alert and no acute distress   Psychiatric: mentation appears normal and affect normal/bright  NEURO: no focal deficits  RESP: Normal with easy respirations and no use of accessory muscles to breathe, no audible wheezing or retractions  CV: bilateral lower extremity:   no edema         SKIN: No erythema, rashes, excoriation, or breakdown. No evidence of infection.   JOINT/EXTREMITIES:bilateral knee: No " deformity.  Some joint line tenderness.  No effusion.  No instability.  GAIT: not tested             Diagnostic Modalities:  Previous imaging reviewed.  Independent visualization of the images was performed.      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                                          INJECTION PROCEDURE:  The patient was counseled about an  injection, including discussion of risks (including infection), contents of the injection, rationale for performing the injection, and expected benefits of the injection. The skin was prepped with alcohol and betadine and then utilizing sterile technique an injection of the bilateral knee joint from the anterolateral approach in the seated position was performed. The injection consisted 1ml of Kenalog (40mg per 1 ml) mixed with 3ml of 0.5% Marcaine. The patient tolerated the injection well, and there were no complications. The injection site was covered with a Band-Aid. The injection was performed by BRIGITTE Reyes, CNP, DNP    Previous injection was helpful.  We will repeat injections today.  She also continues to work on weight loss.  She also continues exercise.      Return to clinic 4-6 , week(s), PRN, or sooner as needed for changes.  Re-x-ray on return: No    Alberto Nice D.O.

## 2019-12-19 DIAGNOSIS — G47.33 OSA (OBSTRUCTIVE SLEEP APNEA): Primary | ICD-10-CM

## 2020-01-22 ENCOUNTER — MYC REFILL (OUTPATIENT)
Dept: FAMILY MEDICINE | Facility: OTHER | Age: 60
End: 2020-01-22

## 2020-01-22 DIAGNOSIS — I10 ESSENTIAL HYPERTENSION WITH GOAL BLOOD PRESSURE LESS THAN 140/90: ICD-10-CM

## 2020-01-22 DIAGNOSIS — E03.9 HYPOTHYROIDISM, UNSPECIFIED TYPE: ICD-10-CM

## 2020-01-22 DIAGNOSIS — J31.0 CHRONIC RHINITIS: ICD-10-CM

## 2020-01-22 DIAGNOSIS — K21.9 GASTROESOPHAGEAL REFLUX DISEASE WITHOUT ESOPHAGITIS: ICD-10-CM

## 2020-01-22 DIAGNOSIS — F33.0 MAJOR DEPRESSIVE DISORDER, RECURRENT EPISODE, MILD (H): ICD-10-CM

## 2020-01-22 RX ORDER — MOMETASONE FUROATE MONOHYDRATE 50 UG/1
2 SPRAY, METERED NASAL DAILY
Qty: 1 BOX | Refills: 2 | Status: SHIPPED | OUTPATIENT
Start: 2020-01-22 | End: 2020-05-07

## 2020-01-23 RX ORDER — LISINOPRIL 20 MG/1
20 TABLET ORAL DAILY
Qty: 90 TABLET | Refills: 0 | Status: SHIPPED | OUTPATIENT
Start: 2020-01-23 | End: 2020-05-07

## 2020-01-23 RX ORDER — PANTOPRAZOLE SODIUM 40 MG/1
40 TABLET, DELAYED RELEASE ORAL DAILY
Qty: 90 TABLET | Refills: 0 | Status: SHIPPED | OUTPATIENT
Start: 2020-01-23 | End: 2020-05-07

## 2020-01-23 RX ORDER — ATENOLOL 50 MG/1
50 TABLET ORAL DAILY
Qty: 90 TABLET | Refills: 0 | Status: SHIPPED | OUTPATIENT
Start: 2020-01-23 | End: 2020-05-07

## 2020-01-23 RX ORDER — SERTRALINE HYDROCHLORIDE 100 MG/1
TABLET, FILM COATED ORAL
Qty: 135 TABLET | Refills: 0 | Status: SHIPPED | OUTPATIENT
Start: 2020-01-23 | End: 2020-05-07

## 2020-01-23 RX ORDER — LEVOTHYROXINE SODIUM 137 UG/1
137 TABLET ORAL DAILY
Qty: 90 TABLET | Refills: 0 | Status: SHIPPED | OUTPATIENT
Start: 2020-01-23 | End: 2020-05-07

## 2020-01-24 NOTE — TELEPHONE ENCOUNTER
PIERO REFILL: Medication is being filled for 1 time refill only due to:  Needs appointment for further refills. Note sent on sig.    Florin Page RN, BSN

## 2020-03-01 ENCOUNTER — E-VISIT (OUTPATIENT)
Dept: FAMILY MEDICINE | Facility: OTHER | Age: 60
End: 2020-03-01
Payer: COMMERCIAL

## 2020-03-01 DIAGNOSIS — J01.11 ACUTE RECURRENT FRONTAL SINUSITIS: Primary | ICD-10-CM

## 2020-03-01 PROCEDURE — 99421 OL DIG E/M SVC 5-10 MIN: CPT | Performed by: FAMILY MEDICINE

## 2020-03-02 NOTE — TELEPHONE ENCOUNTER
Provider E-Visit time total (minutes): 5 min  Needs pharmacy, otherwise [facility] set to be completed.  Augmentin, instructions, etc  Dora Gómez MD

## 2020-03-02 NOTE — PATIENT INSTRUCTIONS
Thank you for choosing us for your care. I have placed an order for a prescription so that you can start treatment. View your full visit summary for details by clicking on the link below. Your pharmacist will able to address any questions you may have about the medication.     If you're not feeling better within 5-7 days, please schedule an appointment.  You can schedule an appointment right here in [place], or call [phone]  If the visit is for the same symptoms as your e-visit, we'll refund the cost of your e-visit if seen within seven days.

## 2020-05-05 DIAGNOSIS — I10 ESSENTIAL HYPERTENSION WITH GOAL BLOOD PRESSURE LESS THAN 140/90: ICD-10-CM

## 2020-05-05 DIAGNOSIS — K21.9 GASTROESOPHAGEAL REFLUX DISEASE WITHOUT ESOPHAGITIS: ICD-10-CM

## 2020-05-05 DIAGNOSIS — F33.0 MAJOR DEPRESSIVE DISORDER, RECURRENT EPISODE, MILD (H): ICD-10-CM

## 2020-05-05 DIAGNOSIS — E03.9 HYPOTHYROIDISM, UNSPECIFIED TYPE: ICD-10-CM

## 2020-05-06 NOTE — TELEPHONE ENCOUNTER
Left message for patient to return call. When call is returned, please assist with scheduling.   Suad Oh MA

## 2020-05-06 NOTE — TELEPHONE ENCOUNTER
Pending Prescriptions:                       Disp   Refills    levothyroxine (SYNTHROID/LEVOTHROID) 137 M*90 tab*0        Sig: TAKE 1 TABLET(137 MCG) BY MOUTH DAILY    pantoprazole (PROTONIX) 40 MG EC tablet [P*90 tab*0        Sig: TAKE 1 TABLET(40 MG) BY MOUTH DAILY    lisinopril (ZESTRIL) 20 MG tablet [Pharmac*90 tab*0        Sig: TAKE 1 TABLET(20 MG) BY MOUTH DAILY    atenolol (TENORMIN) 50 MG tablet [Pharmacy*90 tab*0        Sig: TAKE 1 TABLET(50 MG) BY MOUTH DAILY    sertraline (ZOLOFT) 100 MG tablet [Pharmac*135 ta*0        Sig: TAKE 1 AND 1/2 TABLETS BY MOUTH DAILY    Please call patient to schedule a virtual visit. (Video, Phone or E-visit) due for yearly and labs  Also, ask if pt will need a refill before the visit, reflag for RN to give a shane or remove the medication.    Thank you!    Florin Page, RN, BSN

## 2020-05-07 ENCOUNTER — E-VISIT (OUTPATIENT)
Dept: FAMILY MEDICINE | Facility: OTHER | Age: 60
End: 2020-05-07

## 2020-05-07 ENCOUNTER — VIRTUAL VISIT (OUTPATIENT)
Dept: FAMILY MEDICINE | Facility: OTHER | Age: 60
End: 2020-05-07
Payer: COMMERCIAL

## 2020-05-07 DIAGNOSIS — K21.9 GASTROESOPHAGEAL REFLUX DISEASE WITHOUT ESOPHAGITIS: ICD-10-CM

## 2020-05-07 DIAGNOSIS — I10 HYPERTENSION, GOAL BELOW 140/90: ICD-10-CM

## 2020-05-07 DIAGNOSIS — E66.01 MORBID OBESITY, UNSPECIFIED OBESITY TYPE (H): ICD-10-CM

## 2020-05-07 DIAGNOSIS — F33.0 MAJOR DEPRESSIVE DISORDER, RECURRENT EPISODE, MILD (H): ICD-10-CM

## 2020-05-07 DIAGNOSIS — I10 ESSENTIAL HYPERTENSION WITH GOAL BLOOD PRESSURE LESS THAN 140/90: Primary | ICD-10-CM

## 2020-05-07 DIAGNOSIS — I10 ESSENTIAL HYPERTENSION WITH GOAL BLOOD PRESSURE LESS THAN 140/90: ICD-10-CM

## 2020-05-07 DIAGNOSIS — E03.9 HYPOTHYROIDISM, UNSPECIFIED TYPE: ICD-10-CM

## 2020-05-07 DIAGNOSIS — F32.0 MILD MAJOR DEPRESSION (H): ICD-10-CM

## 2020-05-07 DIAGNOSIS — J31.0 CHRONIC RHINITIS: ICD-10-CM

## 2020-05-07 PROCEDURE — 99214 OFFICE O/P EST MOD 30 MIN: CPT | Mod: 95 | Performed by: FAMILY MEDICINE

## 2020-05-07 PROCEDURE — 99207 ZZC NO BILLABLE SERVICE THIS VISIT: CPT | Performed by: FAMILY MEDICINE

## 2020-05-07 RX ORDER — MOMETASONE FUROATE MONOHYDRATE 50 UG/1
2 SPRAY, METERED NASAL DAILY
Qty: 1 BOX | Refills: 2 | Status: SHIPPED | OUTPATIENT
Start: 2020-05-07 | End: 2021-06-08

## 2020-05-07 RX ORDER — ATENOLOL 50 MG/1
50 TABLET ORAL DAILY
Qty: 90 TABLET | Refills: 0 | Status: SHIPPED | OUTPATIENT
Start: 2020-05-07 | End: 2020-08-28

## 2020-05-07 RX ORDER — PANTOPRAZOLE SODIUM 20 MG/1
40 TABLET, DELAYED RELEASE ORAL DAILY
Qty: 90 TABLET | Refills: 0 | Status: SHIPPED | OUTPATIENT
Start: 2020-05-07 | End: 2020-05-08

## 2020-05-07 RX ORDER — LEVOTHYROXINE SODIUM 137 UG/1
137 TABLET ORAL DAILY
Qty: 90 TABLET | Refills: 0 | Status: SHIPPED | OUTPATIENT
Start: 2020-05-07 | End: 2020-08-28

## 2020-05-07 RX ORDER — LISINOPRIL 20 MG/1
20 TABLET ORAL DAILY
Qty: 90 TABLET | Refills: 0 | Status: SHIPPED | OUTPATIENT
Start: 2020-05-07 | End: 2020-08-28

## 2020-05-07 RX ORDER — SERTRALINE HYDROCHLORIDE 100 MG/1
TABLET, FILM COATED ORAL
Qty: 135 TABLET | Refills: 0 | Status: SHIPPED | OUTPATIENT
Start: 2020-05-07 | End: 2020-09-02

## 2020-05-07 ASSESSMENT — ANXIETY QUESTIONNAIRES
2. NOT BEING ABLE TO STOP OR CONTROL WORRYING: NOT AT ALL
5. BEING SO RESTLESS THAT IT IS HARD TO SIT STILL: NOT AT ALL
3. WORRYING TOO MUCH ABOUT DIFFERENT THINGS: NOT AT ALL
6. BECOMING EASILY ANNOYED OR IRRITABLE: NOT AT ALL
7. FEELING AFRAID AS IF SOMETHING AWFUL MIGHT HAPPEN: NOT AT ALL
IF YOU CHECKED OFF ANY PROBLEMS ON THIS QUESTIONNAIRE, HOW DIFFICULT HAVE THESE PROBLEMS MADE IT FOR YOU TO DO YOUR WORK, TAKE CARE OF THINGS AT HOME, OR GET ALONG WITH OTHER PEOPLE: NOT DIFFICULT AT ALL
1. FEELING NERVOUS, ANXIOUS, OR ON EDGE: NOT AT ALL
GAD7 TOTAL SCORE: 0

## 2020-05-07 ASSESSMENT — PATIENT HEALTH QUESTIONNAIRE - PHQ9
SUM OF ALL RESPONSES TO PHQ QUESTIONS 1-9: 3
5. POOR APPETITE OR OVEREATING: NOT AT ALL

## 2020-05-07 NOTE — PROGRESS NOTES
"Micki Santos is a 59 year old female who is being evaluated via a billable video visit.      The patient has been notified of following:     \"This video visit will be conducted via a call between you and your physician/provider. We have found that certain health care needs can be provided without the need for an in-person physical exam.  This service lets us provide the care you need with a video conversation.  If a prescription is necessary we can send it directly to your pharmacy.  If lab work is needed we can place an order for that and you can then stop by our lab to have the test done at a later time.    Video visits are billed at different rates depending on your insurance coverage.  Please reach out to your insurance provider with any questions.    If during the course of the call the physician/provider feels a video visit is not appropriate, you will not be charged for this service.\"    Patient has given verbal consent for Video visit? Yes    How would you like to obtain your AVS? Gwen    Patient would like the video invitation sent by: Send to e-mail at: [email or web address]    Will anyone else be joining your video visit? No      Subjective     Micki Santos is a 59 year old female who presents to clinic today for the following health issues:    HPI     Hypertension Follow-up    Do you check your blood pressure regularly outside of the clinic? Yes     Are you following a low salt diet? Yes    Are your blood pressures ever more than 140 on the top number (systolic) OR more   than 90 on the bottom number (diastolic), for example 140/90? No    Depression Followup    How are you doing with your depression since your last visit? No change    Are you having other symptoms that might be associated with depression? No    Have you had a significant life event?  Health Concerns     Are you feeling anxious or having panic attacks?   No    Do you have any concerns with your use of alcohol or other drugs? " No    Social History     Tobacco Use     Smoking status: Never Smoker     Smokeless tobacco: Never Used     Tobacco comment: No smokers in home   Substance Use Topics     Alcohol use: Yes     Comment: 1-2 glasses of wine/week     Drug use: No     PHQ 11/19/2018 4/29/2019 5/7/2020   PHQ-9 Total Score 1 3 3   Q9: Thoughts of better off dead/self-harm past 2 weeks Not at all Not at all Not at all     FATMATA-7 SCORE 11/19/2018 4/29/2019 5/7/2020   Total Score - - -   Total Score 0 (minimal anxiety) 2 (minimal anxiety) -   Total Score 0 2 0     In the past two weeks have you had thoughts of suicide or self-harm?  No.    Do you have concerns about your personal safety or the safety of others?   No    Suicide Assessment Five-step Evaluation and Treatment (SAFE-T)    Hypothyroidism Follow-up    Since last visit, patient describes the following symptoms: tremors and fatigue      How many servings of fruits and vegetables do you eat daily?  4 or more    On average, how many sweetened beverages do you drink each day (Examples: soda, juice, sweet tea, etc.  Do NOT count diet or artificially sweetened beverages)?   0    How many days per week do you exercise enough to make your heart beat faster? 7    How many minutes a day do you exercise enough to make your heart beat faster? 60 or more    How many days per week do you miss taking your medication? 0       Video Start Time: 3:18PM    -------------------------------------    Patient Active Problem List   Diagnosis     Esophageal reflux     Mild major depression (H)     Pain in thoracic spine     Hypertension, goal below 140/90     Family history of skin cancer     Multiple nevi     Hypothyroidism     Morbid obesity, unspecified obesity type (H)     Fatigue, unspecified type     LEROY (obstructive sleep apnea)     Cervical cancer screening     Past Surgical History:   Procedure Laterality Date     C REMOVAL OF OVARY(S)      left ovary removed-cyst, uterine fibroid removed      CHOLECYSTECTOMY, LAPOROSCOPIC  06    Cholecystectomy, Laparoscopic     COLONOSCOPY N/A 2019    Procedure: Colonoscopy, With  Biopsy via forceps;  Surgeon: Gayle Morales MD;  Location:  GI     ESOPHAGOSCOPY, GASTROSCOPY, DUODENOSCOPY (EGD), COMBINED N/A 2016    Procedure: COMBINED ESOPHAGOSCOPY, GASTROSCOPY, DUODENOSCOPY (EGD), BIOPSY SINGLE OR MULTIPLE;  Surgeon: Del Betts MD;  Location:  GI     GENITOURINARY SURGERY      removal of left ovary with cyst and uterine fibroid     HC REMOVAL OF TONSILS,12+ Y/O      Tonsils 11+y.o.     HC UGI ENDOSCOPY, SIMPLE EXAM  09       Social History     Tobacco Use     Smoking status: Never Smoker     Smokeless tobacco: Never Used     Tobacco comment: No smokers in home   Substance Use Topics     Alcohol use: Yes     Comment: 1-2 glasses of wine/week     Family History   Problem Relation Age of Onset     C.A.D. Maternal Grandmother      C.A.D. Maternal Grandfather      C.A.D. Paternal Grandfather          age 38     Respiratory Mother         sarcoidosis     Hypertension Mother         chronic     Depression Mother         10+ years     Colon Cancer Mother      Diabetes Father         Type II diabetes     Coronary Artery Disease Father         Heart Attack with Angioplasty/Stent     Arthritis No family hx of          Current Outpatient Medications   Medication Sig Dispense Refill     atenolol (TENORMIN) 50 MG tablet Take 1 tablet (50 mg) by mouth daily 90 tablet 0     levothyroxine (SYNTHROID/LEVOTHROID) 137 MCG tablet Take 1 tablet (137 mcg) by mouth daily 90 tablet 0     lisinopril (ZESTRIL) 20 MG tablet Take 1 tablet (20 mg) by mouth daily 90 tablet 0     mometasone (NASONEX) 50 MCG/ACT nasal spray Spray 2 sprays into both nostrils daily 1 Box 2     pantoprazole (PROTONIX) 20 MG EC tablet Take 1 tablet (20 mg) by mouth daily 90 tablet 0     sertraline (ZOLOFT) 100 MG tablet TAKE 1 & 1/2 TABLETS DAILY 135 tablet 0     Allergies  "  Allergen Reactions     No Known Drug Allergies      Recent Labs   Lab Test 04/30/19  1451 12/04/18  1007 07/30/18  1111  05/14/18  1648 10/11/16  1103   LDL  --   --   --   --   --  178*   HDL  --   --   --   --   --  59   TRIG  --   --   --   --   --  200*   ALT  --  24 22  --  28  --    CR  --  0.88 0.80  --  0.88 0.88   GFRESTIMATED  --  65 73  --  66 66   GFRESTBLACK  --  79 89  --  80 80   POTASSIUM  --  4.4 4.5  --  4.6 4.8   TSH 2.80 4.39* 0.17*   < > 9.02* 4.80*    < > = values in this interval not displayed.      BP Readings from Last 3 Encounters:   12/05/19 120/86   05/15/19 124/80   04/30/19 136/86    Wt Readings from Last 3 Encounters:   12/05/19 111.1 kg (245 lb)   05/15/19 114.3 kg (252 lb)   04/30/19 112.9 kg (249 lb)                    Reviewed and updated as needed this visit by Provider         Review of Systems   ROS COMP: Constitutional, HEENT, cardiovascular, pulmonary, GI, , musculoskeletal, neuro, skin, endocrine and psych systems are negative, except as otherwise noted.      Objective    LMP 07/29/2003   Estimated body mass index is 42.72 kg/m  as calculated from the following:    Height as of 12/5/19: 1.613 m (5' 3.5\").    Weight as of 12/5/19: 111.1 kg (245 lb).  Physical Exam     GENERAL: healthy, alert and no distress  EYES: Eyes grossly normal to inspection, conjunctivae and sclerae normal  RESP: no audible wheeze, cough, or visible cyanosis.  No visible retractions or increased work of breathing.  Able to speak fully in complete sentences.  NEURO: Cranial nerves grossly intact, mentation intact and speech normal  PSYCH: mentation appears normal, affect normal/bright, judgement and insight intact, normal speech and appearance well-groomed      Diagnostic Test Results:  Labs reviewed in Epic        Assessment & Plan   Problem List Items Addressed This Visit     Esophageal reflux     Trial a reduced dose of pantoprazole due to longterm effects. Will consider taking off completely if " "she tolerates this dose         Relevant Medications    pantoprazole (PROTONIX) 20 MG EC tablet    Mild major depression (H)     Stable. Has been difficult as she has not been able to visit her first grandson born 4 weeks ago. Declines therapy. Continue zoloft at the current dose. Refill meds         Relevant Medications    sertraline (ZOLOFT) 100 MG tablet    Hypertension, goal below 140/90     well controlled  Continue atenolol and lisinopril  Refill meds  Recheck in 3 month         Relevant Medications    atenolol (TENORMIN) 50 MG tablet    lisinopril (ZESTRIL) 20 MG tablet    Hypothyroidism     Recheck TSH. No new symptoms. Refill meds         Relevant Medications    levothyroxine (SYNTHROID/LEVOTHROID) 137 MCG tablet    Other Relevant Orders    TSH with free T4 reflex    Morbid obesity, unspecified obesity type (H)     Encouraged regular exercise and weight loss           Other Visit Diagnoses     Essential hypertension with goal blood pressure less than 140/90        Relevant Medications    atenolol (TENORMIN) 50 MG tablet    lisinopril (ZESTRIL) 20 MG tablet    Other Relevant Orders    Lipid panel reflex to direct LDL Fasting    Comprehensive metabolic panel (BMP + Alb, Alk Phos, ALT, AST, Total. Bili, TP)    Chronic rhinitis        Relevant Medications    mometasone (NASONEX) 50 MCG/ACT nasal spray    Major depressive disorder, recurrent episode, mild (H)        Relevant Medications    sertraline (ZOLOFT) 100 MG tablet             BMI:   Estimated body mass index is 42.72 kg/m  as calculated from the following:    Height as of 12/5/19: 1.613 m (5' 3.5\").    Weight as of 12/5/19: 111.1 kg (245 lb).           See Patient Instructions  No follow-ups on file.    Era Bedolla MD  [facility]      Video-Visit Details    Type of service:  Video Visit    Video End Time:3:31 PM    Originating Location (pt. Location): Home    Distant Location (provider location):  [facility] "     Platform used for Video Visit: Betsy    No follow-ups on file.       Era Bedolla MD

## 2020-05-07 NOTE — TELEPHONE ENCOUNTER
Next 5 appointments (look out 90 days)    May 07, 2020  2:40 PM CDT  Telephone Visit with [facility]olla MD  [facility] [address] [address]  [phone]        Neha Guardado RN, BSN

## 2020-05-08 RX ORDER — LISINOPRIL 20 MG/1
TABLET ORAL
Qty: 90 TABLET | Refills: 0 | OUTPATIENT
Start: 2020-05-08

## 2020-05-08 RX ORDER — SERTRALINE HYDROCHLORIDE 100 MG/1
TABLET, FILM COATED ORAL
Qty: 135 TABLET | Refills: 0 | OUTPATIENT
Start: 2020-05-08

## 2020-05-08 RX ORDER — ATENOLOL 50 MG/1
TABLET ORAL
Qty: 90 TABLET | Refills: 0 | OUTPATIENT
Start: 2020-05-08

## 2020-05-08 RX ORDER — LEVOTHYROXINE SODIUM 137 UG/1
TABLET ORAL
Qty: 90 TABLET | Refills: 0 | OUTPATIENT
Start: 2020-05-08

## 2020-05-08 RX ORDER — PANTOPRAZOLE SODIUM 20 MG/1
20 TABLET, DELAYED RELEASE ORAL DAILY
Qty: 90 TABLET | Refills: 0 | COMMUNITY
Start: 2020-05-08 | End: 2020-09-28

## 2020-05-08 RX ORDER — PANTOPRAZOLE SODIUM 40 MG/1
TABLET, DELAYED RELEASE ORAL
Qty: 90 TABLET | Refills: 0 | OUTPATIENT
Start: 2020-05-08

## 2020-05-08 ASSESSMENT — ANXIETY QUESTIONNAIRES: GAD7 TOTAL SCORE: 0

## 2020-05-08 NOTE — ASSESSMENT & PLAN NOTE
Stable. Has been difficult as she has not been able to visit her first grandson born 4 weeks ago. Declines therapy. Continue zoloft at the current dose. Refill meds

## 2020-05-08 NOTE — ASSESSMENT & PLAN NOTE
Trial a reduced dose of pantoprazole due to longterm effects. Will consider taking off completely if she tolerates this dose

## 2020-05-12 ENCOUNTER — MYC MEDICAL ADVICE (OUTPATIENT)
Dept: ORTHOPEDICS | Facility: OTHER | Age: 60
End: 2020-05-12

## 2020-05-12 NOTE — TELEPHONE ENCOUNTER
Routing to provider for further recommendations and to see if he would like new imaging done prior to scheduling an appointment.     Rafia Bran CMA on 5/12/2020 at 1:31 PM

## 2020-05-12 NOTE — TELEPHONE ENCOUNTER
I don't anticipate needing anything other than plain xrays and we can get those on the day of the appointment

## 2020-06-01 ENCOUNTER — OFFICE VISIT (OUTPATIENT)
Dept: ORTHOPEDICS | Facility: CLINIC | Age: 60
End: 2020-06-01
Payer: COMMERCIAL

## 2020-06-01 ENCOUNTER — ANCILLARY PROCEDURE (OUTPATIENT)
Dept: GENERAL RADIOLOGY | Facility: CLINIC | Age: 60
End: 2020-06-01
Attending: ORTHOPAEDIC SURGERY
Payer: COMMERCIAL

## 2020-06-01 VITALS
SYSTOLIC BLOOD PRESSURE: 134 MMHG | WEIGHT: 241.4 LBS | BODY MASS INDEX: 41.21 KG/M2 | DIASTOLIC BLOOD PRESSURE: 82 MMHG | HEIGHT: 64 IN

## 2020-06-01 DIAGNOSIS — M22.41 CHONDROMALACIA OF RIGHT PATELLOFEMORAL JOINT: Primary | ICD-10-CM

## 2020-06-01 DIAGNOSIS — M22.42 CHONDROMALACIA OF LEFT PATELLOFEMORAL JOINT: ICD-10-CM

## 2020-06-01 DIAGNOSIS — M22.41 CHONDROMALACIA OF RIGHT PATELLOFEMORAL JOINT: ICD-10-CM

## 2020-06-01 PROCEDURE — 99214 OFFICE O/P EST MOD 30 MIN: CPT | Mod: 25 | Performed by: ORTHOPAEDIC SURGERY

## 2020-06-01 PROCEDURE — 73564 X-RAY EXAM KNEE 4 OR MORE: CPT | Mod: TC

## 2020-06-01 PROCEDURE — 20610 DRAIN/INJ JOINT/BURSA W/O US: CPT | Mod: 50 | Performed by: ORTHOPAEDIC SURGERY

## 2020-06-01 RX ORDER — TRIAMCINOLONE ACETONIDE 40 MG/ML
40 INJECTION, SUSPENSION INTRA-ARTICULAR; INTRAMUSCULAR ONCE
Status: COMPLETED | OUTPATIENT
Start: 2020-06-01 | End: 2020-06-01

## 2020-06-01 RX ORDER — DICLOFENAC SODIUM 75 MG/1
75 TABLET, DELAYED RELEASE ORAL 2 TIMES DAILY
Qty: 30 TABLET | Refills: 0 | Status: SHIPPED | OUTPATIENT
Start: 2020-06-01 | End: 2020-10-30

## 2020-06-01 RX ADMIN — TRIAMCINOLONE ACETONIDE 40 MG: 40 INJECTION, SUSPENSION INTRA-ARTICULAR; INTRAMUSCULAR at 09:24

## 2020-06-01 ASSESSMENT — PAIN SCALES - GENERAL: PAINLEVEL: SEVERE PAIN (6)

## 2020-06-01 ASSESSMENT — MIFFLIN-ST. JEOR: SCORE: 1642.04

## 2020-06-01 NOTE — PROGRESS NOTES
"Office Visit-Follow up    Chief Complaint: Micki Santos is a 59 year old female who is being seen for   Chief Complaint   Patient presents with     RECHECK     bilateral knee patellofemoral chondromalacia/early osteoarthritis       History of Present Illness:   Today's visit:  Returns for right knee pain.  Left knee not as symptomatic mainly located along the anterior medial patellar facet.  No new traumas or injuries.  Although she has been working on getting the house ready for moving.  She is taking ibuprofen.  Worse with stairs.  December 5, 2019 visit:  Returns for bilateral knees.  Essentially the same pain as before.  She does feel a slight catch in the right knee.  No new traumas or injuries.  Previous injections were very helpful.  She is also changed her diet and nutrition.  She is \"cut out carbs and eats lots of greens\".  She also works exercising and walking.     May 15, 2019 visit:  Presents with bilateral knee pain.  Left worse than the right.  No specific traumas or injuries.  Worse in the morning will improve throughout the day.  Ibuprofen is been helpful.  Tylenol has not helped.  No previous injections or therapies.  Complains of generalized pain but more significantly along the posterior aspect.      Social History     Occupational History     Not on file   Tobacco Use     Smoking status: Never Smoker     Smokeless tobacco: Never Used     Tobacco comment: No smokers in home   Substance and Sexual Activity     Alcohol use: Yes     Comment: 1-2 glasses of wine/week     Drug use: No     Sexual activity: Yes     Partners: Male     Birth control/protection: Post-menopausal       REVIEW OF SYSTEMS  General: negative for, night sweats, dizziness, fatigue  Resp: No shortness of breath and no cough  CV: negative for chest pain, syncope or near-syncope  GI: negative for nausea, vomiting and diarrhea  : negative for dysuria and hematuria  Musculoskeletal: as above  Neurologic: negative for syncope " "  Hematologic: negative for bleeding disorder    Physical Exam:  Vitals: /82   Ht 1.613 m (5' 3.5\")   Wt 109.5 kg (241 lb 6.4 oz)   LMP 07/29/2003   BMI 42.09 kg/m    BMI= Body mass index is 42.09 kg/m .  Constitutional: healthy, alert and no acute distress   Psychiatric: mentation appears normal and affect normal/bright  NEURO: no focal deficits  RESP: Normal with easy respirations and no use of accessory muscles to breathe, no audible wheezing or retractions  CV: RLE: no edema         Regular rate and rhythm by palpation  SKIN: No erythema, rashes, excoriation, or breakdown. No evidence of infection.   JOINT/EXTREMITIES:right knee: Full active motion.  Patellofemoral crepitus with range of motion.  Exquisite tenderness with palpation of the medial patellar facet.  There is no joint line pain.  No instability with varus valgus testing.  No peripheral edema.  GAIT: not tested             Diagnostic Modalities:  bilateral knee X-ray: No fractures or dislocations.  Medial lateral compartments are fairly well-preserved.  Some subtle narrowing on the left lateral compartment.  Patellofemoral shows tilted patellas with some narrowing lateral compartment with some subtle subchondral cystic changes  Independent visualization of the images was performed.      Impression: right anterior knee pain-patellofemoral chondromalacia    Plan:  All of the above pertinent physical exam and imaging modalities findings was reviewed with Micki.                                          INJECTION PROCEDURE:  The patient was counseled about an  injection, including discussion of risks (including infection), contents of the injection, rationale for performing the injection, and expected benefits of the injection. The skin was prepped with alcohol and betadine and then utilizing sterile technique an injection of the bilateral knee joint from the anterolateral approach in the seated position was performed. The injection consisted 1ml " of Kenalog (40mg per 1 ml) mixed with 3ml of 0.25% Marcaine. The patient tolerated the injection well, and there were no complications. The injection site was covered with a Band-Aid. The injection was performed by BRIGITTE Reyes, TAMIE, DNP                                        NSAIDS RISKS:  I have prescribed an antiinflammatory medication.  We discussed that it is the same class of some the common over the counter medications (Ibuprofen, Advil, Motrin, Aleve, Naproxen, and  Naprosyn). I recommend to avoid taking these OTC's medication when taking the medication that I prescribed. This medication should be stopped if having stomach issues.    We discussed her treatment options.  Last injections really did not provide good relief.  We discussed repeat injections anti-inflammatories and physical therapy being the most important treatment.  I did place a referral for physical therapy and explained my rationale.  She did opt to proceed with injection again today.  Limitations discussed.      Return to clinic 4-6 , week(s), PRN, or sooner as needed for changes.  Re-x-ray on return: No    Alberto Nice D.O.

## 2020-06-01 NOTE — LETTER
"    6/1/2020         RE: Micki Santos  [address] [facility] [address]        Dear Colleague,    Thank you for referring your patient, Micki Santos, to the [facility]. Please see a copy of my visit note below.    Office Visit-Follow up    Chief Complaint: Micki Santos is a 59 year old female who is being seen for   Chief Complaint   Patient presents with     RECHECK     bilateral knee patellofemoral chondromalacia/early osteoarthritis       History of Present Illness:   Today's visit:  Returns for right knee pain.  Left knee not as symptomatic mainly located along the anterior medial patellar facet.  No new traumas or injuries.  Although she has been working on getting the house ready for moving.  She is taking ibuprofen.  Worse with stairs.  December 5, 2019 visit:  Returns for bilateral knees.  Essentially the same pain as before.  She does feel a slight catch in the right knee.  No new traumas or injuries.  Previous injections were very helpful.  She is also changed her diet and nutrition.  She is \"cut out carbs and eats lots of greens\".  She also works exercising and walking.     May 15, 2019 visit:  Presents with bilateral knee pain.  Left worse than the right.  No specific traumas or injuries.  Worse in the morning will improve throughout the day.  Ibuprofen is been helpful.  Tylenol has not helped.  No previous injections or therapies.  Complains of generalized pain but more significantly along the posterior aspect.      Social History     Occupational History     Not on file   Tobacco Use     Smoking status: Never Smoker     Smokeless tobacco: Never Used     Tobacco comment: No smokers in home   Substance and Sexual Activity     Alcohol use: Yes     Comment: 1-2 glasses of wine/week     Drug use: No     Sexual activity: Yes     Partners: Male     Birth control/protection: Post-menopausal       REVIEW OF SYSTEMS  General: negative for, night sweats, dizziness, " "fatigue  Resp: No shortness of breath and no cough  CV: negative for chest pain, syncope or near-syncope  GI: negative for nausea, vomiting and diarrhea  : negative for dysuria and hematuria  Musculoskeletal: as above  Neurologic: negative for syncope   Hematologic: negative for bleeding disorder    Physical Exam:  Vitals: /82   Ht 1.613 m (5' 3.5\")   Wt 109.5 kg (241 lb 6.4 oz)   LMP 07/29/2003   BMI 42.09 kg/m    BMI= Body mass index is 42.09 kg/m .  Constitutional: healthy, alert and no acute distress   Psychiatric: mentation appears normal and affect normal/bright  NEURO: no focal deficits  RESP: Normal with easy respirations and no use of accessory muscles to breathe, no audible wheezing or retractions  CV: RLE: no edema         Regular rate and rhythm by palpation  SKIN: No erythema, rashes, excoriation, or breakdown. No evidence of infection.   JOINT/EXTREMITIES:right knee: Full active motion.  Patellofemoral crepitus with range of motion.  Exquisite tenderness with palpation of the medial patellar facet.  There is no joint line pain.  No instability with varus valgus testing.  No peripheral edema.  GAIT: not tested             Diagnostic Modalities:  bilateral knee X-ray: No fractures or dislocations.  Medial lateral compartments are fairly well-preserved.  Some subtle narrowing on the left lateral compartment.  Patellofemoral shows tilted patellas with some narrowing lateral compartment with some subtle subchondral cystic changes  Independent visualization of the images was performed.      Impression: right anterior knee pain-patellofemoral chondromalacia    Plan:  All of the above pertinent physical exam and imaging modalities findings was reviewed with Micki.                                          INJECTION PROCEDURE:  The patient was counseled about an  injection, including discussion of risks (including infection), contents of the injection, rationale for performing the injection, and " expected benefits of the injection. The skin was prepped with alcohol and betadine and then utilizing sterile technique an injection of the bilateral knee joint from the anterolateral approach in the seated position was performed. The injection consisted 1ml of Kenalog (40mg per 1 ml) mixed with 3ml of 0.25% Marcaine. The patient tolerated the injection well, and there were no complications. The injection site was covered with a Band-Aid. The injection was performed by BRIGITTE Reyes CNP, DNP                                        NSAIDS RISKS:  I have prescribed an antiinflammatory medication.  We discussed that it is the same class of some the common over the counter medications (Ibuprofen, Advil, Motrin, Aleve, Naproxen, and  Naprosyn). I recommend to avoid taking these OTC's medication when taking the medication that I prescribed. This medication should be stopped if having stomach issues.    We discussed her treatment options.  Last injections really did not provide good relief.  We discussed repeat injections anti-inflammatories and physical therapy being the most important treatment.  I did place a referral for physical therapy and explained my rationale.  She did opt to proceed with injection again today.  Limitations discussed.      Return to clinic 4-6 , week(s), PRN, or sooner as needed for changes.  Re-x-ray on return: No    Alberto Nice D.O.          Clinic Administered Medication Documentation      Injection Documentation     Injection was administered by provider (please see MAR for given by information). Please see MAR and medication order for additional information.     Site: Joint injection   Medication Used: Kenalog 40mg   Expiration Date:  10/2021  The following medication was given by BRIGITTE Leon, CNP, BRAEDEN:     MEDICATION: Kenalog 40mg/1ml  ROUTE: Joint Injection  SITE: bilateral knee  DOSE: 1 mL  LOT #: [id number]  : [employer]  EXPIRATION DATE:  10/2021  NDC:  [address]-3                            Again, thank you for allowing me to participate in the care of your patient.        Sincerely,        Nitin Nice, DO

## 2020-06-01 NOTE — PROGRESS NOTES
Clinic Administered Medication Documentation      Injection Documentation     Injection was administered by provider (please see MAR for given by information). Please see MAR and medication order for additional information.     Site: Joint injection   Medication Used: Kenalog 40mg   Expiration Date:  10/2021  The following medication was given by BRIGITTE Leon, CNP, DNP:     MEDICATION: Kenalog 40mg/1ml  ROUTE: Joint Injection  SITE: bilateral knee  DOSE: 1 mL  LOT #: [id number]  : [employer]  EXPIRATION DATE:  10/2021  NDC: [address]-4

## 2020-06-02 ENCOUNTER — MYC MEDICAL ADVICE (OUTPATIENT)
Dept: ORTHOPEDICS | Facility: CLINIC | Age: 60
End: 2020-06-02

## 2020-06-02 NOTE — TELEPHONE ENCOUNTER
Patient was seen 06/01/2020 and had gotten bilateral knee injections.   Patient is not experiencing sever redness, rash on face and neck, and a headache. She states she has not taken anything since her injections.   She is looking for recommendations. Photo is attached to encounter through [facility] ? IO.     Routing to orthopedic provider in clinic to review.   Rafia Bran CMA on 6/2/2020 at 12:39 PM

## 2020-06-02 NOTE — TELEPHONE ENCOUNTER
I called the patient to discuss her reaction.  I had to leave her a voicemail and then I mycharted her.  You can see my response in mychart.

## 2020-08-27 ENCOUNTER — TELEPHONE (OUTPATIENT)
Dept: FAMILY MEDICINE | Facility: OTHER | Age: 60
End: 2020-08-27

## 2020-08-27 DIAGNOSIS — I10 ESSENTIAL HYPERTENSION WITH GOAL BLOOD PRESSURE LESS THAN 140/90: ICD-10-CM

## 2020-08-27 DIAGNOSIS — E03.9 HYPOTHYROIDISM, UNSPECIFIED TYPE: ICD-10-CM

## 2020-08-28 RX ORDER — LEVOTHYROXINE SODIUM 137 UG/1
137 TABLET ORAL DAILY
Qty: 30 TABLET | Refills: 0 | Status: SHIPPED | OUTPATIENT
Start: 2020-08-28 | End: 2020-09-28

## 2020-08-28 RX ORDER — ATENOLOL 50 MG/1
50 TABLET ORAL DAILY
Qty: 30 TABLET | Refills: 0 | Status: SHIPPED | OUTPATIENT
Start: 2020-08-28 | End: 2020-09-28

## 2020-08-28 RX ORDER — LISINOPRIL 20 MG/1
20 TABLET ORAL DAILY
Qty: 30 TABLET | Refills: 0 | Status: SHIPPED | OUTPATIENT
Start: 2020-08-28 | End: 2020-09-28

## 2020-08-28 NOTE — TELEPHONE ENCOUNTER
Pending Prescriptions:                       Disp   Refills    levothyroxine (SYNTHROID/LEVOTHROID) 137 M*90 tab*0        Sig: Take 1 tablet (137 mcg) by mouth daily    lisinopril (ZESTRIL) 20 MG tablet          90 tab*0        Sig: Take 1 tablet (20 mg) by mouth daily    atenolol (TENORMIN) 50 MG tablet           90 tab*0        Sig: Take 1 tablet (50 mg) by mouth daily    Routing refill request to provider for review/approval because:  labs

## 2020-09-02 DIAGNOSIS — F33.0 MAJOR DEPRESSIVE DISORDER, RECURRENT EPISODE, MILD (H): ICD-10-CM

## 2020-09-02 RX ORDER — SERTRALINE HYDROCHLORIDE 100 MG/1
TABLET, FILM COATED ORAL
Qty: 135 TABLET | Refills: 2 | Status: SHIPPED | OUTPATIENT
Start: 2020-09-02 | End: 2020-10-27

## 2020-09-11 ENCOUNTER — THERAPY VISIT (OUTPATIENT)
Dept: PHYSICAL THERAPY | Facility: CLINIC | Age: 60
End: 2020-09-11
Attending: ORTHOPAEDIC SURGERY
Payer: COMMERCIAL

## 2020-09-11 DIAGNOSIS — G89.29 CHRONIC PAIN OF BOTH KNEES: ICD-10-CM

## 2020-09-11 DIAGNOSIS — M25.562 CHRONIC PAIN OF BOTH KNEES: ICD-10-CM

## 2020-09-11 DIAGNOSIS — M22.41 CHONDROMALACIA OF RIGHT PATELLOFEMORAL JOINT: ICD-10-CM

## 2020-09-11 DIAGNOSIS — M22.42 CHONDROMALACIA OF LEFT PATELLOFEMORAL JOINT: ICD-10-CM

## 2020-09-11 DIAGNOSIS — M25.561 CHRONIC PAIN OF BOTH KNEES: ICD-10-CM

## 2020-09-11 PROCEDURE — 97110 THERAPEUTIC EXERCISES: CPT | Mod: GP | Performed by: PHYSICAL THERAPIST

## 2020-09-11 PROCEDURE — 97161 PT EVAL LOW COMPLEX 20 MIN: CPT | Mod: GP | Performed by: PHYSICAL THERAPIST

## 2020-09-11 ASSESSMENT — ACTIVITIES OF DAILY LIVING (ADL)
WALK: ACTIVITY IS MINIMALLY DIFFICULT
SWELLING: I HAVE THE SYMPTOM BUT IT DOES NOT AFFECT MY ACTIVITY
SQUAT: ACTIVITY IS VERY DIFFICULT
PAIN: THE SYMPTOM AFFECTS MY ACTIVITY SEVERELY
STIFFNESS: THE SYMPTOM AFFECTS MY ACTIVITY SLIGHTLY
GO UP STAIRS: ACTIVITY IS FAIRLY DIFFICULT
GIVING WAY, BUCKLING OR SHIFTING OF KNEE: THE SYMPTOM AFFECTS MY ACTIVITY SEVERELY
AS_A_RESULT_OF_YOUR_KNEE_INJURY,_HOW_WOULD_YOU_RATE_YOUR_CURRENT_LEVEL_OF_DAILY_ACTIVITY?: ABNORMAL
KNEEL ON THE FRONT OF YOUR KNEE: ACTIVITY IS FAIRLY DIFFICULT
WEAKNESS: THE SYMPTOM AFFECTS MY ACTIVITY MODERATELY
LIMPING: I HAVE THE SYMPTOM BUT IT DOES NOT AFFECT MY ACTIVITY
KNEE_ACTIVITY_OF_DAILY_LIVING_SUM: 38
RISE FROM A CHAIR: ACTIVITY IS SOMEWHAT DIFFICULT
GO DOWN STAIRS: ACTIVITY IS FAIRLY DIFFICULT
KNEE_ACTIVITY_OF_DAILY_LIVING_SCORE: 54.29
HOW_WOULD_YOU_RATE_THE_OVERALL_FUNCTION_OF_YOUR_KNEE_DURING_YOUR_USUAL_DAILY_ACTIVITIES?: ABNORMAL
SIT WITH YOUR KNEE BENT: ACTIVITY IS MINIMALLY DIFFICULT
HOW_WOULD_YOU_RATE_THE_CURRENT_FUNCTION_OF_YOUR_KNEE_DURING_YOUR_USUAL_DAILY_ACTIVITIES_ON_A_SCALE_FROM_0_TO_100_WITH_100_BEING_YOUR_LEVEL_OF_KNEE_FUNCTION_PRIOR_TO_YOUR_INJURY_AND_0_BEING_THE_INABILITY_TO_PERFORM_ANY_OF_YOUR_USUAL_DAILY_ACTIVITIES?: 50
STAND: ACTIVITY IS NOT DIFFICULT
RAW_SCORE: 38

## 2020-09-11 NOTE — PROGRESS NOTES
[facility] Initial Evaluation  Subjective:  The history is provided by the patient. No  was used.   Patient Health History  Micki Santos being seen for B knee pain and weakness.     Problem began: 3/11/2020.   Problem occurred: gradual   Pain is reported as 4/10 on pain scale.  General health as reported by patient is good.  Pertinent medical history includes: depression, high blood pressure, overweight and sleep disorder/apnea.   Red flags:  None as reported by patient.  Medical allergies: none.   Surgeries include:  None.    Current medications:  Anti-depressants, high blood pressure medication and thyroid medication.    Current occupation is retired.      Other job/home tasks details: home tasks.                Therapist Generated HPI Evaluation  Problem details: She has been dealing with B knee pain for the past 6 months.  Unknown cause.  The pain seemed to come on gradually.  She is having a lot of crepitis in B knees.  She did experience her R knee giving out 2 times when walking on a paved path.  She has more pain with squatting, stairs, and walking too much.  Feels better with rest and ibuprofen.  She will wake occasionally at night due to pain.  Knees are stiff in the AM.  The pain seems to increase throughout the day..         Type of problem:  Bilateral knees.    This is a chronic condition.  Condition occurred with:  Insidious onset.  Where condition occurred: for unknown reasons.  Patient reports pain:  Sub patellar, medial, lateral, posterior and in the joint.  Pain is described as aching and sharp (grinding feel) and is intermittent.  Radiates to: none.   Since onset symptoms are gradually worsening.  Associated symptoms:  Loss of motion/stiffness, buckling/giving out, loss of strength and catching. Symptoms are exacerbated by ascending stairs, certain positions, descending stairs, bending/squatting, walking, standing and kneeling  and relieved by  "NSAID's and rest.  Special tests included:  X-ray (lateral position of patella B).    Barriers include:  Stairs.           Knee Activity of Daily Living Score: 54.29            Objective:  System    Physical Exam    General     ROS  Micki Santos , : 1960, MRN: [id number]    Physical Therapy Objective Findings  Subjective information, goals, clinical impression, daily documentation and other information found in EPISODES tab.  Knee Objective Findings    Posture: norml    Gait:  pes planus and decreased R hip ext and R ankle DF    Foot Position in Standing:  Pes planus    Lumbar Screen: lumbar ROM: flex 90%, ext 70%, R SB 70%, L SB 70%,     Hip Screen: - scour B, - VALERIO B, - FADIR B    Range of Motion:                                    Right AROM            Right PROM Left AROM              Left PROM                Extension/flexion 0-120 wnl 0-123 wnl   other       Other:         Manual Muscle Testing  (graded 0-5, measured at 0 degrees unless otherwise noted):                                                                       Right                                                  Left                                                      Flexion      4    5   Extension 5 (+) 5 (++)   Hip Abduction 4 4   Quad Set     VMO     Other:   glute max  Hip ER   4-  4-   4  4   (+ mild pain, ++ moderate pain, +++ severe pain)    Edema:                                                                        Right                                                  Left                                                               Special Tests:                                                                    Right                                                   Left                                                      Step Test Height 4\" 4\"         -Control Comment Mod medial collapse Significant medial collapse   Functional Squat (deg.) 65 - significant crepitis  Significant ant translation knee 65 - " "significant crepitis  signifcant ant translation knee   Lachmans - -   Posterior Sag     Anterior Drawer     Posterior Drawer     Varus at 0 & 30 - -   Valgus at 0 & 30 - -   Willy's Mild + lateral Mild + lateral   Apley's Distraction     Apley's Compression     External Rotation Test     OTHER: patellar grind  + +     Flexibility:                                                                    Right                                                   Left                                                      Gastroc normal normal   Soleus     Hamstrings SLR 80 SLR 80   Hip Flexors     Quadricep Mild tightness Mild tightness   ITB normal parth          Joint Mobility                                                                       Right                                                   Left                                                       Patellar Static Position Lateral tilt Lateral tilt   Patellar Passive Mobility Hypomobile medial glide Hypomobile medial glide   Patellar Dynamic Mobility Mod lateral tracking Significant lateral tracking   Proximal Tib-fib     Tibiofemoral     Garcia Taping Decreased crepitis and pain with 4\" ant step down with medial glide decreased crepitis and pain with 4\" ant step down with medial glide     Palpation: tender lateral patellar facet L>R    Assessment/Plan:    Patient is a 59 year old female with both sides knee complaints.    Patient has the following significant findings with corresponding treatment plan.                Diagnosis 1:  B knee pain consistent with PFPS from lateral tracking patella    Pain -  hot/cold therapy, manual therapy, self management, education and home program  Decreased joint mobility - manual therapy, therapeutic exercise, therapeutic activity and home program  Decreased strength - therapeutic exercise, therapeutic activities and home program  Impaired gait - gait training and home program  Decreased function - therapeutic activities and " home program    Therapy Evaluation Codes:   1) History comprised of:   Personal factors that impact the plan of care:      Age and Time since onset of symptoms.    Comorbidity factors that impact the plan of care are:      Depression and Overweight.     Medications impacting care: Anti-inflammatory.  2) Examination of Body Systems comprised of:   Body structures and functions that impact the plan of care:      Knee.   Activity limitations that impact the plan of care are:      Lifting, Running, Sports, Squatting/kneeling, Stairs, Standing, Walking, Working and Sleeping.  3) Clinical presentation characteristics are:   Stable/Uncomplicated.  4) Decision-Making    Low complexity using standardized patient assessment instrument and/or measureable assessment of functional outcome.  Cumulative Therapy Evaluation is: Low complexity.    Previous and current functional limitations:  (See Goal Flow Sheet for this information)    Short term and Long term goals: (See Goal Flow Sheet for this information)     Communication ability:  Patient appears to be able to clearly communicate and understand verbal and written communication and follow directions correctly.  Treatment Explanation - The following has been discussed with the patient:   RX ordered/plan of care  Anticipated outcomes  Possible risks and side effects  This patient would benefit from PT intervention to resume normal activities.   Rehab potential is good.    Frequency:  1 X week, once daily  Duration:  for 6 weeks  Discharge Plan:  Achieve all LTG.  Independent in home treatment program.  Reach maximal therapeutic benefit.    Please refer to the daily flowsheet for treatment today, total treatment time and time spent performing 1:1 timed codes.     Hermann Gutierrez,PT, DPT, OCS

## 2020-09-23 DIAGNOSIS — I10 ESSENTIAL HYPERTENSION WITH GOAL BLOOD PRESSURE LESS THAN 140/90: ICD-10-CM

## 2020-09-23 DIAGNOSIS — E03.9 HYPOTHYROIDISM, UNSPECIFIED TYPE: ICD-10-CM

## 2020-09-23 LAB
ALBUMIN SERPL-MCNC: 3.9 G/DL (ref 3.4–5)
ALP SERPL-CCNC: 120 U/L (ref 40–150)
ALT SERPL W P-5'-P-CCNC: 23 U/L (ref 0–50)
ANION GAP SERPL CALCULATED.3IONS-SCNC: 6 MMOL/L (ref 3–14)
AST SERPL W P-5'-P-CCNC: 13 U/L (ref 0–45)
BILIRUB SERPL-MCNC: 0.5 MG/DL (ref 0.2–1.3)
BUN SERPL-MCNC: 15 MG/DL (ref 7–30)
CALCIUM SERPL-MCNC: 9.4 MG/DL (ref 8.5–10.1)
CHLORIDE SERPL-SCNC: 104 MMOL/L (ref 94–109)
CHOLEST SERPL-MCNC: 267 MG/DL
CO2 SERPL-SCNC: 27 MMOL/L (ref 20–32)
CREAT SERPL-MCNC: 0.92 MG/DL (ref 0.52–1.04)
GFR SERPL CREATININE-BSD FRML MDRD: 67 ML/MIN/{1.73_M2}
GLUCOSE SERPL-MCNC: 94 MG/DL (ref 70–99)
HDLC SERPL-MCNC: 64 MG/DL
LDLC SERPL CALC-MCNC: 168 MG/DL
NONHDLC SERPL-MCNC: 203 MG/DL
POTASSIUM SERPL-SCNC: 4.6 MMOL/L (ref 3.4–5.3)
PROT SERPL-MCNC: 7.7 G/DL (ref 6.8–8.8)
SODIUM SERPL-SCNC: 137 MMOL/L (ref 133–144)
T4 FREE SERPL-MCNC: 1.06 NG/DL (ref 0.76–1.46)
TRIGL SERPL-MCNC: 175 MG/DL
TSH SERPL DL<=0.005 MIU/L-ACNC: 4.42 MU/L (ref 0.4–4)

## 2020-09-23 PROCEDURE — 80053 COMPREHEN METABOLIC PANEL: CPT | Performed by: FAMILY MEDICINE

## 2020-09-23 PROCEDURE — 84443 ASSAY THYROID STIM HORMONE: CPT | Performed by: FAMILY MEDICINE

## 2020-09-23 PROCEDURE — 80061 LIPID PANEL: CPT | Performed by: FAMILY MEDICINE

## 2020-09-23 PROCEDURE — 84439 ASSAY OF FREE THYROXINE: CPT | Performed by: FAMILY MEDICINE

## 2020-09-23 PROCEDURE — 36415 COLL VENOUS BLD VENIPUNCTURE: CPT | Performed by: FAMILY MEDICINE

## 2020-09-25 ENCOUNTER — TELEPHONE (OUTPATIENT)
Dept: FAMILY MEDICINE | Facility: OTHER | Age: 60
End: 2020-09-25

## 2020-09-25 ENCOUNTER — E-VISIT (OUTPATIENT)
Dept: FAMILY MEDICINE | Facility: OTHER | Age: 60
End: 2020-09-25
Payer: COMMERCIAL

## 2020-09-25 DIAGNOSIS — E78.5 HYPERLIPIDEMIA LDL GOAL <100: Primary | ICD-10-CM

## 2020-09-25 PROCEDURE — 99421 OL DIG E/M SVC 5-10 MIN: CPT | Performed by: FAMILY MEDICINE

## 2020-09-25 RX ORDER — ATORVASTATIN CALCIUM 40 MG/1
40 TABLET, FILM COATED ORAL DAILY
Qty: 90 TABLET | Refills: 3 | Status: SHIPPED | OUTPATIENT
Start: 2020-09-25 | End: 2020-10-27

## 2020-09-25 NOTE — TELEPHONE ENCOUNTER
----- Message from Era Bedolla MD sent at 9/25/2020  1:33 PM CDT -----  Mildly improved but still signicantly elevated cholesterol levels. She would benefit from taking anti cholesterol medication. Please advise appt  To discuss meds

## 2020-09-28 DIAGNOSIS — E03.9 HYPOTHYROIDISM, UNSPECIFIED TYPE: ICD-10-CM

## 2020-09-28 DIAGNOSIS — I10 ESSENTIAL HYPERTENSION WITH GOAL BLOOD PRESSURE LESS THAN 140/90: ICD-10-CM

## 2020-09-28 DIAGNOSIS — K21.9 GASTROESOPHAGEAL REFLUX DISEASE WITHOUT ESOPHAGITIS: ICD-10-CM

## 2020-09-28 RX ORDER — ATENOLOL 50 MG/1
TABLET ORAL
Qty: 90 TABLET | Refills: 3 | Status: SHIPPED | OUTPATIENT
Start: 2020-09-28 | End: 2020-10-27

## 2020-09-28 RX ORDER — PANTOPRAZOLE SODIUM 20 MG/1
20 TABLET, DELAYED RELEASE ORAL DAILY
Qty: 90 TABLET | Refills: 1 | Status: SHIPPED | OUTPATIENT
Start: 2020-09-28 | End: 2020-10-27

## 2020-09-28 RX ORDER — LEVOTHYROXINE SODIUM 137 UG/1
TABLET ORAL
Qty: 90 TABLET | Refills: 3 | Status: SHIPPED | OUTPATIENT
Start: 2020-09-28 | End: 2020-10-27

## 2020-09-28 RX ORDER — LISINOPRIL 20 MG/1
TABLET ORAL
Qty: 90 TABLET | Refills: 3 | Status: SHIPPED | OUTPATIENT
Start: 2020-09-28 | End: 2020-10-27

## 2020-10-26 ENCOUNTER — MYC MEDICAL ADVICE (OUTPATIENT)
Dept: FAMILY MEDICINE | Facility: OTHER | Age: 60
End: 2020-10-26

## 2020-10-26 DIAGNOSIS — E78.5 HYPERLIPIDEMIA LDL GOAL <100: ICD-10-CM

## 2020-10-26 DIAGNOSIS — I10 ESSENTIAL HYPERTENSION WITH GOAL BLOOD PRESSURE LESS THAN 140/90: ICD-10-CM

## 2020-10-26 DIAGNOSIS — K21.9 GASTROESOPHAGEAL REFLUX DISEASE WITHOUT ESOPHAGITIS: ICD-10-CM

## 2020-10-26 DIAGNOSIS — E03.9 HYPOTHYROIDISM, UNSPECIFIED TYPE: ICD-10-CM

## 2020-10-26 DIAGNOSIS — F33.0 MAJOR DEPRESSIVE DISORDER, RECURRENT EPISODE, MILD (H): ICD-10-CM

## 2020-10-27 RX ORDER — LISINOPRIL 20 MG/1
20 TABLET ORAL DAILY
Qty: 90 TABLET | Refills: 1 | Status: SHIPPED | OUTPATIENT
Start: 2020-10-27 | End: 2021-04-13

## 2020-10-27 RX ORDER — ATORVASTATIN CALCIUM 40 MG/1
40 TABLET, FILM COATED ORAL DAILY
Qty: 90 TABLET | Refills: 1 | Status: SHIPPED | OUTPATIENT
Start: 2020-10-27 | End: 2021-01-19

## 2020-10-27 RX ORDER — PANTOPRAZOLE SODIUM 20 MG/1
20 TABLET, DELAYED RELEASE ORAL DAILY
Qty: 90 TABLET | Refills: 1 | Status: SHIPPED | OUTPATIENT
Start: 2020-10-27 | End: 2021-04-13

## 2020-10-27 RX ORDER — LEVOTHYROXINE SODIUM 137 UG/1
TABLET ORAL
Qty: 90 TABLET | Refills: 1 | Status: SHIPPED | OUTPATIENT
Start: 2020-10-27 | End: 2021-04-13

## 2020-10-27 RX ORDER — SERTRALINE HYDROCHLORIDE 100 MG/1
TABLET, FILM COATED ORAL
Qty: 135 TABLET | Refills: 1 | Status: SHIPPED | OUTPATIENT
Start: 2020-10-27 | End: 2021-04-13

## 2020-10-27 RX ORDER — ATENOLOL 50 MG/1
50 TABLET ORAL DAILY
Qty: 90 TABLET | Refills: 1 | Status: SHIPPED | OUTPATIENT
Start: 2020-10-27 | End: 2021-04-13

## 2020-10-29 DIAGNOSIS — M22.41 CHONDROMALACIA OF RIGHT PATELLOFEMORAL JOINT: ICD-10-CM

## 2020-10-29 DIAGNOSIS — M22.42 CHONDROMALACIA OF LEFT PATELLOFEMORAL JOINT: ICD-10-CM

## 2020-10-30 RX ORDER — DICLOFENAC SODIUM 75 MG/1
75 TABLET, DELAYED RELEASE ORAL 2 TIMES DAILY PRN
Qty: 180 TABLET | Refills: 1 | Status: SHIPPED | OUTPATIENT
Start: 2020-10-30 | End: 2021-04-13

## 2020-10-30 NOTE — TELEPHONE ENCOUNTER
"Requested Prescriptions   Pending Prescriptions Disp Refills     diclofenac (VOLTAREN) 75 MG EC tablet 30 tablet 0     Sig: Take 1 tablet (75 mg) by mouth 2 times daily       NSAID Medications Failed - 10/29/2020  8:29 AM        Failed - Normal CBC on file in past 12 months     Recent Labs   Lab Test 07/30/18  1111   WBC 10.7   RBC 4.46   HGB 13.6   HCT 40.6                    Passed - Blood pressure under 140/90 in past 12 months     BP Readings from Last 3 Encounters:   06/01/20 134/82   12/05/19 120/86   05/15/19 124/80                 Passed - Normal ALT on file in past 12 months     Recent Labs   Lab Test 09/23/20  0938   ALT 23             Passed - Normal AST on file in past 12 months     Recent Labs   Lab Test 09/23/20  0938   AST 13             Passed - Recent (12 mo) or future (30 days) visit within the authorizing provider's specialty     Patient has had an office visit with the authorizing provider or a provider within the authorizing providers department within the previous 12 mos or has a future within next 30 days. See \"Patient Info\" tab in inbasket, or \"Choose Columns\" in Meds & Orders section of the refill encounter.              Passed - Patient is age 6-64 years        Passed - Medication is active on med list        Passed - No active pregnancy on record        Passed - Normal serum creatinine on file in past 12 months     Recent Labs   Lab Test 09/23/20  0938   CR 0.92       Ok to refill medication if creatinine is low          Passed - No positive pregnancy test in past 12 months           Routing refill request to provider for review/approval because:  Labs not current:  CBC    Hong Nevarez, RN, BSN          "

## 2020-11-06 PROBLEM — M25.561 CHRONIC PAIN OF BOTH KNEES: Status: RESOLVED | Noted: 2020-09-11 | Resolved: 2020-11-06

## 2020-11-06 PROBLEM — M25.562 CHRONIC PAIN OF BOTH KNEES: Status: RESOLVED | Noted: 2020-09-11 | Resolved: 2020-11-06

## 2020-11-06 PROBLEM — G89.29 CHRONIC PAIN OF BOTH KNEES: Status: RESOLVED | Noted: 2020-09-11 | Resolved: 2020-11-06

## 2020-11-06 NOTE — PROGRESS NOTES
Patient seen for one time evaluation and treatment.  Patient did not return for further treatment and current status is unknown.  Please see initial evaluation for further information.    Hermann Gutierrez,PT, DPT, OCS

## 2021-01-09 ENCOUNTER — HEALTH MAINTENANCE LETTER (OUTPATIENT)
Age: 61
End: 2021-01-09

## 2021-01-19 DIAGNOSIS — E78.5 HYPERLIPIDEMIA LDL GOAL <100: ICD-10-CM

## 2021-01-19 RX ORDER — ATORVASTATIN CALCIUM 40 MG/1
TABLET, FILM COATED ORAL
Qty: 90 TABLET | Refills: 0 | Status: SHIPPED | OUTPATIENT
Start: 2021-01-19 | End: 2021-04-19

## 2021-04-14 ENCOUNTER — MYC REFILL (OUTPATIENT)
Dept: FAMILY MEDICINE | Facility: OTHER | Age: 61
End: 2021-04-14

## 2021-04-14 DIAGNOSIS — K21.9 GASTROESOPHAGEAL REFLUX DISEASE WITHOUT ESOPHAGITIS: ICD-10-CM

## 2021-04-15 RX ORDER — PANTOPRAZOLE SODIUM 20 MG/1
20 TABLET, DELAYED RELEASE ORAL DAILY
Qty: 90 TABLET | Refills: 0 | OUTPATIENT
Start: 2021-04-15

## 2021-04-15 NOTE — TELEPHONE ENCOUNTER
Denied.   Should have refills.     Please call patient to schedule a medication check with provider. Patient is due for a visit. Thank you!  Mary Lou Quiñonez RN  April 15, 2021

## 2021-04-20 ENCOUNTER — TELEPHONE (OUTPATIENT)
Dept: FAMILY MEDICINE | Facility: OTHER | Age: 61
End: 2021-04-20

## 2021-04-20 NOTE — TELEPHONE ENCOUNTER
"\"The quantity is unclear on prescription dated 4/19/21, states 90 tablets but also states 1 month ? Please provide the quantity and fax back ,thanks \"   "

## 2021-04-22 NOTE — TELEPHONE ENCOUNTER
Spoke with [facility] pharmacy.  Will keep 90 day supply.  Due for follow up visit and fasting labs soon.  Lab orders are in if she would like lab only first then visit.    Please call and help schedule.    LARRY ParedesN, RN, PHN  [facility] ~ Lead Registered Nurse  Clinic Triage ~ [place] [facility] [place]  April 22, 2021

## 2021-05-15 DIAGNOSIS — M22.41 CHONDROMALACIA OF RIGHT PATELLOFEMORAL JOINT: ICD-10-CM

## 2021-05-15 DIAGNOSIS — M22.42 CHONDROMALACIA OF LEFT PATELLOFEMORAL JOINT: ICD-10-CM

## 2021-05-17 NOTE — TELEPHONE ENCOUNTER
Pending Prescriptions:                       Disp   Refills    diclofenac (VOLTAREN) 75 MG EC tablet [Pha*60 tab*0        Sig: TAKE 1 TABLET 2 TIMES DAILYAS NEEDED FOR MODERATE           PAIN    Routing refill request to provider for review/approval because:  Labs not current:  Normal CBC on file in past 12 months        Recent Labs   Lab Test 07/30/18  1111   WBC 10.7   RBC 4.46   HGB 13.6   HCT 40.6               over 13 months, RN unable to provide a shane refill.

## 2021-05-18 RX ORDER — DICLOFENAC SODIUM 75 MG/1
TABLET, DELAYED RELEASE ORAL
Qty: 60 TABLET | Refills: 0 | Status: SHIPPED | OUTPATIENT
Start: 2021-05-18 | End: 2021-06-08

## 2021-06-03 DIAGNOSIS — E78.5 HYPERLIPIDEMIA LDL GOAL <100: ICD-10-CM

## 2021-06-03 DIAGNOSIS — E03.9 HYPOTHYROIDISM, UNSPECIFIED TYPE: ICD-10-CM

## 2021-06-03 LAB
ALBUMIN SERPL-MCNC: 3.9 G/DL (ref 3.4–5)
ALP SERPL-CCNC: 132 U/L (ref 40–150)
ALT SERPL W P-5'-P-CCNC: 81 U/L (ref 0–50)
ANION GAP SERPL CALCULATED.3IONS-SCNC: 3 MMOL/L (ref 3–14)
AST SERPL W P-5'-P-CCNC: 39 U/L (ref 0–45)
BILIRUB SERPL-MCNC: 0.4 MG/DL (ref 0.2–1.3)
BUN SERPL-MCNC: 19 MG/DL (ref 7–30)
CALCIUM SERPL-MCNC: 8.7 MG/DL (ref 8.5–10.1)
CHLORIDE SERPL-SCNC: 111 MMOL/L (ref 94–109)
CHOLEST SERPL-MCNC: 154 MG/DL
CO2 SERPL-SCNC: 27 MMOL/L (ref 20–32)
CREAT SERPL-MCNC: 1.03 MG/DL (ref 0.52–1.04)
GFR SERPL CREATININE-BSD FRML MDRD: 58 ML/MIN/{1.73_M2}
GLUCOSE SERPL-MCNC: 97 MG/DL (ref 70–99)
HDLC SERPL-MCNC: 48 MG/DL
LDLC SERPL CALC-MCNC: 81 MG/DL
NONHDLC SERPL-MCNC: 106 MG/DL
POTASSIUM SERPL-SCNC: 4.5 MMOL/L (ref 3.4–5.3)
PROT SERPL-MCNC: 7.4 G/DL (ref 6.8–8.8)
SODIUM SERPL-SCNC: 141 MMOL/L (ref 133–144)
TRIGL SERPL-MCNC: 123 MG/DL
TSH SERPL DL<=0.005 MIU/L-ACNC: 2.86 MU/L (ref 0.4–4)

## 2021-06-03 PROCEDURE — 80053 COMPREHEN METABOLIC PANEL: CPT | Performed by: FAMILY MEDICINE

## 2021-06-03 PROCEDURE — 84443 ASSAY THYROID STIM HORMONE: CPT | Performed by: FAMILY MEDICINE

## 2021-06-03 PROCEDURE — 80061 LIPID PANEL: CPT | Performed by: FAMILY MEDICINE

## 2021-06-03 PROCEDURE — 36415 COLL VENOUS BLD VENIPUNCTURE: CPT | Performed by: FAMILY MEDICINE

## 2021-06-07 NOTE — PROGRESS NOTES
Assessment & Plan     Essential hypertension with goal blood pressure less than 140/90  Well-controlled.  She would like to stop her atenolol.  I feel this is reasonable.  She will follow her blood pressures.  If they elevate and are consistently above 140 she will send me a [employer] message and we will increase her lisinopril at that time.    - lisinopril (ZESTRIL) 20 MG tablet; Take 1 tablet (20 mg) by mouth daily    Hyperlipidemia LDL goal <100  Remains on statin.  Refills are given.    - atorvastatin (LIPITOR) 40 MG tablet; Take 1 tablet (40 mg) by mouth daily    Hypothyroidism, unspecified type  Last TSH reviewed and was normal.  Refills are given.    - levothyroxine (SYNTHROID) 137 MCG tablet; Take 1 tablet (137 mcg) by mouth daily    Major depressive disorder, recurrent episode, mild (H)  Her mood is good.  She states that during the year she may decrease her Zoloft to 1 tablet a day but if her symptoms worsen she will then increase to 1-1/2 tablets daily.  She does not want to consider dose decrease at this time.    - sertraline (ZOLOFT) 100 MG tablet; TAKE 1 AND 1/2 TABLETS     DAILY    BMI >40  She is swimming.  She is hoping that stopping the beta-blocker will help with weight loss    Chondromalacia of patellofemoral joint, unspecified laterality  She follows with orthopedics and with therapy and strength training.  She uses diclofenac sparingly.    - diclofenac (VOLTAREN) 75 MG EC tablet; TAKE 1 TABLET 2 TIMES DAILYAS NEEDED FOR MODERATE PAIN    Gastroesophageal reflux disease without esophagitis  Protonix is helpful for heartburn.  She states she uses this sparingly.    - pantoprazole (PROTONIX) 20 MG EC tablet; Take 1 tablet (20 mg) by mouth daily as needed for heartburn    Visit for screening mammogram  She plans to schedule this while she is back in [place].    - MA SCREENING DIGITAL BILAT - Future  (s+30); Future     BMI:   Estimated body mass index is 42.2 kg/m  as calculated from the  "following:    Height as of this encounter: 1.613 m (5' 3.5\").    Weight as of this encounter: 109.8 kg (242 lb).   Weight management plan: Discussed healthy diet and exercise guidelines    Return in about 1 year (around 6/8/2022) for Routine Visit.    Miranda Bosch MD  [facility] [place] [facility] JACQUELINE Sweet is a 60 year old who presents for the following health issues     History of Present Illness       Mental Health Follow-up:  Patient presents to follow-up on Depression & Anxiety.Patient's depression since last visit has been:  Better  The patient is not having other symptoms associated with depression.  Patient's anxiety since last visit has been:  Better  The patient is not having other symptoms associated with anxiety.  Any significant life events: No  Patient is not feeling anxious or having panic attacks.  Patient has no concerns about alcohol or drug use.     Social History  Tobacco Use    Smoking status: Never Smoker    Smokeless tobacco: Never Used    Tobacco comment: No smokers in home  Alcohol use: Yes    Comment: 1-2 glasses of wine/week  Drug use: No      Today's PHQ-9         PHQ-9 Total Score:         PHQ-9 Q9 Thoughts of better off dead/self-harm past 2 weeks :       Thoughts of suicide or self harm:      Self-harm Plan:        Self-harm Action:          Safety concerns for self or others:           Hyperlipidemia:  She presents for follow up of hyperlipidemia.  She is taking medication to lower cholesterol. She is not having myalgia or other side effects to statin medications.    Hypertension: She presents for follow up of hypertension.  She does check blood pressure  regularly outside of the clinic. Outpatient blood pressures have not been over 140/90. She follows a low salt diet.     Hypothyroidism:     Symptoms since last visit:: \"the normal\"    She eats 4 or more servings of fruits and vegetables daily.She consumes 0 sweetened beverage(s) daily.She exercises with " "enough effort to increase her heart rate 30 to 60 minutes per day.  She exercises with enough effort to increase her heart rate 4 days per week.   She is taking medications regularly.     Checked BP at [facility], BP was always good, but was turned away from donating blood because heart rate in the 40s.  She states she is an avid swimmer and can only get her heart rate up into the 70s and she is concerned about not being able to lose weight because of this.    Patient is now retired.  She has sold her house and lives in an .  She is considered a full-time resident in [place].  She has family in both [place] and [place].        Objective    /84   Pulse 52   Temp 97.6  F (36.4  C) (Temporal)   Resp 12   Ht 1.613 m (5' 3.5\")   Wt 109.8 kg (242 lb)   LMP 07/29/2003   SpO2 95%   BMI 42.20 kg/m    Body mass index is 42.2 kg/m .  Physical Exam   Gen: no apparent distress  Chest: clear to auscultation without wheeze, rale or rhonchi  Cor: regular rate and rhythm without murmur  Ext: warm and dry without edema  Psych: Alert and oriented times 3; coherent speech, normal   rate and volume, able to articulate logical thoughts, able   to abstract reason, no tangential thoughts, no hallucinations   or delusions  Her affect is bright        "

## 2021-06-08 ENCOUNTER — OFFICE VISIT (OUTPATIENT)
Dept: FAMILY MEDICINE | Facility: OTHER | Age: 61
End: 2021-06-08
Payer: COMMERCIAL

## 2021-06-08 VITALS
TEMPERATURE: 97.6 F | HEART RATE: 52 BPM | OXYGEN SATURATION: 95 % | DIASTOLIC BLOOD PRESSURE: 84 MMHG | HEIGHT: 64 IN | BODY MASS INDEX: 41.32 KG/M2 | WEIGHT: 242 LBS | RESPIRATION RATE: 12 BRPM | SYSTOLIC BLOOD PRESSURE: 128 MMHG

## 2021-06-08 DIAGNOSIS — E78.5 HYPERLIPIDEMIA LDL GOAL <100: ICD-10-CM

## 2021-06-08 DIAGNOSIS — Z12.31 VISIT FOR SCREENING MAMMOGRAM: ICD-10-CM

## 2021-06-08 DIAGNOSIS — E03.9 HYPOTHYROIDISM, UNSPECIFIED TYPE: ICD-10-CM

## 2021-06-08 DIAGNOSIS — F33.0 MAJOR DEPRESSIVE DISORDER, RECURRENT EPISODE, MILD (H): ICD-10-CM

## 2021-06-08 DIAGNOSIS — K21.9 GASTROESOPHAGEAL REFLUX DISEASE WITHOUT ESOPHAGITIS: ICD-10-CM

## 2021-06-08 DIAGNOSIS — I10 ESSENTIAL HYPERTENSION WITH GOAL BLOOD PRESSURE LESS THAN 140/90: Primary | ICD-10-CM

## 2021-06-08 DIAGNOSIS — E66.01 MORBID OBESITY, UNSPECIFIED OBESITY TYPE (H): ICD-10-CM

## 2021-06-08 DIAGNOSIS — M22.40 CHONDROMALACIA OF PATELLOFEMORAL JOINT, UNSPECIFIED LATERALITY: ICD-10-CM

## 2021-06-08 PROBLEM — R53.83 FATIGUE, UNSPECIFIED TYPE: Status: RESOLVED | Noted: 2018-08-01 | Resolved: 2021-06-08

## 2021-06-08 PROCEDURE — 90472 IMMUNIZATION ADMIN EACH ADD: CPT | Performed by: FAMILY MEDICINE

## 2021-06-08 PROCEDURE — 99214 OFFICE O/P EST MOD 30 MIN: CPT | Mod: 25 | Performed by: FAMILY MEDICINE

## 2021-06-08 PROCEDURE — 90750 HZV VACC RECOMBINANT IM: CPT | Performed by: FAMILY MEDICINE

## 2021-06-08 PROCEDURE — 90471 IMMUNIZATION ADMIN: CPT | Performed by: FAMILY MEDICINE

## 2021-06-08 PROCEDURE — 90715 TDAP VACCINE 7 YRS/> IM: CPT | Performed by: FAMILY MEDICINE

## 2021-06-08 RX ORDER — LISINOPRIL 20 MG/1
20 TABLET ORAL DAILY
Qty: 90 TABLET | Refills: 3 | Status: SHIPPED | OUTPATIENT
Start: 2021-06-08 | End: 2022-05-24

## 2021-06-08 RX ORDER — DICLOFENAC SODIUM 75 MG/1
TABLET, DELAYED RELEASE ORAL
Qty: 180 TABLET | Refills: 3 | Status: SHIPPED | OUTPATIENT
Start: 2021-06-08 | End: 2022-05-24

## 2021-06-08 RX ORDER — LEVOTHYROXINE SODIUM 137 UG/1
137 TABLET ORAL DAILY
Qty: 90 TABLET | Refills: 3 | Status: SHIPPED | OUTPATIENT
Start: 2021-06-08 | End: 2022-05-24

## 2021-06-08 RX ORDER — ATORVASTATIN CALCIUM 40 MG/1
40 TABLET, FILM COATED ORAL DAILY
Qty: 90 TABLET | Refills: 3 | Status: SHIPPED | OUTPATIENT
Start: 2021-06-08 | End: 2022-05-24

## 2021-06-08 RX ORDER — PANTOPRAZOLE SODIUM 20 MG/1
20 TABLET, DELAYED RELEASE ORAL DAILY PRN
Qty: 90 TABLET | Refills: 3 | Status: SHIPPED | OUTPATIENT
Start: 2021-06-08 | End: 2022-05-24

## 2021-06-08 RX ORDER — SERTRALINE HYDROCHLORIDE 100 MG/1
TABLET, FILM COATED ORAL
Qty: 135 TABLET | Refills: 3 | Status: SHIPPED | OUTPATIENT
Start: 2021-06-08 | End: 2021-06-21

## 2021-06-08 RX ORDER — ATENOLOL 50 MG/1
50 TABLET ORAL DAILY
Qty: 90 TABLET | Refills: 0 | Status: CANCELLED | OUTPATIENT
Start: 2021-06-08

## 2021-06-08 ASSESSMENT — ANXIETY QUESTIONNAIRES
6. BECOMING EASILY ANNOYED OR IRRITABLE: NOT AT ALL
7. FEELING AFRAID AS IF SOMETHING AWFUL MIGHT HAPPEN: NOT AT ALL
3. WORRYING TOO MUCH ABOUT DIFFERENT THINGS: NOT AT ALL
1. FEELING NERVOUS, ANXIOUS, OR ON EDGE: NOT AT ALL
GAD7 TOTAL SCORE: 0
2. NOT BEING ABLE TO STOP OR CONTROL WORRYING: NOT AT ALL
5. BEING SO RESTLESS THAT IT IS HARD TO SIT STILL: NOT AT ALL

## 2021-06-08 ASSESSMENT — PATIENT HEALTH QUESTIONNAIRE - PHQ9
SUM OF ALL RESPONSES TO PHQ QUESTIONS 1-9: 1
5. POOR APPETITE OR OVEREATING: NOT AT ALL

## 2021-06-08 ASSESSMENT — MIFFLIN-ST. JEOR: SCORE: 1639.76

## 2021-06-08 NOTE — PROGRESS NOTES
Prior to immunization administration, verified patients identity using patient s name and date of birth. Please see Immunization Activity for additional information.     Screening Questionnaire for Adult Immunization    Are you sick today?   No   Do you have allergies to medications, food, a vaccine component or latex?   No   Have you ever had a serious reaction after receiving a vaccination?   No   Do you have a long-term health problem with heart, lung, kidney, or metabolic disease (e.g., diabetes), asthma, a blood disorder, no spleen, complement component deficiency, a cochlear implant, or a spinal fluid leak?  Are you on long-term aspirin therapy?   No   Do you have cancer, leukemia, HIV/AIDS, or any other immune system problem?   No   Do you have a parent, brother, or sister with an immune system problem?   No   In the past 3 months, have you taken medications that affect  your immune system, such as prednisone, other steroids, or anticancer drugs; drugs for the treatment of rheumatoid arthritis, Crohn s disease, or psoriasis; or have you had radiation treatments?   No   Have you had a seizure, or a brain or other nervous system problem?   No   During the past year, have you received a transfusion of blood or blood    products, or been given immune (gamma) globulin or antiviral drug?   No   For women: Are you pregnant or is there a chance you could become       pregnant during the next month?   No   Have you received any vaccinations in the past 4 weeks?   No     Immunization questionnaire answers were all negative.        Per orders of Dr. Miranda Bosch, injection of TDAP and Shingrix given by Joann Covarrubias CMA. Patient instructed to remain in clinic for 15 minutes afterwards, and to report any adverse reaction to me immediately.       Screening performed by Joann Covarrubias CMA on 6/8/2021 at 9:27 AM.

## 2021-06-09 ASSESSMENT — ANXIETY QUESTIONNAIRES: GAD7 TOTAL SCORE: 0

## 2021-06-19 DIAGNOSIS — F33.0 MAJOR DEPRESSIVE DISORDER, RECURRENT EPISODE, MILD (H): ICD-10-CM

## 2021-06-21 RX ORDER — SERTRALINE HYDROCHLORIDE 100 MG/1
TABLET, FILM COATED ORAL
Qty: 135 TABLET | Refills: 3 | Status: SHIPPED | OUTPATIENT
Start: 2021-06-21 | End: 2022-05-24

## 2021-08-10 ENCOUNTER — TELEPHONE (OUTPATIENT)
Dept: FAMILY MEDICINE | Facility: OTHER | Age: 61
End: 2021-08-10

## 2021-08-10 NOTE — TELEPHONE ENCOUNTER
Patient Quality Outreach Summary      Summary:    Patient is due/failing the following:   Breast Cancer Screening - Mammogram and Physical     Type of outreach:    Sent [employer] message.    Questions for provider review:    None                                                                                                                    Jaquelin Alaniz CMA       Chart routed to Care Team.

## 2021-10-05 ENCOUNTER — TELEPHONE (OUTPATIENT)
Dept: FAMILY MEDICINE | Facility: OTHER | Age: 61
End: 2021-10-05

## 2021-10-05 NOTE — TELEPHONE ENCOUNTER
Patient Quality Outreach Summary      Summary:    Patient is due/failing the following:   Cervical Cancer Screening - PAP Needed    Type of outreach:    abstracted     Questions for provider review:    None                                                                                                                    Jaquelin Alaniz CMA       Chart routed to Care Team.

## 2021-10-18 ENCOUNTER — TELEPHONE (OUTPATIENT)
Dept: FAMILY MEDICINE | Facility: OTHER | Age: 61
End: 2021-10-18

## 2021-10-18 NOTE — TELEPHONE ENCOUNTER
Patient Quality Outreach Summary      Summary:    Patient is due/failing the following:   Breast Cancer Screening - Mammogram    Type of outreach:    Sent [employer] message.    Questions for provider review:    None                                                                                                                    Jaquelin Alaniz CMA       Chart routed to Care Team.

## 2021-10-23 ENCOUNTER — HEALTH MAINTENANCE LETTER (OUTPATIENT)
Age: 61
End: 2021-10-23

## 2021-10-28 NOTE — TELEPHONE ENCOUNTER
Patient Quality Outreach Summary      Summary:    Patient is due/failing the following:   Breast Cancer Screening - Mammogram    Type of outreach:    Phone, left message for patient/parent to call back.    Questions for provider review:    None                                                                                                                    Jaquelin Alaniz CMA       Chart routed to Care Team.

## 2021-11-08 DIAGNOSIS — E03.9 HYPOTHYROIDISM, UNSPECIFIED TYPE: ICD-10-CM

## 2021-11-08 DIAGNOSIS — F33.0 MAJOR DEPRESSIVE DISORDER, RECURRENT EPISODE, MILD (H): ICD-10-CM

## 2021-11-09 RX ORDER — SERTRALINE HYDROCHLORIDE 100 MG/1
TABLET, FILM COATED ORAL
Qty: 45 TABLET | Refills: 0 | OUTPATIENT
Start: 2021-11-09

## 2021-11-09 RX ORDER — LEVOTHYROXINE SODIUM 137 MCG
TABLET ORAL
Qty: 30 TABLET | Refills: 0 | OUTPATIENT
Start: 2021-11-09

## 2021-11-09 NOTE — TELEPHONE ENCOUNTER
Prescription was sent 6/2021 for #3 months with 3 refills.  Pharmacy notified via E-Prescribe refusal.     Enma De Paz RN on 11/9/2021 at 5:26 PM

## 2022-02-12 ENCOUNTER — HEALTH MAINTENANCE LETTER (OUTPATIENT)
Age: 62
End: 2022-02-12

## 2022-05-17 ENCOUNTER — ANCILLARY PROCEDURE (OUTPATIENT)
Dept: MAMMOGRAPHY | Facility: OTHER | Age: 62
End: 2022-05-17
Attending: FAMILY MEDICINE
Payer: COMMERCIAL

## 2022-05-17 DIAGNOSIS — Z12.31 VISIT FOR SCREENING MAMMOGRAM: ICD-10-CM

## 2022-05-17 PROCEDURE — 77063 BREAST TOMOSYNTHESIS BI: CPT | Mod: TC | Performed by: RADIOLOGY

## 2022-05-17 PROCEDURE — 77067 SCR MAMMO BI INCL CAD: CPT | Mod: TC | Performed by: RADIOLOGY

## 2022-05-24 ENCOUNTER — OFFICE VISIT (OUTPATIENT)
Dept: FAMILY MEDICINE | Facility: OTHER | Age: 62
End: 2022-05-24
Payer: COMMERCIAL

## 2022-05-24 VITALS
HEIGHT: 64 IN | DIASTOLIC BLOOD PRESSURE: 66 MMHG | BODY MASS INDEX: 39.78 KG/M2 | OXYGEN SATURATION: 99 % | TEMPERATURE: 97 F | RESPIRATION RATE: 16 BRPM | HEART RATE: 57 BPM | SYSTOLIC BLOOD PRESSURE: 118 MMHG | WEIGHT: 233 LBS

## 2022-05-24 DIAGNOSIS — Z00.00 ROUTINE GENERAL MEDICAL EXAMINATION AT A HEALTH CARE FACILITY: Primary | ICD-10-CM

## 2022-05-24 DIAGNOSIS — I10 ESSENTIAL HYPERTENSION WITH GOAL BLOOD PRESSURE LESS THAN 140/90: ICD-10-CM

## 2022-05-24 DIAGNOSIS — K21.9 GASTROESOPHAGEAL REFLUX DISEASE WITHOUT ESOPHAGITIS: ICD-10-CM

## 2022-05-24 DIAGNOSIS — M22.40 CHONDROMALACIA OF PATELLOFEMORAL JOINT, UNSPECIFIED LATERALITY: ICD-10-CM

## 2022-05-24 DIAGNOSIS — F33.0 MAJOR DEPRESSIVE DISORDER, RECURRENT EPISODE, MILD (H): ICD-10-CM

## 2022-05-24 DIAGNOSIS — E78.5 HYPERLIPIDEMIA, UNSPECIFIED HYPERLIPIDEMIA TYPE: ICD-10-CM

## 2022-05-24 DIAGNOSIS — E03.9 HYPOTHYROIDISM, UNSPECIFIED TYPE: ICD-10-CM

## 2022-05-24 LAB
ALBUMIN SERPL-MCNC: 3.8 G/DL (ref 3.4–5)
ALP SERPL-CCNC: 106 U/L (ref 40–150)
ALT SERPL W P-5'-P-CCNC: 22 U/L (ref 0–50)
ANION GAP SERPL CALCULATED.3IONS-SCNC: 5 MMOL/L (ref 3–14)
AST SERPL W P-5'-P-CCNC: 15 U/L (ref 0–45)
BILIRUB SERPL-MCNC: 0.4 MG/DL (ref 0.2–1.3)
BUN SERPL-MCNC: 19 MG/DL (ref 7–30)
CALCIUM SERPL-MCNC: 9.2 MG/DL (ref 8.5–10.1)
CHLORIDE BLD-SCNC: 111 MMOL/L (ref 94–109)
CHOLEST SERPL-MCNC: 142 MG/DL
CO2 SERPL-SCNC: 25 MMOL/L (ref 20–32)
CREAT SERPL-MCNC: 1 MG/DL (ref 0.52–1.04)
FASTING STATUS PATIENT QL REPORTED: YES
GFR SERPL CREATININE-BSD FRML MDRD: 63 ML/MIN/1.73M2
GLUCOSE BLD-MCNC: 91 MG/DL (ref 70–99)
HDLC SERPL-MCNC: 51 MG/DL
LDLC SERPL CALC-MCNC: 57 MG/DL
NONHDLC SERPL-MCNC: 91 MG/DL
POTASSIUM BLD-SCNC: 4.8 MMOL/L (ref 3.4–5.3)
PROT SERPL-MCNC: 7.1 G/DL (ref 6.8–8.8)
SODIUM SERPL-SCNC: 141 MMOL/L (ref 133–144)
TRIGL SERPL-MCNC: 170 MG/DL
TSH SERPL DL<=0.005 MIU/L-ACNC: 2.69 MU/L (ref 0.4–4)

## 2022-05-24 PROCEDURE — 80061 LIPID PANEL: CPT | Performed by: FAMILY MEDICINE

## 2022-05-24 PROCEDURE — 80053 COMPREHEN METABOLIC PANEL: CPT | Performed by: FAMILY MEDICINE

## 2022-05-24 PROCEDURE — 99214 OFFICE O/P EST MOD 30 MIN: CPT | Mod: 25 | Performed by: FAMILY MEDICINE

## 2022-05-24 PROCEDURE — 36415 COLL VENOUS BLD VENIPUNCTURE: CPT | Performed by: FAMILY MEDICINE

## 2022-05-24 PROCEDURE — 90471 IMMUNIZATION ADMIN: CPT | Performed by: FAMILY MEDICINE

## 2022-05-24 PROCEDURE — 99396 PREV VISIT EST AGE 40-64: CPT | Mod: 25 | Performed by: FAMILY MEDICINE

## 2022-05-24 PROCEDURE — 84443 ASSAY THYROID STIM HORMONE: CPT | Performed by: FAMILY MEDICINE

## 2022-05-24 PROCEDURE — 90750 HZV VACC RECOMBINANT IM: CPT | Performed by: FAMILY MEDICINE

## 2022-05-24 RX ORDER — LISINOPRIL 20 MG/1
20 TABLET ORAL DAILY
Qty: 90 TABLET | Refills: 3 | Status: SHIPPED | OUTPATIENT
Start: 2022-05-24 | End: 2023-05-01

## 2022-05-24 RX ORDER — LEVOTHYROXINE SODIUM 137 UG/1
137 TABLET ORAL DAILY
Qty: 90 TABLET | Refills: 3 | Status: SHIPPED | OUTPATIENT
Start: 2022-05-24 | End: 2023-05-01

## 2022-05-24 RX ORDER — ATORVASTATIN CALCIUM 40 MG/1
40 TABLET, FILM COATED ORAL DAILY
Qty: 90 TABLET | Refills: 3 | Status: SHIPPED | OUTPATIENT
Start: 2022-05-24 | End: 2023-05-01

## 2022-05-24 RX ORDER — DICLOFENAC SODIUM 75 MG/1
TABLET, DELAYED RELEASE ORAL
Qty: 180 TABLET | Refills: 3 | Status: SHIPPED | OUTPATIENT
Start: 2022-05-24 | End: 2023-06-13

## 2022-05-24 RX ORDER — SERTRALINE HYDROCHLORIDE 100 MG/1
TABLET, FILM COATED ORAL
Qty: 135 TABLET | Refills: 3 | Status: SHIPPED | OUTPATIENT
Start: 2022-05-24 | End: 2023-05-01

## 2022-05-24 RX ORDER — PANTOPRAZOLE SODIUM 20 MG/1
20 TABLET, DELAYED RELEASE ORAL DAILY PRN
Qty: 90 TABLET | Refills: 3 | Status: SHIPPED | OUTPATIENT
Start: 2022-05-24 | End: 2023-06-13

## 2022-05-24 ASSESSMENT — ENCOUNTER SYMPTOMS
JOINT SWELLING: 1
ABDOMINAL PAIN: 0
MYALGIAS: 0
NAUSEA: 0
CONSTIPATION: 0
EYE PAIN: 0
FREQUENCY: 0
PALPITATIONS: 0
DYSURIA: 0
ARTHRALGIAS: 1
HEMATOCHEZIA: 0
SHORTNESS OF BREATH: 0
PARESTHESIAS: 0
NERVOUS/ANXIOUS: 0
HEARTBURN: 0
BREAST MASS: 0
COUGH: 0
SORE THROAT: 0
HEMATURIA: 0
FEVER: 0
DIZZINESS: 0
WEAKNESS: 0
CHILLS: 0
DIARRHEA: 0
HEADACHES: 0

## 2022-05-24 ASSESSMENT — PATIENT HEALTH QUESTIONNAIRE - PHQ9
SUM OF ALL RESPONSES TO PHQ QUESTIONS 1-9: 4
SUM OF ALL RESPONSES TO PHQ QUESTIONS 1-9: 4
10. IF YOU CHECKED OFF ANY PROBLEMS, HOW DIFFICULT HAVE THESE PROBLEMS MADE IT FOR YOU TO DO YOUR WORK, TAKE CARE OF THINGS AT HOME, OR GET ALONG WITH OTHER PEOPLE: NOT DIFFICULT AT ALL

## 2022-05-24 ASSESSMENT — PAIN SCALES - GENERAL: PAINLEVEL: NO PAIN (0)

## 2022-05-24 NOTE — PROGRESS NOTES
SUBJECTIVE:   CC: Micki Santos is an 61 year old woman who presents for preventive health visit.       Healthy Habits:     Getting at least 3 servings of Calcium per day:  Yes    Bi-annual eye exam:  Yes    Dental care twice a year:  Yes    Sleep apnea or symptoms of sleep apnea:  Sleep apnea    Diet:  Low fat/cholesterol    Frequency of exercise:  4-5 days/week    Duration of exercise:  45-60 minutes    Taking medications regularly:  Yes    Medication side effects:  None    PHQ-2 Total Score: 1    Additional concerns today:  No      Today's PHQ-2 Score:   PHQ-2 ( 1999 [employer]) 5/24/2022   Q1: Little interest or pleasure in doing things 0   Q2: Feeling down, depressed or hopeless 1   PHQ-2 Score 1   PHQ-2 Total Score (12-17 Years)- Positive if 3 or more points; Administer PHQ-A if positive -   Q1: Little interest or pleasure in doing things Not at all   Q2: Feeling down, depressed or hopeless Several days   PHQ-2 Score 1       Abuse: Current or Past (Physical, Sexual or Emotional) - No  Do you feel safe in your environment? Yes    Have you ever done Advance Care Planning? (For example, a Health Directive, POLST, or a discussion with a medical provider or your loved ones about your wishes): Yes, patient states has an Advance Care Planning document and will bring a copy to the clinic.    Social History     Tobacco Use     Smoking status: Never Smoker     Smokeless tobacco: Never Used     Tobacco comment: No smokers in home   Substance Use Topics     Alcohol use: Yes     Comment: 1-2 glasses of wine/week         Alcohol Use 5/24/2022   Prescreen: >3 drinks/day or >7 drinks/week? Not Applicable       Reviewed orders with patient.  Reviewed health maintenance and updated orders accordingly - Yes    Breast Cancer Screening:    FHS-7:   Breast CA Risk Assessment (FHS-7) 5/17/2022 5/24/2022   Did any of your first-degree relatives have breast or ovarian cancer? No No   Did any of your relatives have bilateral  breast cancer? No No   Did any man in your family have breast cancer? No -   Did any woman in your family have breast and ovarian cancer? No -   Did any woman in your family have breast cancer before age 50 y? No -   Do you have 2 or more relatives with breast and/or ovarian cancer? No -   Do you have 2 or more relatives with breast and/or bowel cancer? No -       Mammogram Screening: Recommended mammography every 1-2 years with patient discussion and risk factor consideration  Pertinent mammograms are reviewed under the imaging tab.    History of abnormal Pap smear: NO - age 30-65 PAP every 5 years with negative HPV co-testing recommended     Reviewed and updated as needed this visit by clinical staff   Tobacco  Allergies  Meds  Problems  Med Hx  Surg Hx  Fam Hx  Soc   Hx          Reviewed and updated as needed this visit by Provider   Tobacco  Allergies  Meds  Problems  Med Hx  Surg Hx  Fam Hx               Review of Systems   Constitutional: Negative for chills and fever.   HENT: Negative for congestion, ear pain, hearing loss and sore throat.    Eyes: Negative for pain and visual disturbance.   Respiratory: Negative for cough and shortness of breath.    Cardiovascular: Negative for chest pain, palpitations and peripheral edema.   Gastrointestinal: Negative for abdominal pain, constipation, diarrhea, heartburn, hematochezia and nausea.   Breasts:  Negative for tenderness, breast mass and discharge.   Genitourinary: Negative for dysuria, frequency, genital sores, hematuria, pelvic pain, urgency, vaginal bleeding and vaginal discharge.   Musculoskeletal: Positive for arthralgias and joint swelling. Negative for myalgias.   Skin: Negative for rash.   Neurological: Negative for dizziness, weakness, headaches and paresthesias.   Psychiatric/Behavioral: Negative for mood changes. The patient is not nervous/anxious.         OBJECTIVE:   /66   Pulse 57   Temp 97  F (36.1  C) (Temporal)   Resp 16    "Ht 1.62 m (5' 3.78\")   Wt 105.7 kg (233 lb)   LMP 07/29/2003   SpO2 99%   BMI 40.27 kg/m    Physical Exam  Constitutional:       General: She is not in acute distress.     Appearance: She is well-developed.   HENT:      Right Ear: Tympanic membrane and external ear normal.      Left Ear: Tympanic membrane and external ear normal.      Nose: Nose normal.   Eyes:      General:         Right eye: No discharge.         Left eye: No discharge.      Conjunctiva/sclera: Conjunctivae normal.      Pupils: Pupils are equal, round, and reactive to light.   Neck:      Thyroid: No thyromegaly.      Trachea: No tracheal deviation.   Cardiovascular:      Rate and Rhythm: Normal rate and regular rhythm.      Heart sounds: Normal heart sounds, S1 normal and S2 normal. No murmur heard.  Pulmonary:      Effort: Pulmonary effort is normal. No respiratory distress.      Breath sounds: Normal breath sounds. No wheezing or rales.   Abdominal:      General: Bowel sounds are normal.      Palpations: Abdomen is soft. There is no mass.      Tenderness: There is no abdominal tenderness.   Musculoskeletal:         General: Normal range of motion.      Cervical back: Neck supple.   Lymphadenopathy:      Cervical: No cervical adenopathy.   Skin:     General: Skin is warm and dry.      Findings: No rash.   Neurological:      Mental Status: She is alert and oriented to person, place, and time.      Deep Tendon Reflexes: Reflexes are normal and symmetric.   Psychiatric:         Thought Content: Thought content normal.         Judgment: Judgment normal.           ASSESSMENT/PLAN:   (Z00.00) Routine general medical examination at a health care facility  (primary encounter diagnosis)    (E78.5) Hyperlipidemia, unspecified hyperlipidemia type  Comment: Continues on statin.  Labs drawn.  Refills given.  Plan: COMPREHENSIVE METABOLIC PANEL, Lipid panel         reflex to direct LDL Fasting, atorvastatin         (LIPITOR) 40 MG tablet          (M22.40) " "Chondromalacia of patellofemoral joint, unspecified laterality  Comment: Uses diclofenac as needed.  Refills given.  Plan: diclofenac (VOLTAREN) 75 MG EC tablet          (E03.9) Hypothyroidism, unspecified type  Comment: Stable on replacement.  Labs drawn.  Refills given.  Plan: TSH WITH FREE T4 REFLEX, levothyroxine         (SYNTHROID) 137 MCG tablet          (I10) Essential hypertension with goal blood pressure less than 140/90  Comment: Well-controlled on only lisinopril.  She is no longer on atenolol.  Labs drawn.  Refills given.  Plan: COMPREHENSIVE METABOLIC PANEL, Lipid panel         reflex to direct LDL Fasting, lisinopril         (ZESTRIL) 20 MG tablet          (K21.9) Gastroesophageal reflux disease without esophagitis  Comment: Well-controlled on Protonix.  Refills given.  Plan: pantoprazole (PROTONIX) 20 MG EC tablet          (F33.0) Major depressive disorder, recurrent episode, mild (H)  Comment: She has had a lot of stress in the last year.  6 months ago her mother .  This past week her father  after having a series of amputations secondary to diabetes.  Her  had a quadruple bypass a couple of months ago.  Her  is doing well.  They are traveling in an  and plan on summering in [place].  She feels the Zoloft is keeping things stable.  Refills were given.  Plan: sertraline (ZOLOFT) 100 MG tablet            COUNSELING:  Reviewed preventive health counseling, as reflected in patient instructions    Estimated body mass index is 40.27 kg/m  as calculated from the following:    Height as of this encounter: 1.62 m (5' 3.78\").    Weight as of this encounter: 105.7 kg (233 lb).    Weight management plan: Discussed healthy diet and exercise guidelines    She reports that she has never smoked. She has never used smokeless tobacco.      Counseling Resources:  ATP IV Guidelines  Pooled Cohorts Equation Calculator  Breast Cancer Risk Calculator  BRCA-Related Cancer Risk Assessment: FHS-7 " Tool  FRAX Risk Assessment  ICSI Preventive Guidelines  Dietary Guidelines for Americans, 2010  USDA's MyPlate  ASA Prophylaxis  Lung CA Screening    Miranda Bosch MD  [facility]  Answers for HPI/ROS submitted by the patient on 5/24/2022  If you checked off any problems, how difficult have these problems made it for you to do your work, take care of things at home, or get along with other people?: Not difficult at all  PHQ9 TOTAL SCORE: 4

## 2022-05-24 NOTE — PROGRESS NOTES
Prior to immunization administration, verified patients identity using patient s name and date of birth. Please see Immunization Activity for additional information.     Screening Questionnaire for Adult Immunization    Are you sick today?   No   Do you have allergies to medications, food, a vaccine component or latex?   No   Have you ever had a serious reaction after receiving a vaccination?   No   Do you have a long-term health problem with heart, lung, kidney, or metabolic disease (e.g., diabetes), asthma, a blood disorder, no spleen, complement component deficiency, a cochlear implant, or a spinal fluid leak?  Are you on long-term aspirin therapy?   No   Do you have cancer, leukemia, HIV/AIDS, or any other immune system problem?   No   Do you have a parent, brother, or sister with an immune system problem?   No   In the past 3 months, have you taken medications that affect  your immune system, such as prednisone, other steroids, or anticancer drugs; drugs for the treatment of rheumatoid arthritis, Crohn s disease, or psoriasis; or have you had radiation treatments?   No   Have you had a seizure, or a brain or other nervous system problem?   No   During the past year, have you received a transfusion of blood or blood    products, or been given immune (gamma) globulin or antiviral drug?   No   For women: Are you pregnant or is there a chance you could become       pregnant during the next month?   No   Have you received any vaccinations in the past 4 weeks?   No     Immunization questionnaire answers were all negative.        Per orders of Dr. Bosch, injection of Shingles given by Jalyn Leslie MA. Patient instructed to remain in clinic for 15 minutes afterwards, and to report any adverse reaction to me immediately.       Screening performed by Jalyn Leslie MA on 5/24/2022 at 8:57 AM.

## 2022-07-27 NOTE — LETTER
[facility]  [address]  [place] [address]  [phone]        September 19, 2018    Micki Santos  [address] [place] [address]          Dear Micki,    This is just a reminder that you are coming due for your annual office visit and lab work.     You can call us at [phone] at your convenience and we can help schedule this for you.     Sincerely,        Miranda Bosch MD/pk                   Birth Control Pills Pregnancy And Lactation Text: This medication should be avoided if pregnant and for the first 30 days post-partum.

## 2022-08-02 ENCOUNTER — E-VISIT (OUTPATIENT)
Dept: URGENT CARE | Facility: CLINIC | Age: 62
End: 2022-08-02
Payer: COMMERCIAL

## 2022-08-02 DIAGNOSIS — B96.89 ACUTE BACTERIAL SINUSITIS: Primary | ICD-10-CM

## 2022-08-02 DIAGNOSIS — J01.90 ACUTE BACTERIAL SINUSITIS: Primary | ICD-10-CM

## 2022-08-02 PROCEDURE — 99421 OL DIG E/M SVC 5-10 MIN: CPT | Performed by: EMERGENCY MEDICINE

## 2022-08-02 NOTE — PATIENT INSTRUCTIONS
Dear Micki Santos    After reviewing your responses, I've been able to diagnose you with?a sinus infection caused by bacteria.?     Based on your responses and diagnosis, I have prescribed augmentin to treat your symptoms. I have sent this to your pharmacy.?     It is also important to stay well hydrated, get lots of rest and take over-the-counter decongestants,?tylenol?or ibuprofen if you?are able to?take those medications per your primary care provider to help relieve discomfort.?     It is important that you take?all of?your prescribed medication even if your symptoms are improving after a few doses.? Taking?all of?your medicine helps prevent the symptoms from returning.?     If your symptoms worsen, you develop severe headache, vomiting, high fever (>102), or are not improving in 7 days, please contact your primary care provider for an appointment or visit any of our convenient Walk-in Care or Urgent Care Centers to be seen which can be found on our website?here.?     Thanks again for choosing?us?as your health care partner,?   ?  Bryan Olson MD?

## 2022-10-09 ENCOUNTER — HEALTH MAINTENANCE LETTER (OUTPATIENT)
Age: 62
End: 2022-10-09

## 2023-04-28 DIAGNOSIS — E78.5 HYPERLIPIDEMIA, UNSPECIFIED HYPERLIPIDEMIA TYPE: ICD-10-CM

## 2023-04-28 DIAGNOSIS — I10 ESSENTIAL HYPERTENSION WITH GOAL BLOOD PRESSURE LESS THAN 140/90: ICD-10-CM

## 2023-04-28 DIAGNOSIS — E03.9 HYPOTHYROIDISM, UNSPECIFIED TYPE: ICD-10-CM

## 2023-04-28 DIAGNOSIS — F33.0 MAJOR DEPRESSIVE DISORDER, RECURRENT EPISODE, MILD (H): ICD-10-CM

## 2023-05-01 RX ORDER — LEVOTHYROXINE SODIUM 137 MCG
TABLET ORAL
Qty: 90 TABLET | Refills: 0 | Status: SHIPPED | OUTPATIENT
Start: 2023-05-01 | End: 2023-06-13

## 2023-05-01 RX ORDER — LISINOPRIL 20 MG/1
TABLET ORAL
Qty: 90 TABLET | Refills: 0 | Status: SHIPPED | OUTPATIENT
Start: 2023-05-01 | End: 2023-06-13 | Stop reason: ALTCHOICE

## 2023-05-01 RX ORDER — SERTRALINE HYDROCHLORIDE 100 MG/1
TABLET, FILM COATED ORAL
Qty: 135 TABLET | Refills: 0 | Status: SHIPPED | OUTPATIENT
Start: 2023-05-01 | End: 2023-06-13

## 2023-05-01 RX ORDER — ATORVASTATIN CALCIUM 40 MG/1
TABLET, FILM COATED ORAL
Qty: 90 TABLET | Refills: 0 | Status: SHIPPED | OUTPATIENT
Start: 2023-05-01 | End: 2023-06-13

## 2023-05-01 NOTE — TELEPHONE ENCOUNTER
Pending Prescriptions:                       Disp   Refills    SYNTHROID 137 MCG tablet [Pharmacy Med Na*90 tab*0            Sig: TAKE 1 TABLET DAILY    atorvastatin (LIPITOR) 40 MG tablet [Phar*90 tab*0            Sig: TAKE 1 TABLET DAILY    sertraline (ZOLOFT) 100 MG tablet [Pharma*135 ta*3            Sig: TAKE 1 AND 1/2 TABLETS     DAILY    lisinopril (ZESTRIL) 20 MG tablet [Pharma*90 tab*0            Sig: TAKE 1 TABLET DAILY    Medication is being filled for 1 time shane refill only due to:  Patient is due for routine visit and labs after 5/24/23    Please call and help schedule.  Thank you!

## 2023-05-01 NOTE — TELEPHONE ENCOUNTER
Pending Prescriptions:                       Disp   Refills    sertraline (ZOLOFT) 100 MG tablet [Pharmac*135 ta*3        Sig: TAKE 1 AND 1/2 TABLETS     DAILY    Signed Prescriptions:                        Disp   Refills    SYNTHROID 137 MCG tablet                   90 tab*0        Sig: TAKE 1 TABLET DAILY  Authorizing Provider: ANSELMO ABRAHAM  Ordering User: GILMAR RICKETTS    atorvastatin (LIPITOR) 40 MG tablet        90 tab*0        Sig: TAKE 1 TABLET DAILY  Authorizing Provider: ANSELMO ABRAHAM  Ordering User: GILMAR RICKETTS    lisinopril (ZESTRIL) 20 MG tablet          90 tab*0        Sig: TAKE 1 TABLET DAILY  Authorizing Provider: ANSELMO ABRAHAM  Ordering User: GILMAR RICKETTS    Routing refill request to provider for review/approval because:  Labs not current:  PHQ-9 score:        5/24/2022     7:59 AM   PHQ   PHQ-9 Total Score 4   Q9: Thoughts of better off dead/self-harm past 2 weeks Not at all

## 2023-06-13 ENCOUNTER — ANCILLARY PROCEDURE (OUTPATIENT)
Dept: MAMMOGRAPHY | Facility: OTHER | Age: 63
End: 2023-06-13
Attending: FAMILY MEDICINE
Payer: COMMERCIAL

## 2023-06-13 ENCOUNTER — OFFICE VISIT (OUTPATIENT)
Dept: FAMILY MEDICINE | Facility: OTHER | Age: 63
End: 2023-06-13
Payer: COMMERCIAL

## 2023-06-13 VITALS
OXYGEN SATURATION: 96 % | BODY MASS INDEX: 44.07 KG/M2 | SYSTOLIC BLOOD PRESSURE: 118 MMHG | HEART RATE: 61 BPM | WEIGHT: 255 LBS | DIASTOLIC BLOOD PRESSURE: 84 MMHG | TEMPERATURE: 97.5 F | RESPIRATION RATE: 18 BRPM

## 2023-06-13 DIAGNOSIS — I10 HYPERTENSION, GOAL BELOW 140/90: ICD-10-CM

## 2023-06-13 DIAGNOSIS — M22.40 CHONDROMALACIA OF PATELLOFEMORAL JOINT, UNSPECIFIED LATERALITY: ICD-10-CM

## 2023-06-13 DIAGNOSIS — Z12.4 CERVICAL CANCER SCREENING: ICD-10-CM

## 2023-06-13 DIAGNOSIS — K21.9 GASTROESOPHAGEAL REFLUX DISEASE WITHOUT ESOPHAGITIS: Primary | ICD-10-CM

## 2023-06-13 DIAGNOSIS — E03.9 HYPOTHYROIDISM, UNSPECIFIED TYPE: ICD-10-CM

## 2023-06-13 DIAGNOSIS — E78.5 HYPERLIPIDEMIA, UNSPECIFIED HYPERLIPIDEMIA TYPE: ICD-10-CM

## 2023-06-13 DIAGNOSIS — F33.0 MAJOR DEPRESSIVE DISORDER, RECURRENT EPISODE, MILD (H): ICD-10-CM

## 2023-06-13 DIAGNOSIS — Z23 HIGH PRIORITY FOR 2019-NCOV VACCINE: ICD-10-CM

## 2023-06-13 DIAGNOSIS — Z12.31 VISIT FOR SCREENING MAMMOGRAM: ICD-10-CM

## 2023-06-13 LAB
ALBUMIN SERPL BCG-MCNC: 4.5 G/DL (ref 3.5–5.2)
ALP SERPL-CCNC: 139 U/L (ref 35–104)
ALT SERPL W P-5'-P-CCNC: 30 U/L (ref 0–50)
ANION GAP SERPL CALCULATED.3IONS-SCNC: 10 MMOL/L (ref 7–15)
AST SERPL W P-5'-P-CCNC: 23 U/L (ref 0–45)
BILIRUB SERPL-MCNC: 0.3 MG/DL
BUN SERPL-MCNC: 18.7 MG/DL (ref 8–23)
CALCIUM SERPL-MCNC: 9.8 MG/DL (ref 8.8–10.2)
CHLORIDE SERPL-SCNC: 105 MMOL/L (ref 98–107)
CHOLEST SERPL-MCNC: 199 MG/DL
CREAT SERPL-MCNC: 1.06 MG/DL (ref 0.51–0.95)
DEPRECATED HCO3 PLAS-SCNC: 25 MMOL/L (ref 22–29)
GFR SERPL CREATININE-BSD FRML MDRD: 59 ML/MIN/1.73M2
GLUCOSE SERPL-MCNC: 98 MG/DL (ref 70–99)
HBA1C MFR BLD: 5.1 % (ref 0–5.6)
HDLC SERPL-MCNC: 53 MG/DL
LDLC SERPL CALC-MCNC: 110 MG/DL
NONHDLC SERPL-MCNC: 146 MG/DL
POTASSIUM SERPL-SCNC: 5 MMOL/L (ref 3.4–5.3)
PROT SERPL-MCNC: 7.3 G/DL (ref 6.4–8.3)
SODIUM SERPL-SCNC: 140 MMOL/L (ref 136–145)
T4 FREE SERPL-MCNC: 0.89 NG/DL (ref 0.9–1.7)
TRIGL SERPL-MCNC: 178 MG/DL
TSH SERPL DL<=0.005 MIU/L-ACNC: 6.77 UIU/ML (ref 0.3–4.2)

## 2023-06-13 PROCEDURE — G0145 SCR C/V CYTO,THINLAYER,RESCR: HCPCS | Performed by: FAMILY MEDICINE

## 2023-06-13 PROCEDURE — 80061 LIPID PANEL: CPT | Performed by: FAMILY MEDICINE

## 2023-06-13 PROCEDURE — 77067 SCR MAMMO BI INCL CAD: CPT | Mod: TC | Performed by: STUDENT IN AN ORGANIZED HEALTH CARE EDUCATION/TRAINING PROGRAM

## 2023-06-13 PROCEDURE — 83036 HEMOGLOBIN GLYCOSYLATED A1C: CPT | Performed by: FAMILY MEDICINE

## 2023-06-13 PROCEDURE — 96127 BRIEF EMOTIONAL/BEHAV ASSMT: CPT | Performed by: FAMILY MEDICINE

## 2023-06-13 PROCEDURE — 91312 COVID-19 BIVALENT 12+ (PFIZER): CPT | Performed by: FAMILY MEDICINE

## 2023-06-13 PROCEDURE — 99214 OFFICE O/P EST MOD 30 MIN: CPT | Mod: 25 | Performed by: FAMILY MEDICINE

## 2023-06-13 PROCEDURE — 87624 HPV HI-RISK TYP POOLED RSLT: CPT | Performed by: FAMILY MEDICINE

## 2023-06-13 PROCEDURE — 80053 COMPREHEN METABOLIC PANEL: CPT | Performed by: FAMILY MEDICINE

## 2023-06-13 PROCEDURE — 0121A COVID-19 BIVALENT 12+ (PFIZER): CPT | Performed by: FAMILY MEDICINE

## 2023-06-13 PROCEDURE — 84439 ASSAY OF FREE THYROXINE: CPT | Performed by: FAMILY MEDICINE

## 2023-06-13 PROCEDURE — 84443 ASSAY THYROID STIM HORMONE: CPT | Performed by: FAMILY MEDICINE

## 2023-06-13 PROCEDURE — 36415 COLL VENOUS BLD VENIPUNCTURE: CPT | Performed by: FAMILY MEDICINE

## 2023-06-13 PROCEDURE — 77063 BREAST TOMOSYNTHESIS BI: CPT | Mod: TC | Performed by: STUDENT IN AN ORGANIZED HEALTH CARE EDUCATION/TRAINING PROGRAM

## 2023-06-13 RX ORDER — DICLOFENAC SODIUM 75 MG/1
TABLET, DELAYED RELEASE ORAL
Qty: 180 TABLET | Refills: 3 | Status: SHIPPED | OUTPATIENT
Start: 2023-06-13

## 2023-06-13 RX ORDER — LEVOTHYROXINE SODIUM 137 UG/1
137 TABLET ORAL DAILY
Qty: 90 TABLET | Refills: 3 | Status: SHIPPED | OUTPATIENT
Start: 2023-06-13 | End: 2023-06-13 | Stop reason: DRUGHIGH

## 2023-06-13 RX ORDER — LEVOTHYROXINE SODIUM 150 UG/1
150 TABLET ORAL DAILY
Qty: 90 TABLET | Refills: 3 | Status: SHIPPED | OUTPATIENT
Start: 2023-06-13

## 2023-06-13 RX ORDER — METOPROLOL SUCCINATE 50 MG/1
50 TABLET, EXTENDED RELEASE ORAL DAILY
Qty: 90 TABLET | Refills: 3 | Status: SHIPPED | OUTPATIENT
Start: 2023-06-13

## 2023-06-13 RX ORDER — ATORVASTATIN CALCIUM 40 MG/1
40 TABLET, FILM COATED ORAL DAILY
Qty: 90 TABLET | Refills: 3 | Status: SHIPPED | OUTPATIENT
Start: 2023-06-13

## 2023-06-13 RX ORDER — PANTOPRAZOLE SODIUM 20 MG/1
20 TABLET, DELAYED RELEASE ORAL DAILY PRN
Qty: 90 TABLET | Refills: 3 | Status: SHIPPED | OUTPATIENT
Start: 2023-06-13

## 2023-06-13 RX ORDER — SERTRALINE HYDROCHLORIDE 100 MG/1
TABLET, FILM COATED ORAL
Qty: 135 TABLET | Refills: 3 | Status: SHIPPED | OUTPATIENT
Start: 2023-06-13

## 2023-06-13 ASSESSMENT — PATIENT HEALTH QUESTIONNAIRE - PHQ9
SUM OF ALL RESPONSES TO PHQ QUESTIONS 1-9: 1
10. IF YOU CHECKED OFF ANY PROBLEMS, HOW DIFFICULT HAVE THESE PROBLEMS MADE IT FOR YOU TO DO YOUR WORK, TAKE CARE OF THINGS AT HOME, OR GET ALONG WITH OTHER PEOPLE: NOT DIFFICULT AT ALL
SUM OF ALL RESPONSES TO PHQ QUESTIONS 1-9: 1

## 2023-06-13 ASSESSMENT — PAIN SCALES - GENERAL: PAINLEVEL: NO PAIN (0)

## 2023-06-13 NOTE — PROGRESS NOTES
Assessment & Plan     Gastroesophageal reflux disease without esophagitis  Well-controlled with Protonix.  She does have symptoms if she misses a day of taking it.  Refills were given.    - pantoprazole (PROTONIX) 20 MG EC tablet; Take 1 tablet (20 mg) by mouth daily as needed for heartburn    Major depressive disorder, recurrent episode, mild (H)  She feels her mood is stable.  She gets a little emotional when she is back in [place].  Otherwise she and her  travel for the rest of the year.  She would like to stay on her current dose of sertraline.  Refills were given.    - sertraline (ZOLOFT) 100 MG tablet; TAKE 1 AND 1/2 TABLETS     DAILY    Hypothyroidism, unspecified type  Labs drawn.  Refills given.    - TSH WITH FREE T4 REFLEX; Future  - levothyroxine (SYNTHROID) 137 MCG tablet; Take 1 tablet (137 mcg) by mouth daily    Hyperlipidemia, unspecified hyperlipidemia type  She continues on statin.  Labs drawn.  Refills given.    - Lipid panel reflex to direct LDL Non-fasting; Future  - atorvastatin (LIPITOR) 40 MG tablet; Take 1 tablet (40 mg) by mouth daily  - Hemoglobin A1c; Future    Hypertension, goal below 140/90  Blood pressure is well controlled but she is concerned that her heart rate gets into the 130s when she is exercising.  She would like to go back to the beta-blocker and get off the lisinopril.  Therefore we will stop lisinopril and start metoprolol.  She will send me her blood pressure readings and heart rate readings through [facility] as she is traveling throughout the year.    - COMPREHENSIVE METABOLIC PANEL; Future  - metoprolol succinate ER (TOPROL XL) 50 MG 24 hr tablet; Take 1 tablet (50 mg) by mouth daily  - Hemoglobin A1c; Future    Chondromalacia of patellofemoral joint, unspecified laterality  Uses diclofenac sparingly.  Refills were given.    - diclofenac (VOLTAREN) 75 MG EC tablet; TAKE 1 TABLET 2 TIMES DAILYAS NEEDED FOR MODERATE PAIN    Cervical cancer screening  She is due for  Pap today this was done.   - Pap Screen with HPV - recommended age 30 - 65 years    High priority for 2019-nCoV vaccine  She agrees to booster immunization today    Miranda Bosch MD  [facility] JACQUELINE Sweet is a 62 year old, presenting for the following health issues:  Recheck Medication    **Fasting**  -DM testing today   -cholesterol         6/13/2023     8:26 AM   Additional Questions   Roomed by Jalyn   Accompanied by self     History of Present Illness       Hypertension: She presents for follow up of hypertension.  She does check blood pressure  regularly outside of the clinic. Outpatient blood pressures have not been over 140/90. She does not follow a low salt diet.     Reason for visit:  RX update for upcoming year.  Review BP prescription    She eats 2-3 servings of fruits and vegetables daily.She consumes 0 sweetened beverage(s) daily.She exercises with enough effort to increase her heart rate 30 to 60 minutes per day.  She exercises with enough effort to increase her heart rate 4 days per week.   She is taking medications regularly.    Today's PHQ-9         PHQ-9 Total Score: 1    PHQ-9 Q9 Thoughts of better off dead/self-harm past 2 weeks :   Not at all    How difficult have these problems made it for you to do your work, take care of things at home, or get along with other people: Not difficult at all       Hyperlipidemia Follow-Up      Are you regularly taking any medication or supplement to lower your cholesterol?   Yes- lipitor    Are you having muscle aches or other side effects that you think could be caused by your cholesterol lowering medication?  No    Depression Followup    How are you doing with your depression since your last visit? No change    Are you having other symptoms that might be associated with depression? No    Have you had a significant life event? TC- aware     Are you feeling anxious or having panic attacks?   No    Do you have any  concerns with your use of alcohol or other drugs? No    Social History     Tobacco Use     Smoking status: Never     Smokeless tobacco: Never     Tobacco comments:     No smokers in home   Vaping Use     Vaping status: Never Used   Substance Use Topics     Alcohol use: Yes     Comment: 1-2 glasses of wine/week     Drug use: No         6/8/2021     9:07 AM 5/24/2022     7:59 AM 6/13/2023     8:00 AM   PHQ   PHQ-9 Total Score 1 4 1   Q9: Thoughts of better off dead/self-harm past 2 weeks Not at all Not at all Not at all         5/7/2020     3:09 PM 4/14/2021     9:41 AM 6/8/2021     9:07 AM   FATMATA-7 SCORE   Total Score  2 (minimal anxiety)    Total Score 0 2 0         Suicide Assessment Five-step Evaluation and Treatment (SAFE-T)      Hypothyroidism Follow-up      Since last visit, patient describes the following symptoms: Weight stable, no hair loss, no skin changes, no constipation, no loose stools        Objective    /84   Pulse 61   Temp 97.5  F (36.4  C) (Temporal)   Resp 18   Wt 115.7 kg (255 lb)   LMP 07/29/2003   SpO2 96%   BMI 44.07 kg/m    Body mass index is 44.07 kg/m .  Physical Exam   Gen: no apparent distress  NECK: no adenopathy, no asymmetry, no masses  Chest: clear to auscultation without wheeze, rale or rhonchi  Cor: regular rate and rhythm without murmur  ABDOMEN: soft, nontender, no masses and bowel sounds normal  Ext: warm and dry without edema  : Vagina and vulva are normal;  no discharge is noted.  Cervix normal without lesions. Uterus anteverted and mobile, normal in size and shape without tenderness.   Pap Smear - is completed today.   Psych: Alert and oriented times 3; coherent speech, normal   rate and volume, able to articulate logical thoughts, able   to abstract reason, no tangential thoughts, no hallucinations   or delusions  Her affect is neutral

## 2023-06-15 LAB
BKR LAB AP GYN ADEQUACY: NORMAL
BKR LAB AP GYN INTERPRETATION: NORMAL
BKR LAB AP HPV REFLEX: NORMAL
BKR LAB AP PREVIOUS ABNORMAL: NORMAL
PATH REPORT.COMMENTS IMP SPEC: NORMAL
PATH REPORT.COMMENTS IMP SPEC: NORMAL
PATH REPORT.RELEVANT HX SPEC: NORMAL

## 2023-06-16 LAB
HUMAN PAPILLOMA VIRUS 16 DNA: NEGATIVE
HUMAN PAPILLOMA VIRUS 18 DNA: NEGATIVE
HUMAN PAPILLOMA VIRUS FINAL DIAGNOSIS: NORMAL
HUMAN PAPILLOMA VIRUS OTHER HR: NEGATIVE

## 2023-06-20 PROBLEM — Z12.4 CERVICAL CANCER SCREENING: Status: ACTIVE | Noted: 2018-08-16

## 2023-08-19 ENCOUNTER — HEALTH MAINTENANCE LETTER (OUTPATIENT)
Age: 63
End: 2023-08-19

## 2024-03-19 DIAGNOSIS — I10 HYPERTENSION, GOAL BELOW 140/90: ICD-10-CM

## 2024-03-19 DIAGNOSIS — E03.9 HYPOTHYROIDISM, UNSPECIFIED TYPE: ICD-10-CM

## 2024-03-20 RX ORDER — LEVOTHYROXINE SODIUM 150 MCG
150 TABLET ORAL DAILY
Qty: 90 TABLET | Refills: 3 | OUTPATIENT
Start: 2024-03-20

## 2024-03-20 RX ORDER — METOPROLOL SUCCINATE 50 MG/1
50 TABLET, EXTENDED RELEASE ORAL DAILY
Qty: 90 TABLET | Refills: 3 | OUTPATIENT
Start: 2024-03-20

## 2024-10-12 ENCOUNTER — HEALTH MAINTENANCE LETTER (OUTPATIENT)
Age: 64
End: 2024-10-12

## 2025-01-25 ENCOUNTER — HEALTH MAINTENANCE LETTER (OUTPATIENT)
Age: 65
End: 2025-01-25

## 2025-08-16 ENCOUNTER — HEALTH MAINTENANCE LETTER (OUTPATIENT)
Age: 65
End: 2025-08-16